# Patient Record
Sex: MALE | Race: BLACK OR AFRICAN AMERICAN | NOT HISPANIC OR LATINO | Employment: FULL TIME | URBAN - METROPOLITAN AREA
[De-identification: names, ages, dates, MRNs, and addresses within clinical notes are randomized per-mention and may not be internally consistent; named-entity substitution may affect disease eponyms.]

---

## 2020-07-10 ENCOUNTER — HOSPITAL ENCOUNTER (OUTPATIENT)
Dept: GASTROENTEROLOGY | Facility: HOSPITAL | Age: 65
Discharge: HOME | End: 2020-07-10
Attending: NURSE PRACTITIONER

## 2020-07-10 ENCOUNTER — OFFICE VISIT CONVERTED (OUTPATIENT)
Dept: GASTROENTEROLOGY | Facility: HOSPITAL | Age: 65
End: 2020-07-10
Attending: NURSE PRACTITIONER

## 2020-07-10 LAB
ALBUMIN SERPL-MCNC: 4.4 G/DL (ref 3.5–5)
ALBUMIN/GLOB SERPL: 1.6 {RATIO} (ref 1.4–2.6)
ALP SERPL-CCNC: 79 U/L (ref 56–155)
ALT SERPL-CCNC: 58 U/L (ref 10–40)
ANION GAP SERPL CALC-SCNC: 16 MMOL/L (ref 8–19)
AST SERPL-CCNC: 54 U/L (ref 15–50)
BASOPHILS # BLD AUTO: 0.05 10*3/UL (ref 0–0.2)
BASOPHILS NFR BLD AUTO: 1 % (ref 0–3)
BILIRUB SERPL-MCNC: 0.37 MG/DL (ref 0.2–1.3)
BUN SERPL-MCNC: 13 MG/DL (ref 5–25)
BUN/CREAT SERPL: 11 {RATIO} (ref 6–20)
CALCIUM SERPL-MCNC: 9.7 MG/DL (ref 8.7–10.4)
CHLORIDE SERPL-SCNC: 102 MMOL/L (ref 99–111)
CONV ABS IMM GRAN: 0.01 10*3/UL (ref 0–0.2)
CONV CO2: 25 MMOL/L (ref 22–32)
CONV HIV-1/ HIV-2: NONREACTIVE
CONV IMMATURE GRAN: 0.2 % (ref 0–1.8)
CONV TOTAL PROTEIN: 7.2 G/DL (ref 6.3–8.2)
CREAT UR-MCNC: 1.23 MG/DL (ref 0.7–1.2)
DEPRECATED RDW RBC AUTO: 44.8 FL (ref 35.1–43.9)
EOSINOPHIL # BLD AUTO: 0.12 10*3/UL (ref 0–0.7)
EOSINOPHIL # BLD AUTO: 2.4 % (ref 0–7)
ERYTHROCYTE [DISTWIDTH] IN BLOOD BY AUTOMATED COUNT: 14.2 % (ref 11.6–14.4)
ETHANOL BLD-MCNC: <10 MG/DL
GFR SERPLBLD BASED ON 1.73 SQ M-ARVRAT: >60 ML/MIN/{1.73_M2}
GLOBULIN UR ELPH-MCNC: 2.8 G/DL (ref 2–3.5)
GLUCOSE SERPL-MCNC: 87 MG/DL (ref 70–99)
HCT VFR BLD AUTO: 44.2 % (ref 42–52)
HGB BLD-MCNC: 14.1 G/DL (ref 14–18)
INR PPP: 0.89 (ref 2–3)
LYMPHOCYTES # BLD AUTO: 1.54 10*3/UL (ref 1–5)
LYMPHOCYTES NFR BLD AUTO: 31 % (ref 20–45)
MCH RBC QN AUTO: 27.6 PG (ref 27–31)
MCHC RBC AUTO-ENTMCNC: 31.9 G/DL (ref 33–37)
MCV RBC AUTO: 86.5 FL (ref 80–96)
MONOCYTES # BLD AUTO: 0.58 10*3/UL (ref 0.2–1.2)
MONOCYTES NFR BLD AUTO: 11.7 % (ref 3–10)
NEUTROPHILS # BLD AUTO: 2.67 10*3/UL (ref 2–8)
NEUTROPHILS NFR BLD AUTO: 53.7 % (ref 30–85)
NRBC CBCN: 0 % (ref 0–0.7)
OSMOLALITY SERPL CALC.SUM OF ELEC: 285 MOSM/KG (ref 273–304)
PLATELET # BLD AUTO: 223 10*3/UL (ref 130–400)
PMV BLD AUTO: 11.8 FL (ref 9.4–12.4)
POTASSIUM SERPL-SCNC: 4.8 MMOL/L (ref 3.5–5.3)
PROTHROMBIN TIME: 9.8 S (ref 9.4–12)
RBC # BLD AUTO: 5.11 10*6/UL (ref 4.7–6.1)
SODIUM SERPL-SCNC: 138 MMOL/L (ref 135–147)
WBC # BLD AUTO: 4.97 10*3/UL (ref 4.8–10.8)

## 2020-07-11 LAB
CONV HEPATITIS B SURFACE AG W CONFIRMATION RE: NEGATIVE
HAV IGM SERPL QL IA: NEGATIVE
HBV CORE AB SER DONR QL IA: POSITIVE
HBV CORE IGM SERPL QL IA: NEGATIVE
HCV AB SER DONR QL: >11 S/CO RATIO (ref 0–0.9)

## 2020-07-14 LAB — CONV FIBROSURE HCV: NORMAL

## 2020-07-15 LAB
CONV HEPATITIS C TEST INFO: NORMAL
HCV RNA SERPL NAA+PROBE-ACNC: NORMAL IU/ML
HCV RNA SERPL NAA+PROBE-LOG IU: 6.67 LOG10 IU/ML

## 2020-07-16 ENCOUNTER — HOSPITAL ENCOUNTER (OUTPATIENT)
Dept: ULTRASOUND IMAGING | Facility: HOSPITAL | Age: 65
Discharge: HOME | End: 2020-07-16
Attending: NURSE PRACTITIONER

## 2020-07-16 LAB
CONV HEPATITIS C GENOTYPE NOTE: NORMAL
HCV GENTYP SERPL NAA+PROBE: NORMAL

## 2020-07-21 ENCOUNTER — HOSPITAL ENCOUNTER (OUTPATIENT)
Dept: MRI IMAGING | Facility: HOSPITAL | Age: 65
Discharge: HOME | End: 2020-07-21
Attending: PHYSICIAN ASSISTANT

## 2020-08-07 ENCOUNTER — HOSPITAL ENCOUNTER (OUTPATIENT)
Dept: OTHER | Facility: HOSPITAL | Age: 65
Discharge: HOME | End: 2020-08-07
Attending: NURSE PRACTITIONER

## 2020-08-09 LAB
CONV HBV TEST INFORMATION: NORMAL
CONV HEPATITIS B VIRUS DNA (LOG UNITS PER VOLUME) (VIRAL LOAD): NORMAL LOG10 IU/ML
HEP B VIRAL DNA IU/ML: NORMAL IU/ML

## 2020-08-14 ENCOUNTER — HOSPITAL ENCOUNTER (OUTPATIENT)
Dept: GENERAL RADIOLOGY | Facility: HOSPITAL | Age: 65
Discharge: HOME | End: 2020-08-14
Attending: NURSE PRACTITIONER

## 2020-09-01 ENCOUNTER — CONVERSION ENCOUNTER (OUTPATIENT)
Dept: NEUROLOGY | Facility: CLINIC | Age: 65
End: 2020-09-01

## 2020-09-01 ENCOUNTER — OFFICE VISIT CONVERTED (OUTPATIENT)
Dept: NEUROLOGY | Facility: CLINIC | Age: 65
End: 2020-09-01
Attending: NURSE PRACTITIONER

## 2020-09-09 ENCOUNTER — HOSPITAL ENCOUNTER (OUTPATIENT)
Dept: OTHER | Facility: HOSPITAL | Age: 65
Discharge: HOME | End: 2020-09-09
Attending: NURSE PRACTITIONER

## 2020-09-09 LAB
CRP SERPL-MCNC: 1.3 MG/L (ref 0–5)
ERYTHROCYTE [SEDIMENTATION RATE] IN BLOOD: 2 MM/H (ref 0–20)

## 2020-09-15 ENCOUNTER — HOSPITAL ENCOUNTER (OUTPATIENT)
Dept: OTHER | Facility: HOSPITAL | Age: 65
Discharge: HOME | End: 2020-09-15
Attending: NURSE PRACTITIONER

## 2020-09-15 LAB
ALBUMIN SERPL-MCNC: 4.3 G/DL (ref 3.5–5)
ALBUMIN/GLOB SERPL: 1.6 {RATIO} (ref 1.4–2.6)
ALP SERPL-CCNC: 66 U/L (ref 56–155)
ALT SERPL-CCNC: 20 U/L (ref 10–40)
ANION GAP SERPL CALC-SCNC: 13 MMOL/L (ref 8–19)
AST SERPL-CCNC: 21 U/L (ref 15–50)
BASOPHILS # BLD AUTO: 0.03 10*3/UL (ref 0–0.2)
BASOPHILS NFR BLD AUTO: 0.3 % (ref 0–3)
BILIRUB SERPL-MCNC: 0.28 MG/DL (ref 0.2–1.3)
BUN SERPL-MCNC: 22 MG/DL (ref 5–25)
BUN/CREAT SERPL: 18 {RATIO} (ref 6–20)
CALCIUM SERPL-MCNC: 9.2 MG/DL (ref 8.7–10.4)
CHLORIDE SERPL-SCNC: 101 MMOL/L (ref 99–111)
CONV ABS IMM GRAN: 0.06 10*3/UL (ref 0–0.2)
CONV CO2: 28 MMOL/L (ref 22–32)
CONV IMMATURE GRAN: 0.6 % (ref 0–1.8)
CONV TOTAL PROTEIN: 7 G/DL (ref 6.3–8.2)
CREAT UR-MCNC: 1.2 MG/DL (ref 0.7–1.2)
DEPRECATED RDW RBC AUTO: 41.1 FL (ref 35.1–43.9)
EOSINOPHIL # BLD AUTO: 0.01 10*3/UL (ref 0–0.7)
EOSINOPHIL # BLD AUTO: 0.1 % (ref 0–7)
ERYTHROCYTE [DISTWIDTH] IN BLOOD BY AUTOMATED COUNT: 13.3 % (ref 11.6–14.4)
GFR SERPLBLD BASED ON 1.73 SQ M-ARVRAT: >60 ML/MIN/{1.73_M2}
GLOBULIN UR ELPH-MCNC: 2.7 G/DL (ref 2–3.5)
GLUCOSE SERPL-MCNC: 114 MG/DL (ref 70–99)
HCT VFR BLD AUTO: 45.8 % (ref 42–52)
HGB BLD-MCNC: 14.8 G/DL (ref 14–18)
INR PPP: 0.99 (ref 2–3)
LYMPHOCYTES # BLD AUTO: 2.27 10*3/UL (ref 1–5)
LYMPHOCYTES NFR BLD AUTO: 21.4 % (ref 20–45)
MCH RBC QN AUTO: 27.6 PG (ref 27–31)
MCHC RBC AUTO-ENTMCNC: 32.3 G/DL (ref 33–37)
MCV RBC AUTO: 85.4 FL (ref 80–96)
MONOCYTES # BLD AUTO: 1.24 10*3/UL (ref 0.2–1.2)
MONOCYTES NFR BLD AUTO: 11.7 % (ref 3–10)
NEUTROPHILS # BLD AUTO: 7 10*3/UL (ref 2–8)
NEUTROPHILS NFR BLD AUTO: 65.9 % (ref 30–85)
NRBC CBCN: 0 % (ref 0–0.7)
OSMOLALITY SERPL CALC.SUM OF ELEC: 288 MOSM/KG (ref 273–304)
PLATELET # BLD AUTO: 230 10*3/UL (ref 130–400)
PMV BLD AUTO: 10.3 FL (ref 9.4–12.4)
POTASSIUM SERPL-SCNC: 4.6 MMOL/L (ref 3.5–5.3)
PROTHROMBIN TIME: 10.7 S (ref 9.4–12)
RBC # BLD AUTO: 5.36 10*6/UL (ref 4.7–6.1)
SODIUM SERPL-SCNC: 137 MMOL/L (ref 135–147)
WBC # BLD AUTO: 10.61 10*3/UL (ref 4.8–10.8)

## 2020-09-17 LAB
CONV HEPATITIS C TEST INFO: NORMAL
HCV RNA SERPL NAA+PROBE-ACNC: 50 IU/ML
HCV RNA SERPL NAA+PROBE-LOG IU: 1.7 LOG10 IU/ML

## 2020-09-25 ENCOUNTER — HOSPITAL ENCOUNTER (OUTPATIENT)
Dept: GASTROENTEROLOGY | Facility: HOSPITAL | Age: 65
Discharge: HOME | End: 2020-09-25
Attending: NURSE PRACTITIONER

## 2020-09-25 ENCOUNTER — OFFICE VISIT CONVERTED (OUTPATIENT)
Dept: GASTROENTEROLOGY | Facility: HOSPITAL | Age: 65
End: 2020-09-25
Attending: NURSE PRACTITIONER

## 2020-09-25 LAB
ALBUMIN SERPL-MCNC: 3.9 G/DL (ref 3.5–5)
ALBUMIN/GLOB SERPL: 1.5 {RATIO} (ref 1.4–2.6)
ALP SERPL-CCNC: 57 U/L (ref 56–155)
ALT SERPL-CCNC: 13 U/L (ref 10–40)
ANION GAP SERPL CALC-SCNC: 12 MMOL/L (ref 8–19)
AST SERPL-CCNC: 19 U/L (ref 15–50)
BASOPHILS # BLD AUTO: 0.04 10*3/UL (ref 0–0.2)
BASOPHILS NFR BLD AUTO: 0.7 % (ref 0–3)
BILIRUB SERPL-MCNC: <0.15 MG/DL (ref 0.2–1.3)
BUN SERPL-MCNC: 14 MG/DL (ref 5–25)
BUN/CREAT SERPL: 12 {RATIO} (ref 6–20)
CALCIUM SERPL-MCNC: 8.9 MG/DL (ref 8.7–10.4)
CHLORIDE SERPL-SCNC: 107 MMOL/L (ref 99–111)
CONV ABS IMM GRAN: 0.02 10*3/UL (ref 0–0.2)
CONV CO2: 26 MMOL/L (ref 22–32)
CONV IMMATURE GRAN: 0.4 % (ref 0–1.8)
CONV TOTAL PROTEIN: 6.5 G/DL (ref 6.3–8.2)
CREAT UR-MCNC: 1.18 MG/DL (ref 0.7–1.2)
DEPRECATED RDW RBC AUTO: 44.9 FL (ref 35.1–43.9)
EOSINOPHIL # BLD AUTO: 0.15 10*3/UL (ref 0–0.7)
EOSINOPHIL # BLD AUTO: 2.7 % (ref 0–7)
ERYTHROCYTE [DISTWIDTH] IN BLOOD BY AUTOMATED COUNT: 13.9 % (ref 11.6–14.4)
GFR SERPLBLD BASED ON 1.73 SQ M-ARVRAT: >60 ML/MIN/{1.73_M2}
GLOBULIN UR ELPH-MCNC: 2.6 G/DL (ref 2–3.5)
GLUCOSE SERPL-MCNC: 118 MG/DL (ref 70–99)
HCT VFR BLD AUTO: 42.6 % (ref 42–52)
HGB BLD-MCNC: 13.5 G/DL (ref 14–18)
INR PPP: 0.9 (ref 2–3)
LYMPHOCYTES # BLD AUTO: 1.93 10*3/UL (ref 1–5)
LYMPHOCYTES NFR BLD AUTO: 34.6 % (ref 20–45)
MCH RBC QN AUTO: 27.6 PG (ref 27–31)
MCHC RBC AUTO-ENTMCNC: 31.7 G/DL (ref 33–37)
MCV RBC AUTO: 87.1 FL (ref 80–96)
MONOCYTES # BLD AUTO: 0.69 10*3/UL (ref 0.2–1.2)
MONOCYTES NFR BLD AUTO: 12.4 % (ref 3–10)
NEUTROPHILS # BLD AUTO: 2.75 10*3/UL (ref 2–8)
NEUTROPHILS NFR BLD AUTO: 49.2 % (ref 30–85)
NRBC CBCN: 0 % (ref 0–0.7)
OSMOLALITY SERPL CALC.SUM OF ELEC: 292 MOSM/KG (ref 273–304)
PLATELET # BLD AUTO: 174 10*3/UL (ref 130–400)
PMV BLD AUTO: 10.9 FL (ref 9.4–12.4)
POTASSIUM SERPL-SCNC: 4.5 MMOL/L (ref 3.5–5.3)
PROTHROMBIN TIME: 9.9 S (ref 9.4–12)
RBC # BLD AUTO: 4.89 10*6/UL (ref 4.7–6.1)
SODIUM SERPL-SCNC: 140 MMOL/L (ref 135–147)
WBC # BLD AUTO: 5.58 10*3/UL (ref 4.8–10.8)

## 2020-09-27 LAB
CONV HEPATITIS C TEST INFO: NORMAL
HCV RNA SERPL NAA+PROBE-ACNC: <15 IU/ML

## 2020-10-23 ENCOUNTER — OFFICE VISIT CONVERTED (OUTPATIENT)
Dept: GASTROENTEROLOGY | Facility: HOSPITAL | Age: 65
End: 2020-10-23
Attending: NURSE PRACTITIONER

## 2020-10-23 ENCOUNTER — HOSPITAL ENCOUNTER (OUTPATIENT)
Dept: GASTROENTEROLOGY | Facility: HOSPITAL | Age: 65
Discharge: HOME | End: 2020-10-23
Attending: NURSE PRACTITIONER

## 2020-10-23 LAB
ALBUMIN SERPL-MCNC: 3.9 G/DL (ref 3.5–5)
ALBUMIN/GLOB SERPL: 1.3 {RATIO} (ref 1.4–2.6)
ALP SERPL-CCNC: 84 U/L (ref 56–155)
ALT SERPL-CCNC: 13 U/L (ref 10–40)
ANION GAP SERPL CALC-SCNC: 15 MMOL/L (ref 8–19)
AST SERPL-CCNC: 21 U/L (ref 15–50)
BASOPHILS # BLD AUTO: 0.04 10*3/UL (ref 0–0.2)
BASOPHILS NFR BLD AUTO: 0.7 % (ref 0–3)
BILIRUB SERPL-MCNC: 0.22 MG/DL (ref 0.2–1.3)
BUN SERPL-MCNC: 13 MG/DL (ref 5–25)
BUN/CREAT SERPL: 12 {RATIO} (ref 6–20)
CALCIUM SERPL-MCNC: 8.9 MG/DL (ref 8.7–10.4)
CHLORIDE SERPL-SCNC: 105 MMOL/L (ref 99–111)
CONV ABS IMM GRAN: 0.02 10*3/UL (ref 0–0.2)
CONV CO2: 23 MMOL/L (ref 22–32)
CONV IMMATURE GRAN: 0.3 % (ref 0–1.8)
CONV TOTAL PROTEIN: 6.8 G/DL (ref 6.3–8.2)
CREAT UR-MCNC: 1.06 MG/DL (ref 0.7–1.2)
DEPRECATED RDW RBC AUTO: 41.1 FL (ref 35.1–43.9)
EOSINOPHIL # BLD AUTO: 0.16 10*3/UL (ref 0–0.7)
EOSINOPHIL # BLD AUTO: 2.6 % (ref 0–7)
ERYTHROCYTE [DISTWIDTH] IN BLOOD BY AUTOMATED COUNT: 13.2 % (ref 11.6–14.4)
GFR SERPLBLD BASED ON 1.73 SQ M-ARVRAT: >60 ML/MIN/{1.73_M2}
GLOBULIN UR ELPH-MCNC: 2.9 G/DL (ref 2–3.5)
GLUCOSE SERPL-MCNC: 104 MG/DL (ref 70–99)
HCT VFR BLD AUTO: 42.2 % (ref 42–52)
HGB BLD-MCNC: 13.7 G/DL (ref 14–18)
INR PPP: 0.88 (ref 2–3)
LYMPHOCYTES # BLD AUTO: 2.08 10*3/UL (ref 1–5)
LYMPHOCYTES NFR BLD AUTO: 33.9 % (ref 20–45)
MCH RBC QN AUTO: 27.5 PG (ref 27–31)
MCHC RBC AUTO-ENTMCNC: 32.5 G/DL (ref 33–37)
MCV RBC AUTO: 84.7 FL (ref 80–96)
MONOCYTES # BLD AUTO: 0.73 10*3/UL (ref 0.2–1.2)
MONOCYTES NFR BLD AUTO: 11.9 % (ref 3–10)
NEUTROPHILS # BLD AUTO: 3.1 10*3/UL (ref 2–8)
NEUTROPHILS NFR BLD AUTO: 50.6 % (ref 30–85)
NRBC CBCN: 0 % (ref 0–0.7)
OSMOLALITY SERPL CALC.SUM OF ELEC: 286 MOSM/KG (ref 273–304)
PLATELET # BLD AUTO: 246 10*3/UL (ref 130–400)
PMV BLD AUTO: 10.7 FL (ref 9.4–12.4)
POTASSIUM SERPL-SCNC: 4.6 MMOL/L (ref 3.5–5.3)
PROTHROMBIN TIME: 9.7 S (ref 9.4–12)
RBC # BLD AUTO: 4.98 10*6/UL (ref 4.7–6.1)
SODIUM SERPL-SCNC: 138 MMOL/L (ref 135–147)
WBC # BLD AUTO: 6.13 10*3/UL (ref 4.8–10.8)

## 2020-10-24 LAB
CONV HEPATITIS C TEST INFO: NORMAL
HCV RNA SERPL NAA+PROBE-ACNC: NORMAL IU/ML

## 2020-10-29 ENCOUNTER — CONVERSION ENCOUNTER (OUTPATIENT)
Dept: OTOLARYNGOLOGY | Facility: CLINIC | Age: 65
End: 2020-10-29

## 2020-10-29 ENCOUNTER — OFFICE VISIT CONVERTED (OUTPATIENT)
Dept: OTOLARYNGOLOGY | Facility: CLINIC | Age: 65
End: 2020-10-29
Attending: OTOLARYNGOLOGY

## 2020-11-11 ENCOUNTER — OFFICE VISIT CONVERTED (OUTPATIENT)
Dept: NEUROLOGY | Facility: CLINIC | Age: 65
End: 2020-11-11
Attending: NURSE PRACTITIONER

## 2020-11-20 ENCOUNTER — HOSPITAL ENCOUNTER (OUTPATIENT)
Dept: GASTROENTEROLOGY | Facility: HOSPITAL | Age: 65
Discharge: HOME | End: 2020-11-20
Attending: NURSE PRACTITIONER

## 2020-11-20 ENCOUNTER — OFFICE VISIT CONVERTED (OUTPATIENT)
Dept: GASTROENTEROLOGY | Facility: HOSPITAL | Age: 65
End: 2020-11-20
Attending: NURSE PRACTITIONER

## 2020-11-20 LAB
ALBUMIN SERPL-MCNC: 4 G/DL (ref 3.5–5)
ALBUMIN/GLOB SERPL: 1.4 {RATIO} (ref 1.4–2.6)
ALP SERPL-CCNC: 77 U/L (ref 56–155)
ALT SERPL-CCNC: 13 U/L (ref 10–40)
ANION GAP SERPL CALC-SCNC: 11 MMOL/L (ref 8–19)
AST SERPL-CCNC: 21 U/L (ref 15–50)
BASOPHILS # BLD AUTO: 0.03 10*3/UL (ref 0–0.2)
BASOPHILS NFR BLD AUTO: 0.6 % (ref 0–3)
BILIRUB SERPL-MCNC: 0.33 MG/DL (ref 0.2–1.3)
BUN SERPL-MCNC: 14 MG/DL (ref 5–25)
BUN/CREAT SERPL: 13 {RATIO} (ref 6–20)
CALCIUM SERPL-MCNC: 9.8 MG/DL (ref 8.7–10.4)
CHLORIDE SERPL-SCNC: 106 MMOL/L (ref 99–111)
CONV ABS IMM GRAN: 0.01 10*3/UL (ref 0–0.2)
CONV CO2: 26 MMOL/L (ref 22–32)
CONV IMMATURE GRAN: 0.2 % (ref 0–1.8)
CONV TOTAL PROTEIN: 6.9 G/DL (ref 6.3–8.2)
CREAT UR-MCNC: 1.09 MG/DL (ref 0.7–1.2)
DEPRECATED RDW RBC AUTO: 41.8 FL (ref 35.1–43.9)
EOSINOPHIL # BLD AUTO: 0.16 10*3/UL (ref 0–0.7)
EOSINOPHIL # BLD AUTO: 3 % (ref 0–7)
ERYTHROCYTE [DISTWIDTH] IN BLOOD BY AUTOMATED COUNT: 13.5 % (ref 11.6–14.4)
GFR SERPLBLD BASED ON 1.73 SQ M-ARVRAT: >60 ML/MIN/{1.73_M2}
GLOBULIN UR ELPH-MCNC: 2.9 G/DL (ref 2–3.5)
GLUCOSE SERPL-MCNC: 89 MG/DL (ref 70–99)
HCT VFR BLD AUTO: 42.2 % (ref 42–52)
HGB BLD-MCNC: 13.7 G/DL (ref 14–18)
INR PPP: 0.94 (ref 2–3)
LYMPHOCYTES # BLD AUTO: 1.94 10*3/UL (ref 1–5)
LYMPHOCYTES NFR BLD AUTO: 36.7 % (ref 20–45)
MCH RBC QN AUTO: 27.6 PG (ref 27–31)
MCHC RBC AUTO-ENTMCNC: 32.5 G/DL (ref 33–37)
MCV RBC AUTO: 84.9 FL (ref 80–96)
MONOCYTES # BLD AUTO: 0.77 10*3/UL (ref 0.2–1.2)
MONOCYTES NFR BLD AUTO: 14.6 % (ref 3–10)
NEUTROPHILS # BLD AUTO: 2.37 10*3/UL (ref 2–8)
NEUTROPHILS NFR BLD AUTO: 44.9 % (ref 30–85)
NRBC CBCN: 0 % (ref 0–0.7)
OSMOLALITY SERPL CALC.SUM OF ELEC: 288 MOSM/KG (ref 273–304)
PLATELET # BLD AUTO: 179 10*3/UL (ref 130–400)
PMV BLD AUTO: 10.4 FL (ref 9.4–12.4)
POTASSIUM SERPL-SCNC: 4.3 MMOL/L (ref 3.5–5.3)
PROTHROMBIN TIME: 10.3 S (ref 9.4–12)
RBC # BLD AUTO: 4.97 10*6/UL (ref 4.7–6.1)
SODIUM SERPL-SCNC: 139 MMOL/L (ref 135–147)
WBC # BLD AUTO: 5.28 10*3/UL (ref 4.8–10.8)

## 2020-11-22 LAB
CONV HEPATITIS C TEST INFO: NORMAL
HCV RNA SERPL NAA+PROBE-ACNC: NORMAL IU/ML

## 2021-05-10 NOTE — H&P
History and Physical      Patient Name: Josue Stern   Patient ID: [id number]   Sex: Male   YOB: 1955    Primary Care Provider: Forrest Duong MD   Referring Provider: Cora RICO    Visit Date: October 29, 2020    Provider: Rakesh Camilo MD   Location: [facility]   Location Address: [address] [place] [address], [address]  [id number]   Location Phone: [phone]          Chief Complaint     1.  Nasal lesion    2.  Folliculitis       History Of Present Illness  Josue Stern is a 65 year old male who presents to the office today as a consult from Cora RICO.      He presents the clinic today for evaluation of a nasal lesion that is been present for several weeks.  Patient notes significant swelling of the nasal tip followed by ventral drainage intranasally.  He noted that there was foul-smelling purulence emanating from the area.  He has not had any issues with this part of his nose before, and he states that the swelling has been subsiding significantly since the area started to drain.  He does have some discomfort, but in general notes that it is improving.  He denies any other ear nose and throat issues.  He notes that he breathes well through his nose in general.       Past Medical History  Arthritis; Hepatitis C; High blood pressure; Nasal lesion; Neuropathy         Past Surgical History  Hernia         Medication List  buspirone 15 mg oral tablet; Colace 100 mg oral capsule; cyclobenzaprine 10 mg oral tablet; gabapentin 800 mg oral tablet; Inderal LA 60 mg oral capsule,extended release 24 hr; Minipress 1 mg oral capsule; Norvasc 5 mg oral tablet; Suboxone 8-2 mg sublingual film; trazodone 150 mg oral tablet; Tylenol 325 mg oral capsule; Zoloft 100 mg oral tablet; Zyrtec 10 mg oral tablet         Allergy List  aspirin         Family Medical History  *No Known Family History         Social History  ; Tobacco (Current every day)  "        Review of Systems  · Constitutional  o Denies  o : fever, night sweats, weight loss  · Eyes  o Denies  o : discharge from eye, impaired vision  · HENT  o Admits  o : *See HPI  · Cardiovascular  o Denies  o : chest pain, irregular heart beats  · Respiratory  o Admits  o : wheezing  o Denies  o : shortness of breath, coughing up blood  · Gastrointestinal  o Denies  o : heartburn, reflux, vomiting blood  · Genitourinary  o Denies  o : frequency  · Integument  o Denies  o : rash, skin dryness  · Neurologic  o Denies  o : seizures, loss of balance, loss of consciousness, dizziness  · Endocrine  o Denies  o : cold intolerance, heat intolerance  · Heme-Lymph  o Denies  o : easy bleeding, anemia      Vitals  Date Time BP Position Site L\R Cuff Size HR RR TEMP (F) WT  HT  BMI kg/m2 BSA m2 O2 Sat FR L/min FiO2 HC       10/29/2020 02:11 PM        97.2 190lbs 0oz 5'  7\" 29.76 2.02             Physical Examination  · Constitutional  o Appearance  o : well developed, well-nourished, alert and in no acute distress, voice clear and strong  · Head and Face  o Head  o :   § Inspection  § : no deformities or lesions  o Face  o :   § Inspection  § : No facial lesions; House-Brackmann I/VI bilaterally  § Palpation  § : No TMJ crepitus nor  muscle tenderness bilaterally  · Eyes  o Vision  o :   § Visual Fields  § : Extraocular movements are intact. No spontaneous or gaze-induced nystagmus.  o Conjunctivae  o : clear  o Sclerae  o : clear  o Pupils and Irises  o : pupils equal, round, and reactive to light.   · Ears, Nose, Mouth and Throat  o Ears  o :   § External Ears  § : appearance within normal limits, no lesions present  § Otoscopic Examination  § : tympanic membrane appearance within normal limits bilaterally without perforations, well-aerated middle ears  § Hearing  § : intact to conversational voice both ears  o Nose  o :   § External Nose  § : appearance normal  § Intranasal Exam  § : mucosa with healing area " of folliculitis along the left nasal passage superiorly, resolving, vestibules normal, no intranasal lesions present, septum midline, sinuses non tender to percussion  o Oral Cavity  o :   § Oral Mucosa  § : oral mucosa normal without pallor or cyanosis  § Lips  § : lip appearance normal  § Teeth  § : normal dentition for age  § Gums  § : gums pink, non-swollen, no bleeding present  § Tongue  § : tongue appearance normal; normal mobility  § Palate  § : hard palate normal, soft palate appearance normal with symmetric mobility  o Throat  o :   § Oropharynx  § : no inflammation or lesions present, tonsils within normal limits  § Hypopharynx  § : appearance within normal limits, superior epiglottis within normal limits  § Larynx  § : appearance within normal limits, vocal cords within normal limits, no lesions present  · Neck  o Inspection/Palpation  o : normal appearance, no masses or tenderness, trachea midline; thyroid size normal, nontender, no nodules or masses present on palpation  · Respiratory  o Respiratory Effort  o : breathing unlabored  o Inspection of Chest  o : normal appearance, no retractions  · Cardiovascular  o Heart  o : regular rate and rhythm  · Lymphatic  o Neck  o : no lymphadenopathy present  o Supraclavicular Nodes  o : no lymphadenopathy present  o Preauricular Nodes  o : no lymphadenopathy present  · Skin and Subcutaneous Tissue  o General Inspection  o : Regarding face and neck - there are no rashes present, no lesions present, and no areas of discoloration  · Neurologic  o Cranial Nerves  o : cranial nerves II-XII are grossly intact bilaterally  o Gait and Station  o : normal gait, able to stand without diffculty  · Psychiatric  o Judgement and Insight  o : judgment and insight intact  o Mood and Affect  o : mood normal, affect appropriate          Assessment  · Nasal lesion     478.19/J34.89      Plan  · Medications  o Medications have been Reconciled  o Transition of Care or Provider  Policy  · Instructions  o He presents the clinic today for evaluation of a nasal lesion that is been present for several weeks. Patient notes significant swelling of the nasal tip followed by ventral drainage intranasally. He noted that there was foul-smelling purulence emanating from the area. He has not had any issues with this part of his nose before, and he states that the swelling has been subsiding significantly since the area started to drain. He does have some discomfort, but in general notes that it is improving. He denies any other ear nose and throat issues. He notes that he breathes well through his nose in general. On examination he has a resolving area of folliculitis along the superior aspect of the left nasal passage. At this point, this appears to be on its way to improving spontaneously, and I will have him contact me should there be any further issues.  o Electronically Identified Patient Education Materials Provided Electronically  · Correspondence  o ENT Letter to Referring MD (Cora RICO) - 11/04/2020            Electronically Signed by: Rakesh Camilo MD -Author on November 4, 2020 01:47:17 PM

## 2021-05-10 NOTE — H&P
History and Physical      Patient Name: Josue Stern   Patient ID: [id number]   Sex: Male   YOB: 1955        Visit Date: September 1, 2020    Provider: TRISHA Gomez   Location: [facility]   Location Address: [address] [place] [address]  [id number]   Location Phone: [phone]          Chief Complaint     tremor?       History Of Present Illness  Josue Stern is a 65 year old male who presents today to [facility] today referred from Johnie MARRERO. States he began having headache to left temple for past 6-7 months. Sometimes will have pain to the right side as well. Endorses photophobia. States the pain is sharp and lasts 1-2 minutes. Occurs multiple times per day. Shoots up from the occipital region. Will take ibuprofen twice daily for it. States he's been on suboxone for 6 months for opiate withdrawal.   Independently Reviewed: Labs, MRI brain , Carotid Doppler results, and Prior PCP records      MRI brain:   1. No restricted diffusion or evidence for acute ischemia.  2. Mild to moderate increased T2 signal in periventricular white matter likely small vessel   ischemic changes.  Other etiologies such as vasculitis, demyelinating disease, or infectious   process may be less likely.  Correlate with history and exam.  There is minimal increased T2 signal   in the porfirio probably small vessel ischemic changes.  3. Mild parenchymal volume loss.  4. Small right mastoid effusion.       Past Medical History  Arthritis; Hepatitis C; High blood pressure         Past Surgical History  *No Past Surgical History         Medication List  buspirone 15 mg oral tablet; Colace 100 mg oral capsule; cyclobenzaprine 10 mg oral tablet; gabapentin 800 mg oral tablet; Inderal LA 60 mg oral capsule,extended release 24 hr; Minipress 1 mg oral capsule; Norvasc 5 mg oral tablet; Suboxone 8-2 mg sublingual film; trazodone 150 mg oral tablet; Tylenol 325 mg oral  "capsule; Zoloft 100 mg oral tablet; Zyrtec 10 mg oral tablet         Allergy List  aspirin         Family Medical History  Stroke; Arthrtis         Social History  ; Tobacco (Current every day)         Review of Systems  · Constitutional  o Denies  o : chills, excessive sweating, fatigue, fever, sycope/passing out, weight gain, weight loss  · Eyes  o Admits  o : blurry vision  o Denies  o : changes in vision, double vision  · HENT  o Denies  o : loss of hearing, ringing in the ears, ear aches, sore throat, nasal congestion, sinus pain, nose bleeds, seasonal allergies  · Cardiovascular  o Denies  o : blood clots, swollen legs, anemia, easy burising or bleeding, transfusions  · Respiratory  o Denies  o : shortness of breath, dry cough, productive cough, pneumonia, COPD  · Gastrointestinal  o Denies  o : difficulty swallowing, reflux  · Genitourinary  o Denies  o : incontinence  · Neurologic  o Admits  o : headache, loss of balance  o Denies  o : seizure, stroke, tremor, falls, dizziness/vertigo, difficulty with sleep, numbness/tingling/paresthesia , difficulty with coordination, difficulty with dexterity, weakness  · Musculoskeletal  o Admits  o : neck stiffness/pain, weakness  o Denies  o : swollen lymph nodes, muscle aches, joint pain, spasms, sciatica, pain radiating in arm, pain radiating in leg, low back pain  · Endocrine  o Denies  o : diabetes, thyroid disorder  · Psychiatric  o Admits  o : anxiety, depression      Vitals  Date Time BP Position Site L\R Cuff Size HR RR TEMP (F) WT  HT  BMI kg/m2 BSA m2 O2 Sat        09/01/2020 01:18 /68 Sitting    52 - R  98 184lbs 8oz 5'  7\" 28.9 1.99           Physical Examination  · Constitutional  o Appearance  o : well-nourished, well groomed, in no apparent distress  · Eyes  o Pupils and Irises  o : Pupils equal, round, and reactive to light and accommodation bilaterally  · Respiratory  o Auscultation of Lungs  o : Lungs were clear to ascultation " bilaterally. No wheezes, rhonchi or rales were appreciated.  · Cardiovascular  o Heart  o :   § Auscultation of Heart  § : Regular rate and rhythm, no murmurs, gallops or rubs were appreciated.  o Peripheral Vascular System  o :   § Extremities  § : No peripheral edema was appreciated  · Musculoskeletal  o General  o : Normal bulk and normal tone.  · Neurologic  o Mental Status Examination  o :   § Orientation  § : Alert and oriented to person, place, and time,  § Speech/Language  § : Intact naming, comprehension, and repetition. No dysarthria.  § Memory  § : Immediate recall is 3/3. Recall at 5 minutes is 3/3.   § Attention  § : Attention span is intact to serial 7 examination and finger tapping.   § Fund of Knowledge  § : Adequate fund of knowledge  o Cranial Nerves  o : Pupils are equal, round and reactive to light. Extraocular movements are intact. Visual fields are full. Fundoscopic examination reveals sharp disc bilaterally. Sensation in the V1-V3 distribution is intact and symmetric. Muscles of mastication are strong and symmetric. Muscles of facial expression are strong and symmetric. Hearing is intact. Palatal raise is intact and symmetric. Uvula is midline. Shoulder shrug is strong. Tongue protrudes in the midline.  o Motor Examination  o :   § RUE Strength  § : strength normal  § LUE Strength  § : strength normal  § RLE Strength  § : strength normal  § LLE Strength  § : strength normal  o Reflexes  o : 2+ reflexes throughout and symmetric. Negative Meneses. Negative Babinski.   o Sensation  o : Intact sensation to light touch, pinprick, vibration and proprioception throughout.  o Gait and Station  o :   § Gait Screening  § : Normal, narrow based gait, with a normal arm swing.  o Coordination  o : Intact finger to nose and heel to shin. Rapid alternating movements are intact in the upper and lower extremities.     Tenderness to left temporal region with palpation.           Assessment  · New onset  headache     784.0/R51  Will order CTA for further evaluation of new onset headache to rule out vascular abnormality. Will order labs to rule out temporal arteritis. Will send to optometry for eye exam, he is struggling to fill out his paper work due to visual deficits and states he has not had eye exam in many years.   · Sharp headache     784.0/R51    Problems Reconciled  Plan  · Orders  o CTA Head with IV Contrast [facility]; no Oral Prep (97467) - 784.0/R51 - 09/01/2020  o CRP (Inflammation) [facility] (91759) - 784.0/R51 - 09/01/2020  o Sed rate (01576) - 784.0/R51 - 09/01/2020  o OPTOMETRY CONSULTATION (OPTOM) - 784.0/R51 - 09/01/2020  o ACO-17: Screened for tobacco use AND received tobacco cessation intervention (4004F) - - 09/02/2020  · Medications  o Medications have been Reconciled  o Transition of Care or Provider Policy  · Instructions  o Encouraged to follow-up with Primary Care Provider for preventative care.  o Call or Return if symptoms worsen or persist.   o Follow Up in 6 weeks.   · Disposition  o Call or Return if symptoms worsen or persist.            Electronically Signed by: TRISHA Gomez -Author on September 2, 2020 09:17:21 AM

## 2021-05-13 NOTE — PROGRESS NOTES
Progress Note      Patient Name: Josue Stern   Patient ID: [id number]   Sex: Male   YOB: 1955    Primary Care Provider: Forrest Duong MD   Referring Provider: Cora RICO    Visit Date: November 11, 2020    Provider: TRISHA Gomez   Location: [facility]   Location Address: [address] [place] [address]  [id number]   Location Phone: [phone]          Chief Complaint  · Follow Up Exam      History Of Present Illness  Josue Stern is a 65 year old male who presents today to [facility] today referred from Johnie MARRERO. States he began having headache to left temple for past 6-7 months. Sometimes will have pain to the right side as well. Endorses photophobia. States the pain is sharp and lasts 1-2 minutes. Occurs multiple times per day. Shoots up from the occipital region. Will take ibuprofen twice daily for it. States he's been on suboxone for 6 months for opiate withdrawal.   Independently Reviewed: Labs, MRI brain , Carotid Doppler results, and Prior PCP records   Josue Stern is a 65 year old male who presents today to [facility] today for a follow up exam. States that he had a folliculitis in nasal passage that he saw ENT for. After resolution of this headaches resolved. Did not get CTA as scheduled. Is doing well.      MRI brain:   1. No restricted diffusion or evidence for acute ischemia.  2. Mild to moderate increased T2 signal in periventricular white matter likely small vessel   ischemic changes.  Other etiologies such as vasculitis, demyelinating disease, or infectious   process may be less likely.  Correlate with history and exam.  There is minimal increased T2 signal   in the porfirio probably small vessel ischemic changes.  3. Mild parenchymal volume loss.  4. Small right mastoid effusion.       Past Medical History  Arthritis; Hepatitis C; High blood pressure; Nasal lesion; Neuropathy         Past Surgical  History  Hernia         Medication List  buspirone 15 mg oral tablet; Colace 100 mg oral capsule; cyclobenzaprine 10 mg oral tablet; gabapentin 800 mg oral tablet; Inderal LA 60 mg oral capsule,extended release 24 hr; Minipress 1 mg oral capsule; Norvasc 5 mg oral tablet; Suboxone 8-2 mg sublingual film; trazodone 150 mg oral tablet; Tylenol 325 mg oral capsule; Zoloft 100 mg oral tablet; Zyrtec 10 mg oral tablet         Allergy List  aspirin       Allergies Reconciled  Family Medical History  *No Known Family History         Social History  ; Tobacco (Current every day)         Review of Systems  · Constitutional  o Denies  o : chills, excessive sweating, fatigue, fever, sycope/passing out, weight gain, weight loss  · Eyes  o Admits  o : blurry vision  o Denies  o : changes in vision, double vision  · HENT  o Denies  o : loss of hearing, ringing in the ears, ear aches, sore throat, nasal congestion, sinus pain, nose bleeds, seasonal allergies  · Cardiovascular  o Denies  o : blood clots, swollen legs, anemia, easy burising or bleeding, transfusions  · Respiratory  o Denies  o : shortness of breath, dry cough, productive cough, pneumonia, COPD  · Gastrointestinal  o Denies  o : difficulty swallowing, reflux  · Genitourinary  o Denies  o : incontinence  · Neurologic  o Admits  o : headache, loss of balance  o Denies  o : seizure, stroke, tremor, falls, dizziness/vertigo, difficulty with sleep, numbness/tingling/paresthesia , difficulty with coordination, difficulty with dexterity, weakness  · Musculoskeletal  o Admits  o : neck stiffness/pain, weakness  o Denies  o : swollen lymph nodes, muscle aches, joint pain, spasms, sciatica, pain radiating in arm, pain radiating in leg, low back pain  · Endocrine  o Denies  o : diabetes, thyroid disorder  · Psychiatric  o Admits  o : anxiety, depression      Vitals  Date Time BP Position Site L\R Cuff Size HR RR TEMP (F) WT  HT  BMI kg/m2 BSA m2 O2 Sat FR L/min FiO2 HC   "     11/11/2020 02:11 /78 Sitting    92 - R  96.7 193lbs 0oz 5'  7\" 30.23 2.03             Physical Examination  · Constitutional  o Appearance  o : well-nourished, well groomed, in no apparent distress  · Eyes  o Pupils and Irises  o : Pupils equal, round, and reactive to light and accommodation bilaterally  · Respiratory  o Auscultation of Lungs  o : Lungs were clear to ascultation bilaterally. No wheezes, rhonchi or rales were appreciated.  · Cardiovascular  o Heart  o :   § Auscultation of Heart  § : Regular rate and rhythm, no murmurs, gallops or rubs were appreciated.  o Peripheral Vascular System  o :   § Extremities  § : No peripheral edema was appreciated  · Musculoskeletal  o General  o : Normal bulk and normal tone.  · Neurologic  o Mental Status Examination  o :   § Orientation  § : Alert and oriented to person, place, and time,  § Speech/Language  § : Intact naming, comprehension, and repetition. No dysarthria.  § Memory  § : Immediate recall is 3/3. Recall at 5 minutes is 3/3.   § Attention  § : Attention span is intact to serial 7 examination and finger tapping.   § Fund of Knowledge  § : Adequate fund of knowledge  o Cranial Nerves  o : Pupils are equal, round and reactive to light. Extraocular movements are intact. Visual fields are full. Fundoscopic examination reveals sharp disc bilaterally. Sensation in the V1-V3 distribution is intact and symmetric. Muscles of mastication are strong and symmetric. Muscles of facial expression are strong and symmetric. Hearing is intact. Palatal raise is intact and symmetric. Uvula is midline. Shoulder shrug is strong. Tongue protrudes in the midline.  o Motor Examination  o :   § RUE Strength  § : strength normal  § LUE Strength  § : strength normal  § RLE Strength  § : strength normal  § LLE Strength  § : strength normal  o Reflexes  o : 2+ reflexes throughout and symmetric. Negative Meneses. Negative Babinski.   o Sensation  o : Intact sensation to light " touch, pinprick, vibration and proprioception throughout.  o Gait and Station  o :   § Gait Screening  § : Normal, narrow based gait, with a normal arm swing.  o Cerebellar Function  o : intact finger to nose and heel to shin. Rapid alternating movements are intact in the upper and lower extremities.   o Coordination  o : Intact finger to nose and heel to shin. Rapid alternating movements are intact in the upper and lower extremities.     Tenderness to left temporal region with palpation.           Assessment  · New onset headache     784.0/R51  Symptoms have resolved. He will f/u PRN      Plan  · Medications  o Medications have been Reconciled  o Transition of Care or Provider Policy  · Instructions  o Call or Return if symptoms worsen or persist.   o Follow up PRN.  · Disposition  o Call or Return if symptoms worsen or persist.            Electronically Signed by: TRISHA Gomez -Author on November 13, 2020 01:45:57 PM

## 2021-05-14 VITALS
WEIGHT: 193 LBS | SYSTOLIC BLOOD PRESSURE: 143 MMHG | TEMPERATURE: 96.7 F | HEART RATE: 92 BPM | DIASTOLIC BLOOD PRESSURE: 78 MMHG | HEIGHT: 67 IN | BODY MASS INDEX: 30.29 KG/M2

## 2021-05-14 VITALS — BODY MASS INDEX: 29.82 KG/M2 | TEMPERATURE: 97.2 F | WEIGHT: 190 LBS | HEIGHT: 67 IN

## 2021-05-14 VITALS
TEMPERATURE: 98 F | DIASTOLIC BLOOD PRESSURE: 68 MMHG | SYSTOLIC BLOOD PRESSURE: 108 MMHG | BODY MASS INDEX: 28.96 KG/M2 | WEIGHT: 184.5 LBS | HEIGHT: 67 IN | HEART RATE: 52 BPM

## 2021-05-28 VITALS
BODY MASS INDEX: 29.66 KG/M2 | DIASTOLIC BLOOD PRESSURE: 69 MMHG | HEIGHT: 67 IN | SYSTOLIC BLOOD PRESSURE: 116 MMHG | WEIGHT: 189 LBS | HEIGHT: 67 IN | WEIGHT: 189 LBS | SYSTOLIC BLOOD PRESSURE: 118 MMHG | BODY MASS INDEX: 37.11 KG/M2 | BODY MASS INDEX: 29.66 KG/M2 | DIASTOLIC BLOOD PRESSURE: 75 MMHG | SYSTOLIC BLOOD PRESSURE: 127 MMHG | DIASTOLIC BLOOD PRESSURE: 69 MMHG | WEIGHT: 189 LBS | HEIGHT: 60 IN

## 2021-05-28 VITALS
SYSTOLIC BLOOD PRESSURE: 109 MMHG | HEIGHT: 67 IN | WEIGHT: 189 LBS | DIASTOLIC BLOOD PRESSURE: 67 MMHG | BODY MASS INDEX: 29.66 KG/M2

## 2021-05-28 NOTE — PROGRESS NOTES
Patient: SPARKLE JACOBSON     Acct: [id number]     Report: #[id number]  UNIT #: [id number]     : 1955    Encounter Date:2020  PRIMARY CARE: [facility]  ***Signed***  --------------------------------------------------------------------------------------------------------------------  DATE: 20      [facility]      Chief Complaint      HEPATITIS C W/O HEPATIC COMA            Allergies      Coded Allergies:             ASPIRIN (Verified  Allergy, Unknown, 7/10/20)            Medications      Last Reconciled on 20 10:58 by LORY RICO      Sofosbuvir/Velpatasvir (Epclusa 400 mg-100 mg Tablet) 1 Each Tablet      1 TAB PO QDAY, #28 TAB 2 Refills         Prov: Lory Hurt cpx         20       traZODone HCl (traZODone HCl) 100 Mg Tablet      150 MG PO HS, #45 TAB 0 Refills         Reported         7/10/20       Sertraline HCl (Sertraline*) 20 Mg/1 Ml Oral.conc      100 MG PO QDAY, ML         Reported         7/10/20       Prazosin HCl (Prazosin HCl) 1 Mg Capsule      1 MG PO HS, #30 CAP 0 Refills         Reported         7/10/20       Gabapentin (Gabapentin) 250 Mg/5 Ml Solution      800 MG PO TID, #1440 ML 0 Refills         Reported         7/10/20       Cetirizine Hcl (CETIRIZINE HCL) 1 Mg/1 Ml Solution      10 MG PO QDAY, #300 ML         Reported         7/10/20       busPIRone HCl (busPIRone HCl) 5 Mg Tablet      10 MG PO TID, #180 TAB         Reported         7/10/20       Buprenorph-Nalox 8-2 Mg SL (Buprenorph-Nalox 8-2 Mg SL) 1 Each Tab.subl      2 TAB SL QDAY for 30 Days, #60 TAB.SL         Reported         7/10/20       amLODIPine (amLODIPine) 2.5 Mg Tablet      5 MG PO QDAY, #60 TAB 0 Refills         Reported         7/10/20       MDI-Albuterol (Proair HFA) 8.5 Gm Hfa.aer.ad      1 PUFFS INH RTBID, #1 MDI 0 Refills         Reported         7/10/20       Acetaminophen Es (ACETAMINOPHEN ES) 500 Mg Tablet      500 MG PO Q6H PRN for MILD PAIN  (1-3)/FEVER, #100 TAB         Reported         7/10/20            Vitals      Height 5 ft  / 152.4 cm      Weight 189 lbs  / 85.032593 kg      BSA 1.82 m2      BMI 36.9 kg/m2      Blood Pressure 116/69            Stroke - Family Hx:  Father      Social History:  Tobacco Use      Smoking status:  Current every day smoker      Substance use:  Denies use      Medical History:  Yes: Hx Hepatitis, Hx Hypertension            PREVENTION      Hx Influenza Vaccination:  No      Influenza Vaccine Declined:  Yes      2 or More Falls in Past Year?:  No      Fall Past Year with Injury?:  No            General:  No Fatigue, No Weight Loss      HEENT:  No Dysphagia, No Visual Changes      Respiratory:  No Cough, No Dyspnea      Cardiology:  No Chest Pain, No Palpitations      Gastrointestinal:  No Diarrhea, No Constipation      Genitourinary:  No Dysuria, No Frequency      Musculoskeletal:  No Joint Tenderness, No Joint Stiffness      Endocrine:  No Cold Intolerance, No Fatigue      Hematologic:  No Bleeding, No Bruising      Psychologic:  No Anxiety, No Depression      Neurologic:  No Confusion, No Weakness      Skin:  No Rash, No Open Wounds            Mr. Stern is a 65-year-old male who presents for follow-up of chronic hepatitis     C, genotype 1a without fibrosis.  He began treatment with Epclusa approximately     4 weeks ago.  He admits experiencing some worsening fatigue but is otherwise     doing well with treatment.            HEENT:  Atraumatic; No Scleral Icterus      Lungs:  CTAB, Breathing is unlabored      Abdomen:  Normal BS all 4 Quadrants, Soft      Cardiovascular:  Regular Rate and Rhythm; No Murmur      Constitutional:  Healthy appearing; No Acute Distress      Neurological:  Mental Status WNL, Alert+Ox3      Musculoskeletal:  Normal Bulk Strength, Normal Tone      Skin:  No Rash, No Swelling      Rectal:  Deferred            Lab Results      7/10/2020 CBC: Hemoglobin 14.1, hematocrit 44.2, platelets 223.       CMP: Creatinine 1.23, alk phos 79, AST 54, ALT 58, total bilirubin 0.37.      INR 0.89.      HCV quant 4,660,000, hepatitis A IgM antibody-negative, hepatitis B surface     antigen-negative, hepatitis B total core antibody-positive.      Hepatitis C genotype-1A.  HIV-nonreactive.  HCV sglidjcy-F5-E5, F3.      8/7/2020 hepatitis B DNA-not detected.      9/15/2020 CBC: Hemoglobin 14.8, hematocrit 45.8, platelets 230.      CMP: Creatinine 1.20, alk phos 66, AST 21, ALT 20, total bilirubin 0.28.      INR 0.99.  HCV quant-50.            Radiology Impressions      7/16/2020 right upper quadrant ultrasound-normal size and echotexture of the     liver.            Janay Stiffness Consistent with:  F0-F1      CAP Score:  Moderate/Severe Liver Fat            Current Plan      Continue treatment with Epclusa for a total of 12 weeks.      Chronic hepatitis C         Chronic hepatitis C without hepatic coma         Hepatic coma status: without hepatic coma            Notes      New Diagnostics      * CBC, Stat         Dx: Chronic hepatitis C - B18.2      * Comp Metabolic Panel, Stat         Dx: Chronic hepatitis C - B18.2      * HCV RNA QUANTITATIVE HCPCR, Stat         Dx: Chronic hepatitis C - B18.2      * PT / INR, Stat         Dx: Chronic hepatitis C - B18.2      Patient Education Provided:  Yes      Patient Instructions:  Avoid Alcohol, Avoid Illicit Drug Use, Importance of     keeping appointments      Disposition:  F/U 4 weeks            Electronically signed by Lory Hurt  09/25/2020 10:58       Disclaimer: Converted document may not contain table formatting or lab diagrams. Please see [employer] for the authenticated document.

## 2021-05-28 NOTE — PROGRESS NOTES
Patient: SPARKLE JACOBSON     Acct: [id number]     Report: #[id number]  UNIT #: [id number]     : 1955    Encounter Date:10/23/2020  PRIMARY CARE: [facility]  ***Signed***  --------------------------------------------------------------------------------------------------------------------  DATE: 10/23/20      [facility]      Chief Complaint      HEPATITIS C W/O HEPATIC COMA            Allergies      Coded Allergies:             ASPIRIN (Verified  Allergy, Unknown, 7/10/20)            Medications      Last Reconciled on 10/23/20 13:55 by LORY RICO      Sofosbuvir/Velpatasvir (Epclusa 400 mg-100 mg Tablet) 1 Each Tablet      1 TAB PO QDAY, #28 TAB 2 Refills         Prov: Lory Hurt cpx         20       traZODone HCl (traZODone HCl) 100 Mg Tablet      150 MG PO HS, #45 TAB 0 Refills         Reported         7/10/20       Sertraline HCl (Sertraline*) 20 Mg/1 Ml Oral.conc      100 MG PO QDAY, ML         Reported         7/10/20       Prazosin HCl (Prazosin HCl) 1 Mg Capsule      1 MG PO HS, #30 CAP 0 Refills         Reported         7/10/20       Gabapentin (Gabapentin) 250 Mg/5 Ml Solution      800 MG PO TID, #1440 ML 0 Refills         Reported         7/10/20       Cetirizine Hcl (CETIRIZINE HCL) 1 Mg/1 Ml Solution      10 MG PO QDAY, #300 ML         Reported         7/10/20       busPIRone HCl (busPIRone HCl) 5 Mg Tablet      10 MG PO TID, #180 TAB         Reported         7/10/20       Buprenorph-Nalox 8-2 Mg SL (Buprenorph-Nalox 8-2 Mg SL) 1 Each Tab.subl      2 TAB SL QDAY for 30 Days, #60 TAB.SL         Reported         7/10/20       amLODIPine (amLODIPine) 2.5 Mg Tablet      5 MG PO QDAY, #60 TAB 0 Refills         Reported         7/10/20       MDI-Albuterol (Proair HFA) 8.5 Gm Hfa.aer.ad      1 PUFFS INH RTBID, #1 MDI 0 Refills         Reported         7/10/20       Acetaminophen Es (ACETAMINOPHEN ES) 500 Mg Tablet      500 MG PO Q6H PRN for MILD PAIN  (1-3)/FEVER, #100 TAB         Reported         7/10/20            Vitals      Height 5 ft 7 in / 170.18 cm      Weight 188 lbs 15.970 oz / 85.729 kg      BSA 1.97 m2      BMI 29.6 kg/m2      Blood Pressure 118/69            Stroke - Family Hx:  Father      Social History:  Tobacco Use      Smoking status:  Current every day smoker      Substance use:  Denies use      Medical History:  Yes: Hx Hepatitis, Hx Hypertension            PREVENTION      Hx Influenza Vaccination:  No      Influenza Vaccine Declined:  Yes      2 or More Falls in Past Year?:  No      Fall Past Year with Injury?:  No      Hx Pneumococcal Vaccination:  No            General:  No Fatigue, No Weight Loss      HEENT:  No Dysphagia, No Visual Changes      Respiratory:  No Cough, No Dyspnea      Cardiology:  No Chest Pain, No Palpitations      Gastrointestinal:  No Diarrhea, No Constipation      Genitourinary:  No Dysuria, No Frequency      Musculoskeletal:  No Joint Tenderness, No Joint Stiffness      Endocrine:  No Cold Intolerance, No Fatigue      Hematologic:  No Bleeding, No Bruising      Psychologic:  No Anxiety, No Depression      Neurologic:  No Confusion, No Weakness      Skin:  No Rash, No Open Wounds            Mr. Stern is a 64 y/o male who presents for evaluation and treatment of chronic     HCV, GT 1a with F0 fibrosis.  He has completed 8 weeks of treatment with epclusa    and reports that he's doing well.  Denies any current side effects.            HEENT:  Atraumatic; No Scleral Icterus      Lungs:  CTAB, Breathing is unlabored      Abdomen:  Normal BS all 4 Quadrants, Soft      Cardiovascular:  Regular Rate and Rhythm; No Murmur      Constitutional:  Healthy appearing; No Acute Distress      Neurological:  Mental Status WNL, Alert+Ox3      Musculoskeletal:  Normal Bulk Strength, Normal Tone      Skin:  No Rash, No Swelling      Rectal:  Deferred            Lab Results      9/25/2020 CBC: Hemoglobin 13.5, hematocrit 42.6, platelets  174.      CMP: Creatinine 1.18, alk phos 57, AST 19, ALT 13, total bilirubin less than     0.15.      INR 0.9.      HCV quant-less than 15.            Janay Stiffness Consistent with:  F0-F1      CAP Score:  Moderate/Severe Liver Fat            Current Plan      Continue treatment with Epclusa for a total of 12 weeks.      Chronic hepatitis C         Chronic hepatitis C without hepatic coma         Hepatic coma status: without hepatic coma            Notes      New Diagnostics      * CBC, Stat         Dx: Chronic hepatitis C - B18.2      * Comp Metabolic Panel, Stat         Dx: Chronic hepatitis C - B18.2      * HCV RNA QUANTITATIVE HCPCR, Stat         Dx: Chronic hepatitis C - B18.2      * PT / INR, Stat         Dx: Chronic hepatitis C - B18.2      Patient Education Provided:  Yes      Patient Instructions:  Avoid Alcohol, Avoid Illicit Drug Use, Importance of     keeping appointments      Disposition:  F/U 4 weeks            Electronically signed by Lory Hurt  10/23/2020 13:55       Disclaimer: Converted document may not contain table formatting or lab diagrams. Please see [email or web address] for the authenticated document.

## 2021-05-28 NOTE — PROGRESS NOTES
Patient: SPARKLE JACOBSON     Acct: [id number]     Report: #[id number]  UNIT #: [id number]     : 1955    Encounter Date:07/10/2020  PRIMARY CARE: [facility]  ***Signed***  --------------------------------------------------------------------------------------------------------------------  DATE: 7/10/20      [facility]      Chief Complaint      HEPATITIS C W/O HEPATIC COMA            Allergies      Coded Allergies:             Aspirin (Verified  Allergy, 7/10/20)            Medications      Last Reconciled on 7/10/20 10:20 by CESAR RICO      traZODone HCl (traZODone HCl) 100 Mg Tablet      150 MG PO HS, #45 TAB 0 Refills         Reported         7/10/20       Sertraline HCl (Sertraline*) 20 Mg/1 Ml Oral.conc      100 MG PO QDAY, ML         Reported         7/10/20       Prazosin HCl (Prazosin HCl) 1 Mg Capsule      1 MG PO HS, #30 CAP 0 Refills         Reported         7/10/20       Gabapentin (Gabapentin) 250 Mg/5 Ml Solution      800 MG PO TID, #1440 ML 0 Refills         Reported         7/10/20       Cetirizine Hcl (CETIRIZINE HCL) 1 Mg/1 Ml Solution      10 MG PO QDAY, #300 ML         Reported         7/10/20       busPIRone HCl (busPIRone HCl) 5 Mg Tablet      10 MG PO TID, #180 TAB         Reported         7/10/20       Buprenorph-Nalox 8-2 Mg SL (Buprenorph-Nalox 8-2 Mg SL) 1 Each Tab.subl      2 TAB SL QDAY for 30 Days, #60 TAB.SL         Reported         7/10/20       amLODIPine (amLODIPine) 2.5 Mg Tablet      5 MG PO QDAY, #60 TAB 0 Refills         Reported         7/10/20       MDI-Albuterol (Proair HFA) 8.5 Gm Hfa.aer.ad      1 PUFFS INH RTBID, #1 MDI 0 Refills         Reported         7/10/20       Acetaminophen Es (ACETAMINOPHEN ES) 500 Mg Tablet      500 MG PO Q6H PRN for MILD PAIN (1-3)/FEVER, #100 TAB         Reported         7/10/20            Vitals      Height 5 ft 7 in / 170.18 cm      Weight 189 lbs  / 85.983658 kg      BSA 1.97 m2      BMI 29.6 kg/m2       Blood Pressure 109/67            Stroke - Family Hx:  Father      Social History:  Tobacco Use      Smoking status:  Current every day smoker      Smoking history:  10-25 pack years      Counseling given:  Patient declined      Substance use:  Denies use      Medical History:  Yes: Hx Hepatitis, Hx Hypertension            PREVENTION      Hx Influenza Vaccination:  No      Influenza Vaccine Declined:  Yes      2 or More Falls in Past Year?:  No      Fall Past Year with Injury?:  No      Hx Pneumococcal Vaccination:  No            General:  No Fatigue, No Weight Loss      HEENT:  No Dysphagia, No Visual Changes      Respiratory:  No Cough, No Dyspnea      Cardiology:  No Chest Pain, No Palpitations      Gastrointestinal:  No Diarrhea, No Constipation      Genitourinary:  No Dysuria, No Frequency      Musculoskeletal:  No Joint Tenderness, No Joint Stiffness      Endocrine:  No Cold Intolerance, No Fatigue      Hematologic:  No Bleeding, No Bruising      Psychologic:  No Anxiety, No Depression      Neurologic:  No Confusion, No Weakness      Skin:  No Rash, No Open Wounds            Mr. Stern is a very pleasant 64 y/o male with PMH of chronic HCV, HTN and     substance abuse.  He presents for evaluation of chronic HCV.  He reports that he    was previously treated by Dr. Edmonds approximately 15 years ago with injections,     but he's unsure which medication.  States that he was still drinking ETOH during    this time and treatment was not effective.  He admits a history of both illicit     drug abuse and ETOH abuse.  However, he currently resides at the [facility]     and has been clean and sober for the past 6 months.  He is maintained on     suboxone.  Denies any current symptoms.            HEENT:  Atraumatic; No Scleral Icterus      Lungs:  CTAB, Breathing is unlabored      Abdomen:  Normal BS all 4 Quadrants, Soft      Cardiovascular:  Regular Rate and Rhythm; No Murmur      Constitutional:  Healthy  appearing; No Acute Distress      Neurological:  Mental Status WNL, Alert+Ox3      Musculoskeletal:  Normal Bulk Strength, Normal Tone      Skin:  No Rash, No Swelling      Rectal:  Deferred            Lab Results      3/5/2018 acute hepatitis panel: Hepatitis B surface antigen-negative, hepatitis     B core IgM antibody-negative, hepatitis A IgM antibody-nonreactive, hepatitis C     antibody-reactive.  Hepatitis B surface antibody-negative            Janay Stiffness Consistent with:  F0-F1      CAP Score:  Moderate/Severe Liver Fat            Current Plan      Labs to determine genotype and treatment plan. Attempt to obtain records from     Dr. Edmonds's office to determine what medication he was previously treated with.      Chronic hepatitis C         Chronic hepatitis C without hepatic coma - B18.2         Hepatic coma status: without hepatic coma            Notes      New Medications      * ACETAMINOPHEN  MG TABLET: 500 MG PO Q6H PRN MILD PAIN (1-3)/FEVER #100      * MDI-Albuterol (Proair HFA) 8.5 GM HFA.AER.AD: 1 PUFFS INH RTBID #1      * amLODIPine 2.5 MG TABLET: 5 MG PO QDAY #60      * Buprenorph-Nalox 8-2 Mg SL 1 EACH TAB.SUBL: 2 TAB SL QDAY 30 Days #60      * busPIRone HCl 5 MG TABLET: 10 MG PO TID #180      * CETIRIZINE HCL 1 MG/1 ML SOLUTION: 10 MG PO QDAY #300      * Gabapentin 250 MG/5 ML SOLUTION: 800 MG PO TID #1440      * Prazosin HCl 1 MG CAPSULE: 1 MG PO HS #30      * Sertraline HCl (Sertraline*) 20 MG/1 ML ORAL.CONC: 100 MG PO QDAY      * traZODone HCl 100 MG TABLET: 150 MG PO HS #45      New Diagnostics      * HCV RNA QUANTITATIVE HCPCR, Stat         Dx: Chronic hepatitis C - B18.2      * HEPATITIS C GENOTYPE PCR HEPCT, Stat         Dx: Chronic hepatitis C - B18.2      * Hepatitis B Core Ant, Stat         Dx: Chronic hepatitis C - B18.2      * Alcohol Blood/Ethano, Stat         Dx: Chronic hepatitis C - B18.2      * Drug Screen Serum (9, Stat         Dx: Chronic hepatitis C - B18.2      * CBC,  Stat         Dx: Chronic hepatitis C - B18.2      * Comp Metabolic Panel, Stat         Dx: Chronic hepatitis C - B18.2      * Fibrosure Hcv, Stat         Dx: Chronic hepatitis C - B18.2      * Hiv 1 By Eia W/West , Stat         Dx: Chronic hepatitis C - B18.2      * Drug Screen Serum (9, Stat         Dx: Chronic hepatitis C - B18.2      * PT / INR, Stat         Dx: Chronic hepatitis C - B18.2      * US ABDOMEN LIMITED, SCHEDULED PROCEDURE         Dx: Chronic hepatitis C - B18.2      * Acute Hepatitis Pane, Stat         Dx: Chronic hepatitis C without hepatic coma - B18.2      Patient Education Provided:  Yes      Patient Instructions:  Avoid Alcohol, Avoid Illicit Drug Use, Importance of     keeping appointments      Disposition:  F/U 4 weeks            Electronically signed by Lory Hurt  07/10/2020 10:20       Disclaimer: Converted document may not contain table formatting or lab diagrams. Please see [employer] for the authenticated document.

## 2021-05-28 NOTE — PROGRESS NOTES
Patient: SPARKLE JACOBSON     Acct: [id number]     Report: #[id number]  UNIT #: [id number]     : 1955    Encounter Date:2020  PRIMARY CARE: [facility]  ***Signed***  --------------------------------------------------------------------------------------------------------------------  DATE: 20      [facility]      Chief Complaint      HEPATITIS C W/O HEPATIC COMA            Allergies      Coded Allergies:             ASPIRIN (Verified  Allergy, Unknown, 7/10/20)            Medications      Last Reconciled on 20 10:14 by LORY RICO      Sofosbuvir/Velpatasvir (Epclusa 400 mg-100 mg Tablet) 1 Each Tablet      1 TAB PO QDAY, #28 TAB 2 Refills         Prov: Lory Hurt cpx         20       traZODone HCl (traZODone HCl) 100 Mg Tablet      150 MG PO HS, #45 TAB 0 Refills         Reported         7/10/20       Sertraline HCl (Sertraline*) 20 Mg/1 Ml Oral.conc      100 MG PO QDAY, ML         Reported         7/10/20       Prazosin HCl (Prazosin HCl) 1 Mg Capsule      1 MG PO HS, #30 CAP 0 Refills         Reported         7/10/20       Gabapentin (Gabapentin) 250 Mg/5 Ml Solution      800 MG PO TID, #1440 ML 0 Refills         Reported         7/10/20       Cetirizine Hcl (CETIRIZINE HCL) 1 Mg/1 Ml Solution      10 MG PO QDAY, #300 ML         Reported         7/10/20       busPIRone HCl (busPIRone HCl) 5 Mg Tablet      10 MG PO TID, #180 TAB         Reported         7/10/20       Buprenorph-Nalox 8-2 Mg SL (Buprenorph-Nalox 8-2 Mg SL) 1 Each Tab.subl      2 TAB SL QDAY for 30 Days, #60 TAB.SL         Reported         7/10/20       amLODIPine (amLODIPine) 2.5 Mg Tablet      5 MG PO QDAY, #60 TAB 0 Refills         Reported         7/10/20       MDI-Albuterol (Proair HFA) 8.5 Gm Hfa.aer.ad      1 PUFFS INH RTBID, #1 MDI 0 Refills         Reported         7/10/20       Acetaminophen Es (ACETAMINOPHEN ES) 500 Mg Tablet      500 MG PO Q6H PRN for MILD PAIN  (1-3)/FEVER, #100 TAB         Reported         7/10/20            Vitals      Height 5 ft 7 in / 170.18 cm      Weight 188 lbs 15.970 oz / 85.729 kg      BSA 1.97 m2      BMI 29.6 kg/m2      Blood Pressure 127/75            Stroke - Family Hx:  Father      Social History:  Tobacco Use      Smoking status:  Current every day smoker      Substance use:  Denies use      Medical History:  Yes: Hx Hepatitis, Hx Hypertension            PREVENTION      Hx Influenza Vaccination:  No      Influenza Vaccine Declined:  Yes      2 or More Falls in Past Year?:  No      Fall Past Year with Injury?:  No      Hx Pneumococcal Vaccination:  No            General:  No Fatigue, No Weight Loss      HEENT:  No Dysphagia, No Visual Changes      Respiratory:  No Cough, No Dyspnea      Cardiology:  No Chest Pain, No Palpitations      Gastrointestinal:  No Diarrhea, No Constipation      Genitourinary:  No Dysuria, No Frequency      Musculoskeletal:  No Joint Tenderness, No Joint Stiffness      Endocrine:  No Cold Intolerance, No Fatigue      Hematologic:  No Bleeding, No Bruising      Psychologic:  No Anxiety, No Depression      Neurologic:  No Confusion, No Weakness      Skin:  No Rash, No Open Wounds            Mr. Stern is a 66 y/o male who presents for evaluation and treatment of chronic     HCV, GT 1a with F0 fibrosis.  He has completed 12 weeks of treatment with     epclusa and reports that he's doing well.  Denies any current side effects.            HEENT:  Atraumatic; No Scleral Icterus      Lungs:  CTAB, Breathing is unlabored      Abdomen:  Normal BS all 4 Quadrants, Soft      Cardiovascular:  Regular Rate and Rhythm; No Murmur      Constitutional:  Healthy appearing; No Acute Distress      Neurological:  Mental Status WNL, Alert+Ox3      Musculoskeletal:  Normal Bulk Strength, Normal Tone      Skin:  No Rash, No Swelling      Rectal:  Deferred            Lab Results      10/23/2020 CBC: Hemoglobin 13.7, hematocrit 42.2, platelets  246.      CMP: Creatinine 1.06, alk phos 84, AST 21, ALT 13, total bilirubin 0.22.  INR     0.88      HCV quant-not detected.            Janay Stiffness Consistent with:  F0-F1      CAP Score:  Moderate/Severe Liver Fat            Current Plan      Obtain labs today to ensure SVR.      Chronic hepatitis C         Chronic hepatitis C without hepatic coma         Hepatic coma status: without hepatic coma            Notes      New Diagnostics      * CBC, Stat         Dx: Chronic hepatitis C - B18.2      * Comp Metabolic Panel, Stat         Dx: Chronic hepatitis C - B18.2      * HCV RNA QUANTITATIVE HCPCR, Stat         Dx: Chronic hepatitis C - B18.2      * PT / INR, Stat         Dx: Chronic hepatitis C - B18.2      Patient Education Provided:  Yes      Patient Instructions:  Avoid Alcohol, Avoid Illicit Drug Use, Importance of     keeping appointments      Disposition:  F/U 12 weeks            Electronically signed by Lory Hurt  11/20/2020 10:14       Disclaimer: Converted document may not contain table formatting or lab diagrams. Please see [employer] System for the authenticated document.

## 2021-11-23 ENCOUNTER — APPOINTMENT (OUTPATIENT)
Dept: GENERAL RADIOLOGY | Facility: HOSPITAL | Age: 66
End: 2021-11-23

## 2021-11-23 ENCOUNTER — HOSPITAL ENCOUNTER (EMERGENCY)
Facility: HOSPITAL | Age: 66
Discharge: HOME | End: 2021-11-23
Attending: EMERGENCY MEDICINE | Admitting: EMERGENCY MEDICINE

## 2021-11-23 VITALS
HEIGHT: 68 IN | DIASTOLIC BLOOD PRESSURE: 88 MMHG | SYSTOLIC BLOOD PRESSURE: 136 MMHG | HEART RATE: 65 BPM | WEIGHT: 191.58 LBS | RESPIRATION RATE: 17 BRPM | TEMPERATURE: 99.3 F | BODY MASS INDEX: 29.04 KG/M2 | OXYGEN SATURATION: 96 %

## 2021-11-23 DIAGNOSIS — J18.9 PNEUMONIA DUE TO INFECTIOUS ORGANISM, UNSPECIFIED LATERALITY, UNSPECIFIED PART OF LUNG: Primary | ICD-10-CM

## 2021-11-23 DIAGNOSIS — J44.1 COPD EXACERBATION (HCC): ICD-10-CM

## 2021-11-23 LAB
ALBUMIN SERPL-MCNC: 4.1 G/DL (ref 3.5–5.2)
ALBUMIN/GLOB SERPL: 1.4 G/DL
ALP SERPL-CCNC: 101 U/L (ref 39–117)
ALT SERPL W P-5'-P-CCNC: 15 U/L (ref 1–41)
ANION GAP SERPL CALCULATED.3IONS-SCNC: 10.5 MMOL/L (ref 5–15)
AST SERPL-CCNC: 23 U/L (ref 1–40)
BASOPHILS # BLD AUTO: 0.04 10*3/MM3 (ref 0–0.2)
BASOPHILS NFR BLD AUTO: 0.6 % (ref 0–1.5)
BILIRUB SERPL-MCNC: 0.3 MG/DL (ref 0–1.2)
BUN SERPL-MCNC: 15 MG/DL (ref 8–23)
BUN/CREAT SERPL: 17.9 (ref 7–25)
CALCIUM SPEC-SCNC: 8.9 MG/DL (ref 8.6–10.5)
CHLORIDE SERPL-SCNC: 104 MMOL/L (ref 98–107)
CO2 SERPL-SCNC: 22.5 MMOL/L (ref 22–29)
CREAT SERPL-MCNC: 0.84 MG/DL (ref 0.76–1.27)
DEPRECATED RDW RBC AUTO: 44.6 FL (ref 37–54)
EOSINOPHIL # BLD AUTO: 0.17 10*3/MM3 (ref 0–0.4)
EOSINOPHIL NFR BLD AUTO: 2.6 % (ref 0.3–6.2)
ERYTHROCYTE [DISTWIDTH] IN BLOOD BY AUTOMATED COUNT: 14.5 % (ref 12.3–15.4)
GFR SERPL CREATININE-BSD FRML MDRD: 110 ML/MIN/1.73
GLOBULIN UR ELPH-MCNC: 2.9 GM/DL
GLUCOSE SERPL-MCNC: 116 MG/DL (ref 65–99)
HCT VFR BLD AUTO: 42.7 % (ref 37.5–51)
HGB BLD-MCNC: 14 G/DL (ref 13–17.7)
IMM GRANULOCYTES # BLD AUTO: 0.03 10*3/MM3 (ref 0–0.05)
IMM GRANULOCYTES NFR BLD AUTO: 0.5 % (ref 0–0.5)
LYMPHOCYTES # BLD AUTO: 0.95 10*3/MM3 (ref 0.7–3.1)
LYMPHOCYTES NFR BLD AUTO: 14.4 % (ref 19.6–45.3)
MCH RBC QN AUTO: 28.1 PG (ref 26.6–33)
MCHC RBC AUTO-ENTMCNC: 32.8 G/DL (ref 31.5–35.7)
MCV RBC AUTO: 85.6 FL (ref 79–97)
MONOCYTES # BLD AUTO: 0.51 10*3/MM3 (ref 0.1–0.9)
MONOCYTES NFR BLD AUTO: 7.7 % (ref 5–12)
NEUTROPHILS NFR BLD AUTO: 4.9 10*3/MM3 (ref 1.7–7)
NEUTROPHILS NFR BLD AUTO: 74.2 % (ref 42.7–76)
NRBC BLD AUTO-RTO: 0 /100 WBC (ref 0–0.2)
NT-PROBNP SERPL-MCNC: 609.6 PG/ML (ref 0–900)
PLATELET # BLD AUTO: 212 10*3/MM3 (ref 140–450)
PMV BLD AUTO: 11.2 FL (ref 6–12)
POTASSIUM SERPL-SCNC: 4.5 MMOL/L (ref 3.5–5.2)
PROT SERPL-MCNC: 7 G/DL (ref 6–8.5)
QT INTERVAL: 407 MS
RBC # BLD AUTO: 4.99 10*6/MM3 (ref 4.14–5.8)
SODIUM SERPL-SCNC: 137 MMOL/L (ref 136–145)
TROPONIN T SERPL-MCNC: <0.01 NG/ML (ref 0–0.03)
WBC NRBC COR # BLD: 6.6 10*3/MM3 (ref 3.4–10.8)

## 2021-11-23 PROCEDURE — 96374 THER/PROPH/DIAG INJ IV PUSH: CPT

## 2021-11-23 PROCEDURE — 85025 COMPLETE CBC W/AUTO DIFF WBC: CPT | Performed by: EMERGENCY MEDICINE

## 2021-11-23 PROCEDURE — 83880 ASSAY OF NATRIURETIC PEPTIDE: CPT | Performed by: EMERGENCY MEDICINE

## 2021-11-23 PROCEDURE — 80053 COMPREHEN METABOLIC PANEL: CPT | Performed by: EMERGENCY MEDICINE

## 2021-11-23 PROCEDURE — 94799 UNLISTED PULMONARY SVC/PX: CPT

## 2021-11-23 PROCEDURE — 71045 X-RAY EXAM CHEST 1 VIEW: CPT

## 2021-11-23 PROCEDURE — 25010000002 DEXAMETHASONE PER 1 MG: Performed by: EMERGENCY MEDICINE

## 2021-11-23 PROCEDURE — 84484 ASSAY OF TROPONIN QUANT: CPT | Performed by: EMERGENCY MEDICINE

## 2021-11-23 PROCEDURE — 94640 AIRWAY INHALATION TREATMENT: CPT

## 2021-11-23 PROCEDURE — 93005 ELECTROCARDIOGRAM TRACING: CPT | Performed by: EMERGENCY MEDICINE

## 2021-11-23 PROCEDURE — 99284 EMERGENCY DEPT VISIT MOD MDM: CPT

## 2021-11-23 PROCEDURE — 93010 ELECTROCARDIOGRAM REPORT: CPT | Performed by: INTERNAL MEDICINE

## 2021-11-23 RX ORDER — AMLODIPINE BESYLATE 5 MG/1
5 TABLET ORAL DAILY
COMMUNITY

## 2021-11-23 RX ORDER — BUPRENORPHINE AND NALOXONE 8; 2 MG/1; MG/1
1 FILM, SOLUBLE BUCCAL; SUBLINGUAL 2 TIMES DAILY
COMMUNITY

## 2021-11-23 RX ORDER — GABAPENTIN 800 MG/1
800 TABLET ORAL 3 TIMES DAILY
COMMUNITY

## 2021-11-23 RX ORDER — IPRATROPIUM BROMIDE AND ALBUTEROL SULFATE 2.5; .5 MG/3ML; MG/3ML
3 SOLUTION RESPIRATORY (INHALATION) ONCE
Status: COMPLETED | OUTPATIENT
Start: 2021-11-23 | End: 2021-11-23

## 2021-11-23 RX ORDER — PREDNISONE 50 MG/1
50 TABLET ORAL DAILY
Qty: 5 TABLET | Refills: 0 | Status: ON HOLD | OUTPATIENT
Start: 2021-11-23 | End: 2021-12-05

## 2021-11-23 RX ORDER — AZITHROMYCIN 250 MG/1
TABLET, FILM COATED ORAL
Qty: 6 TABLET | Refills: 0 | Status: ON HOLD | OUTPATIENT
Start: 2021-11-23 | End: 2021-12-05

## 2021-11-23 RX ORDER — AMOXICILLIN AND CLAVULANATE POTASSIUM 875; 125 MG/1; MG/1
1 TABLET, FILM COATED ORAL 2 TIMES DAILY
Qty: 14 TABLET | Refills: 0 | Status: ON HOLD | OUTPATIENT
Start: 2021-11-23 | End: 2021-12-05

## 2021-11-23 RX ORDER — BUSPIRONE HYDROCHLORIDE 15 MG/1
15 TABLET ORAL 3 TIMES DAILY
COMMUNITY

## 2021-11-23 RX ORDER — DEXAMETHASONE SODIUM PHOSPHATE 10 MG/ML
10 INJECTION INTRAMUSCULAR; INTRAVENOUS ONCE
Status: COMPLETED | OUTPATIENT
Start: 2021-11-23 | End: 2021-11-23

## 2021-11-23 RX ADMIN — IPRATROPIUM BROMIDE AND ALBUTEROL SULFATE 3 ML: .5; 2.5 SOLUTION RESPIRATORY (INHALATION) at 06:57

## 2021-11-23 RX ADMIN — IPRATROPIUM BROMIDE AND ALBUTEROL SULFATE 3 ML: .5; 2.5 SOLUTION RESPIRATORY (INHALATION) at 05:29

## 2021-11-23 RX ADMIN — DEXAMETHASONE SODIUM PHOSPHATE 10 MG: 10 INJECTION INTRAMUSCULAR; INTRAVENOUS at 06:52

## 2021-11-23 NOTE — ED PROVIDER NOTES
"Subjective   Josue Stern is a 66 y.o. male who presents to the emergency department today with complaints of shortness of breath. Pt states \"I am having an asthma attack, my inhaler isnt helping.\" He claims he has been feeling more short of breath over the past two days. After one breathing tx here pt reports significant relief. He has no other complaints at this time. He denies chest pain, fever, chills, diaphoresis, cough, headache, nausea, emesis, or any other pertinent sx or concerns.       History provided by:  Patient   used: No        Review of Systems   Constitutional: Negative for chills and fever.   HENT: Negative for congestion, rhinorrhea and sore throat.    Eyes: Negative for pain and visual disturbance.   Respiratory: Positive for shortness of breath. Negative for apnea, cough and chest tightness.    Cardiovascular: Negative for chest pain and palpitations.   Gastrointestinal: Negative for abdominal pain, diarrhea, nausea and vomiting.   Genitourinary: Negative for difficulty urinating and dysuria.   Musculoskeletal: Negative for joint swelling and myalgias.   Skin: Negative for color change.   Neurological: Negative for seizures and headaches.   Psychiatric/Behavioral: Negative.    All other systems reviewed and are negative.      Past Medical History:   Diagnosis Date   • Asthma    • Hypertension        Allergies   Allergen Reactions   • Aspirin Nausea Only       History reviewed. No pertinent surgical history.    History reviewed. No pertinent family history.    Social History     Socioeconomic History   • Marital status:    Tobacco Use   • Smoking status: Current Every Day Smoker     Packs/day: 0.50     Types: Cigarettes   • Smokeless tobacco: Never Used   Vaping Use   • Vaping Use: Every day   Substance and Sexual Activity   • Alcohol use: Never   • Drug use: Never   • Sexual activity: Defer         Objective   Physical Exam  Vitals and nursing note reviewed. "   Constitutional:       General: He is not in acute distress.     Appearance: Normal appearance. He is not toxic-appearing.   HENT:      Head: Normocephalic and atraumatic.      Jaw: There is normal jaw occlusion.   Eyes:      General: Lids are normal.      Extraocular Movements: Extraocular movements intact.      Conjunctiva/sclera: Conjunctivae normal.      Pupils: Pupils are equal, round, and reactive to light.   Cardiovascular:      Rate and Rhythm: Normal rate and regular rhythm.      Pulses: Normal pulses.      Heart sounds: Normal heart sounds.   Pulmonary:      Effort: Pulmonary effort is normal. No respiratory distress.      Breath sounds: Wheezing (mild) present. No rhonchi.   Abdominal:      General: Abdomen is flat.      Palpations: Abdomen is soft.      Tenderness: There is no abdominal tenderness. There is no guarding or rebound.   Musculoskeletal:         General: Normal range of motion.      Cervical back: Normal range of motion and neck supple.      Right lower leg: No edema.      Left lower leg: No edema.   Skin:     General: Skin is warm and dry.   Neurological:      Mental Status: He is alert and oriented to person, place, and time. Mental status is at baseline.   Psychiatric:         Mood and Affect: Mood normal.         Procedures         ED Course     Labs Reviewed   COMPREHENSIVE METABOLIC PANEL - Abnormal; Notable for the following components:       Result Value    Glucose 116 (*)     All other components within normal limits    Narrative:     GFR Normal >60  Chronic Kidney Disease <60  Kidney Failure <15     CBC WITH AUTO DIFFERENTIAL - Abnormal; Notable for the following components:    Lymphocyte % 14.4 (*)     All other components within normal limits   TROPONIN (IN-HOUSE) - Normal    Narrative:     Troponin T Reference Range:  <= 0.03 ng/mL-   Negative for AMI  >0.03 ng/mL-     Abnormal for myocardial necrosis.  Clinicians would have to utilize clinical acumen, EKG, Troponin and serial  changes to determine if it is an Acute Myocardial Infarction or myocardial injury due to an underlying chronic condition.       Results may be falsely decreased if patient taking Biotin.     BNP (IN-HOUSE) - Normal    Narrative:     Among patients with dyspnea, NT-proBNP is highly sensitive for the detection of acute congestive heart failure. In addition NT-proBNP of <300 pg/ml effectively rules out acute congestive heart failure with 99% negative predictive value.    Results may be falsely decreased if patient taking Biotin.     CBC AND DIFFERENTIAL    Narrative:     The following orders were created for panel order CBC & Differential.  Procedure                               Abnormality         Status                     ---------                               -----------         ------                     CBC Auto Differential[[id number]]        Abnormal            Final result                 Please view results for these tests on the individual orders.     XR Chest 1 View    Result Date: 11/23/2021  PROCEDURE: XR CHEST 1 VW  COMPARISON: [facility], , CHEST AP/PA 1 VIEW, 12/21/2020, 11:28.  INDICATIONS: SHORTNESS OF BREATH/WHEEZING  FINDINGS:  A small infiltrate is noted in the medial left lung base.  The right lung is clear.  The cardiac and mediastinal silhouettes appear normal.  CONCLUSION:  1. Small left basilar infiltrate       Remy Carr M.D.       Electronically Signed and Approved By: Remy Carr M.D. on 11/23/2021 at 7:09                                                    MDM  Number of Diagnoses or Management Options  COPD exacerbation (HCC)  Pneumonia due to infectious organism, unspecified laterality, unspecified part of lung  Diagnosis management comments: Hx of asthma. Last discharge from hospital was December 2020 for respiratory failure and COPD exacerbation. Hx of substance abuse.        Amount and/or Complexity of Data Reviewed  Clinical lab tests: reviewed  Tests in the  radiology section of CPT®: reviewed  Tests in the medicine section of CPT®: reviewed  Decide to obtain previous medical records or to obtain history from someone other than the patient: yes  Review and summarize past medical records: yes      Differential diagnosis includes pneumonia, COPD exacerbation, CHF, and pneumothorax among others.    Pulse oximetry interpretation: 96% SPO2 on room air at rest.  Normal.    Cardiac monitor interpretation: Normal sinus rhythm.  Rate 68.    Patient's work-up indicates a normal metabolic panel, normal CBC, normal BNP, and normal troponin.  Chest x-ray shows an infiltrate consistent with pneumonia.    On final reevaluation after breathing treatments the patient was feeling better and able to be discharged.    Final diagnoses:   Pneumonia due to infectious organism, unspecified laterality, unspecified part of lung   COPD exacerbation (HCC)       Documentation assistance provided by kelby Rose.  Information recorded by the scribe was done at my direction and has been verified and validated by me.     Mariam Rose  11/23/21 0622       Ronal Alberto DO  11/23/21 0637

## 2021-12-05 ENCOUNTER — APPOINTMENT (OUTPATIENT)
Dept: GENERAL RADIOLOGY | Facility: HOSPITAL | Age: 66
End: 2021-12-05

## 2021-12-05 ENCOUNTER — HOSPITAL ENCOUNTER (INPATIENT)
Facility: HOSPITAL | Age: 66
LOS: 5 days | Discharge: HOME | End: 2021-12-10
Attending: EMERGENCY MEDICINE | Admitting: INTERNAL MEDICINE

## 2021-12-05 DIAGNOSIS — R26.2 DIFFICULTY WALKING: ICD-10-CM

## 2021-12-05 DIAGNOSIS — D70.9 NEUTROPENIC FEVER (HCC): Primary | ICD-10-CM

## 2021-12-05 DIAGNOSIS — R50.81 NEUTROPENIC FEVER (HCC): Primary | ICD-10-CM

## 2021-12-05 DIAGNOSIS — Z78.9 DECREASED ACTIVITIES OF DAILY LIVING (ADL): ICD-10-CM

## 2021-12-05 DIAGNOSIS — R13.12 OROPHARYNGEAL DYSPHAGIA: ICD-10-CM

## 2021-12-05 DIAGNOSIS — J96.01 ACUTE RESPIRATORY FAILURE WITH HYPOXIA (HCC): ICD-10-CM

## 2021-12-05 LAB
ALBUMIN SERPL-MCNC: 3.5 G/DL (ref 3.5–5.2)
ALBUMIN/GLOB SERPL: 1.2 G/DL
ALP SERPL-CCNC: 82 U/L (ref 39–117)
ALT SERPL W P-5'-P-CCNC: 15 U/L (ref 1–41)
ANION GAP SERPL CALCULATED.3IONS-SCNC: 7.5 MMOL/L (ref 5–15)
AST SERPL-CCNC: 16 U/L (ref 1–40)
BASOPHILS # BLD AUTO: 0.02 10*3/MM3 (ref 0–0.2)
BASOPHILS NFR BLD AUTO: 3.4 % (ref 0–1.5)
BILIRUB SERPL-MCNC: 0.7 MG/DL (ref 0–1.2)
BILIRUB UR QL STRIP: NEGATIVE
BUN SERPL-MCNC: 16 MG/DL (ref 8–23)
BUN/CREAT SERPL: 14.4 (ref 7–25)
CALCIUM SPEC-SCNC: 8.4 MG/DL (ref 8.6–10.5)
CHLORIDE SERPL-SCNC: 101 MMOL/L (ref 98–107)
CLARITY UR: CLEAR
CO2 SERPL-SCNC: 23.5 MMOL/L (ref 22–29)
COLOR UR: YELLOW
CREAT SERPL-MCNC: 1.11 MG/DL (ref 0.76–1.27)
CRP SERPL-MCNC: 7.66 MG/DL (ref 0–0.5)
D-LACTATE SERPL-SCNC: 1.5 MMOL/L (ref 0.5–2)
DEPRECATED RDW RBC AUTO: 42.2 FL (ref 37–54)
EOSINOPHIL # BLD AUTO: 0.01 10*3/MM3 (ref 0–0.4)
EOSINOPHIL NFR BLD AUTO: 1.7 % (ref 0.3–6.2)
ERYTHROCYTE [DISTWIDTH] IN BLOOD BY AUTOMATED COUNT: 13.5 % (ref 12.3–15.4)
GFR SERPL CREATININE-BSD FRML MDRD: 80 ML/MIN/1.73
GLOBULIN UR ELPH-MCNC: 2.9 GM/DL
GLUCOSE SERPL-MCNC: 172 MG/DL (ref 65–99)
GLUCOSE UR STRIP-MCNC: NEGATIVE MG/DL
HCT VFR BLD AUTO: 40.8 % (ref 37.5–51)
HGB BLD-MCNC: 13.4 G/DL (ref 13–17.7)
HGB UR QL STRIP.AUTO: NEGATIVE
HOLD SPECIMEN: NORMAL
HOLD SPECIMEN: NORMAL
IMM GRANULOCYTES # BLD AUTO: 0 10*3/MM3 (ref 0–0.05)
IMM GRANULOCYTES NFR BLD AUTO: 0 % (ref 0–0.5)
KETONES UR QL STRIP: NEGATIVE
LARGE PLATELETS: NORMAL
LEUKOCYTE ESTERASE UR QL STRIP.AUTO: NEGATIVE
LYMPHOCYTES # BLD AUTO: 0.45 10*3/MM3 (ref 0.7–3.1)
LYMPHOCYTES NFR BLD AUTO: 76.3 % (ref 19.6–45.3)
MCH RBC QN AUTO: 27.9 PG (ref 26.6–33)
MCHC RBC AUTO-ENTMCNC: 32.8 G/DL (ref 31.5–35.7)
MCV RBC AUTO: 85 FL (ref 79–97)
MONOCYTES # BLD AUTO: 0.1 10*3/MM3 (ref 0.1–0.9)
MONOCYTES NFR BLD AUTO: 16.9 % (ref 5–12)
NEUTROPHILS NFR BLD AUTO: 0.01 10*3/MM3 (ref 1.7–7)
NEUTROPHILS NFR BLD AUTO: 1.7 % (ref 42.7–76)
NITRITE UR QL STRIP: NEGATIVE
NRBC BLD AUTO-RTO: 0 /100 WBC (ref 0–0.2)
PATHOLOGY REVIEW: YES
PH UR STRIP.AUTO: <=5 [PH] (ref 5–8)
PLATELET # BLD AUTO: 266 10*3/MM3 (ref 140–450)
PMV BLD AUTO: 10.5 FL (ref 6–12)
POTASSIUM SERPL-SCNC: 3.9 MMOL/L (ref 3.5–5.2)
PROT SERPL-MCNC: 6.4 G/DL (ref 6–8.5)
PROT UR QL STRIP: NEGATIVE
RBC # BLD AUTO: 4.8 10*6/MM3 (ref 4.14–5.8)
RBC MORPH BLD: NORMAL
SARS-COV-2 RNA RESP QL NAA+PROBE: NOT DETECTED
SMALL PLATELETS BLD QL SMEAR: ADEQUATE
SODIUM SERPL-SCNC: 132 MMOL/L (ref 136–145)
SP GR UR STRIP: 1.03 (ref 1–1.03)
UROBILINOGEN UR QL STRIP: NORMAL
WBC MORPH BLD: NORMAL
WBC NRBC COR # BLD: 0.59 10*3/MM3 (ref 3.4–10.8)
WHOLE BLOOD HOLD SPECIMEN: NORMAL
WHOLE BLOOD HOLD SPECIMEN: NORMAL

## 2021-12-05 PROCEDURE — 80053 COMPREHEN METABOLIC PANEL: CPT | Performed by: EMERGENCY MEDICINE

## 2021-12-05 PROCEDURE — 99285 EMERGENCY DEPT VISIT HI MDM: CPT

## 2021-12-05 PROCEDURE — 87807 RSV ASSAY W/OPTIC: CPT | Performed by: STUDENT IN AN ORGANIZED HEALTH CARE EDUCATION/TRAINING PROGRAM

## 2021-12-05 PROCEDURE — 85025 COMPLETE CBC W/AUTO DIFF WBC: CPT | Performed by: EMERGENCY MEDICINE

## 2021-12-05 PROCEDURE — 94799 UNLISTED PULMONARY SVC/PX: CPT

## 2021-12-05 PROCEDURE — 83605 ASSAY OF LACTIC ACID: CPT | Performed by: EMERGENCY MEDICINE

## 2021-12-05 PROCEDURE — 71045 X-RAY EXAM CHEST 1 VIEW: CPT

## 2021-12-05 PROCEDURE — 99223 1ST HOSP IP/OBS HIGH 75: CPT | Performed by: STUDENT IN AN ORGANIZED HEALTH CARE EDUCATION/TRAINING PROGRAM

## 2021-12-05 PROCEDURE — 86140 C-REACTIVE PROTEIN: CPT | Performed by: STUDENT IN AN ORGANIZED HEALTH CARE EDUCATION/TRAINING PROGRAM

## 2021-12-05 PROCEDURE — U0003 INFECTIOUS AGENT DETECTION BY NUCLEIC ACID (DNA OR RNA); SEVERE ACUTE RESPIRATORY SYNDROME CORONAVIRUS 2 (SARS-COV-2) (CORONAVIRUS DISEASE [COVID-19]), AMPLIFIED PROBE TECHNIQUE, MAKING USE OF HIGH THROUGHPUT TECHNOLOGIES AS DESCRIBED BY CMS-2020-01-R: HCPCS | Performed by: EMERGENCY MEDICINE

## 2021-12-05 PROCEDURE — 25010000002 VANCOMYCIN 5 G RECONSTITUTED SOLUTION: Performed by: EMERGENCY MEDICINE

## 2021-12-05 PROCEDURE — 85007 BL SMEAR W/DIFF WBC COUNT: CPT | Performed by: EMERGENCY MEDICINE

## 2021-12-05 PROCEDURE — 25010000002 CEFEPIME PER 500 MG: Performed by: EMERGENCY MEDICINE

## 2021-12-05 PROCEDURE — U0005 INFEC AGEN DETEC AMPLI PROBE: HCPCS | Performed by: EMERGENCY MEDICINE

## 2021-12-05 PROCEDURE — 94640 AIRWAY INHALATION TREATMENT: CPT

## 2021-12-05 PROCEDURE — 81003 URINALYSIS AUTO W/O SCOPE: CPT | Performed by: EMERGENCY MEDICINE

## 2021-12-05 RX ORDER — PROPRANOLOL HCL 60 MG
60 CAPSULE, EXTENDED RELEASE 24HR ORAL DAILY
COMMUNITY

## 2021-12-05 RX ORDER — IPRATROPIUM BROMIDE AND ALBUTEROL SULFATE 2.5; .5 MG/3ML; MG/3ML
SOLUTION RESPIRATORY (INHALATION)
Status: COMPLETED
Start: 2021-12-05 | End: 2021-12-07

## 2021-12-05 RX ORDER — IPRATROPIUM BROMIDE AND ALBUTEROL SULFATE 2.5; .5 MG/3ML; MG/3ML
3 SOLUTION RESPIRATORY (INHALATION) ONCE
Status: COMPLETED | OUTPATIENT
Start: 2021-12-05 | End: 2021-12-05

## 2021-12-05 RX ORDER — SERTRALINE HYDROCHLORIDE 100 MG/1
TABLET, FILM COATED ORAL
Status: ON HOLD | COMMUNITY
End: 2021-12-05

## 2021-12-05 RX ORDER — CYCLOBENZAPRINE HCL 10 MG
TABLET ORAL
Status: ON HOLD | COMMUNITY
End: 2021-12-05

## 2021-12-05 RX ORDER — MIRTAZAPINE 15 MG/1
15 TABLET, FILM COATED ORAL NIGHTLY
COMMUNITY

## 2021-12-05 RX ORDER — PRAZOSIN HYDROCHLORIDE 1 MG/1
1 CAPSULE ORAL NIGHTLY
COMMUNITY

## 2021-12-05 RX ORDER — BUPROPION HYDROCHLORIDE 300 MG/1
300 TABLET ORAL DAILY
COMMUNITY

## 2021-12-05 RX ORDER — ARIPIPRAZOLE 15 MG/1
15 TABLET ORAL DAILY
COMMUNITY
End: 2021-12-10 | Stop reason: HOSPADM

## 2021-12-05 RX ORDER — BUPRENORPHINE HYDROCHLORIDE AND NALOXONE HYDROCHLORIDE DIHYDRATE 8; 2 MG/1; MG/1
1 TABLET SUBLINGUAL DAILY
Status: DISCONTINUED | OUTPATIENT
Start: 2021-12-06 | End: 2021-12-06

## 2021-12-05 RX ORDER — TRAZODONE HYDROCHLORIDE 150 MG/1
TABLET ORAL
Status: ON HOLD | COMMUNITY
End: 2021-12-05

## 2021-12-05 RX ORDER — SODIUM CHLORIDE 0.9 % (FLUSH) 0.9 %
10 SYRINGE (ML) INJECTION AS NEEDED
Status: DISCONTINUED | OUTPATIENT
Start: 2021-12-05 | End: 2021-12-10 | Stop reason: HOSPADM

## 2021-12-05 RX ORDER — CEFEPIME 1 G/50ML
2 INJECTION, SOLUTION INTRAVENOUS EVERY 8 HOURS
Status: DISCONTINUED | OUTPATIENT
Start: 2021-12-06 | End: 2021-12-10 | Stop reason: HOSPADM

## 2021-12-05 RX ORDER — BUSPIRONE HYDROCHLORIDE 15 MG/1
15 TABLET ORAL EVERY 12 HOURS SCHEDULED
Status: DISCONTINUED | OUTPATIENT
Start: 2021-12-05 | End: 2021-12-06

## 2021-12-05 RX ORDER — SODIUM CHLORIDE 0.9 % (FLUSH) 0.9 %
10 SYRINGE (ML) INJECTION EVERY 12 HOURS SCHEDULED
Status: DISCONTINUED | OUTPATIENT
Start: 2021-12-05 | End: 2021-12-10 | Stop reason: HOSPADM

## 2021-12-05 RX ORDER — NITROGLYCERIN 0.4 MG/1
0.4 TABLET SUBLINGUAL
Status: DISCONTINUED | OUTPATIENT
Start: 2021-12-05 | End: 2021-12-10 | Stop reason: HOSPADM

## 2021-12-05 RX ORDER — CEFEPIME 1 G/50ML
2 INJECTION, SOLUTION INTRAVENOUS ONCE
Status: COMPLETED | OUTPATIENT
Start: 2021-12-05 | End: 2021-12-05

## 2021-12-05 RX ORDER — CETIRIZINE HYDROCHLORIDE 10 MG/1
10 TABLET ORAL DAILY
COMMUNITY

## 2021-12-05 RX ORDER — ACETAMINOPHEN 325 MG/1
650 TABLET ORAL EVERY 6 HOURS PRN
Status: DISCONTINUED | OUTPATIENT
Start: 2021-12-05 | End: 2021-12-10 | Stop reason: HOSPADM

## 2021-12-05 RX ADMIN — IPRATROPIUM BROMIDE AND ALBUTEROL SULFATE 3 ML: .5; 2.5 SOLUTION RESPIRATORY (INHALATION) at 15:23

## 2021-12-05 RX ADMIN — ACETAMINOPHEN 650 MG: 325 TABLET ORAL at 15:04

## 2021-12-05 RX ADMIN — BUSPIRONE HYDROCHLORIDE 15 MG: 15 TABLET ORAL at 23:06

## 2021-12-05 RX ADMIN — IPRATROPIUM BROMIDE AND ALBUTEROL SULFATE 3 ML: .5; 2.5 SOLUTION RESPIRATORY (INHALATION) at 15:22

## 2021-12-05 RX ADMIN — SODIUM CHLORIDE 1000 ML: 9 INJECTION, SOLUTION INTRAVENOUS at 16:03

## 2021-12-05 RX ADMIN — VANCOMYCIN HYDROCHLORIDE 1500 MG: 5 INJECTION, POWDER, LYOPHILIZED, FOR SOLUTION INTRAVENOUS at 16:04

## 2021-12-05 RX ADMIN — CEFEPIME 2 G: 1 INJECTION, SOLUTION INTRAVENOUS at 16:04

## 2021-12-05 RX ADMIN — CEFEPIME 2 G: 1 INJECTION, SOLUTION INTRAVENOUS at 23:06

## 2021-12-05 NOTE — ED PROVIDER NOTES
Time: 3:11 PM EST  Arrived by: Private vehicle  Chief Complaint: Weakness  History provided by: Patient and wife  History is limited by: N/A     History of Present Illness:  Patient is a 66 y.o. year old male that presents to the emergency department with increasing weakness and shortness of breath.  Patient reports he has become profoundly weak.  He states that he went to [employer] but could not stay awake.  He also states he has been short of breath and has been coughing.  Wife states prior to coming in he seemed confused.  They deny any fevers at home.  She also reports that his oxygen level was in the 70s prior to coming in.  Wife states he is also recently seen here in the ER was diagnosed with pneumonia approximately 2 weeks ago.  Patient denies any chest pain or abdominal pain.  Denies any vomiting or diarrhea.    HPI    Patient Care Team  Primary Care Provider: Provider, No Known    Past Medical History:     Allergies   Allergen Reactions   • Aspirin Nausea Only     Past Medical History:   Diagnosis Date   • Asthma    • Hypertension      History reviewed. No pertinent surgical history.  History reviewed. No pertinent family history.    Home Medications:  Prior to Admission medications    Medication Sig Start Date End Date Taking? Authorizing Provider   amLODIPine (Norvasc) 5 MG tablet Norvasc 5 mg oral tablet take 1 tablet (5 mg) by oral route once daily   Active    Provider, MD William   amoxicillin-clavulanate (AUGMENTIN) 875-125 MG per tablet Take 1 tablet by mouth 2 (Two) Times a Day. 11/23/21   Ronal Alberto DO   azithromycin (Zithromax Z-Tomasz) 250 MG tablet Take 2 tablets by mouth on day 1, then 1 tablet daily on days 2-5 11/23/21   Ronal Alberto DO   buprenorphine-naloxone (Suboxone) 8-2 MG film film Suboxone 8-2 mg sublingual film place 1 film by sublingual route once daily allow to dissolve slowly in mouth without chewing or swallowing   Active    Provider, MD William   busPIRone (BUSPAR) 15 MG  "tablet buspirone 15 mg oral tablet take 1 tablet (15 mg) by oral route 2 times per day   Active    Provider, MD William   Fluticasone Furoate-Vilanterol (BREO ELLIPTA) 100-25 MCG/INH inhaler Inhale 1 puff Daily. 1/6/21 1/6/22  ProviderWillaim MD   gabapentin (NEURONTIN) 800 MG tablet gabapentin 800 mg oral tablet take 1 tablet (800 mg) by oral route 3 times per day   Active    ProviderWilliam MD   predniSONE (DELTASONE) 50 MG tablet Take 1 tablet by mouth Daily. 11/23/21   Ronal Alberto DO        Social History:   Social History     Tobacco Use   • Smoking status: Current Every Day Smoker     Packs/day: 0.50     Types: Cigarettes   • Smokeless tobacco: Never Used   Vaping Use   • Vaping Use: Every day   Substance Use Topics   • Alcohol use: Never   • Drug use: Never       Review of Systems:  Review of Systems   Constitutional: Positive for fatigue. Negative for chills and fever.   HENT: Negative for congestion, ear pain and sore throat.    Eyes: Negative for pain.   Respiratory: Positive for cough and shortness of breath. Negative for chest tightness.    Cardiovascular: Negative for chest pain.   Gastrointestinal: Negative for abdominal pain, diarrhea, nausea and vomiting.   Genitourinary: Negative for flank pain and hematuria.   Musculoskeletal: Negative for joint swelling.   Skin: Negative for pallor.   Neurological: Positive for weakness. Negative for seizures and headaches.   All other systems reviewed and are negative.       Physical Exam:  /98   Pulse 100   Temp 98.2 °F (36.8 °C) (Oral)   Resp 12   Ht 170.2 cm (67\")   Wt 79.6 kg (175 lb 7.8 oz)   SpO2 95%   BMI 27.49 kg/m²     Physical Exam       Vital signs were reviewed under triage note.  General appearance - Patient appears well-developed and well-nourished.  Patient is ill-appearing.  Patient also appears to be in some mild respiratory distress.  Head - Normocephalic, atraumatic.  Pupils - Equal, round, reactive to light.  " Extraocular muscles are intact.  Conjunctive is clear.  Nasal - Normal inspection.  No evidence of trauma or epistaxis.  Tympanic membranes - Gray, intact without erythema or retractions.  Oral mucosa - Pink and moist without lesions or erythema.  Uvula is midline.  Chest wall - Atraumatic.  Chest wall is nontender.  There is no vesicular rashes noted.  Neck - Supple.  Trachea was midline.  There is no palpable lymphadenopathy or thyromegaly.  There are no meningeal signs  Lungs -mildly tachypneic.  Mild accessory muscle usage.  Breath sounds are diminished with some bibasilar rhonchi.  Heart -tachycardic rate but has a regular rhythm without any murmurs, clicks, or gallops.  Abdomen - Soft.  Bowel sounds are present.  There is no palpable tenderness.  There is no rebound, guarding, or rigidity.  There are no palpable masses.  There are no pulsatile masses.  Back - Spine is straight and midline.  There is no CVA tenderness.  Extremities - Intact x4 with full range of motion.  There is no palpable edema.  Pulses are intact x4 and equal.  Neurologic - Patient is awake, alert, and oriented x3.  Cranial nerves II through XII are grossly intact.  Motor and sensory functions grossly intact.  Cerebellar function was normal.  Integument - There are no rashes.  There are no petechia or purpura lesions noted.  There are no vesicular lesions noted.      Medications in the Emergency Department:  Medications   sodium chloride 0.9 % flush 10 mL (has no administration in time range)   acetaminophen (TYLENOL) tablet 650 mg (650 mg Oral Given 12/5/21 1504)   ipratropium-albuterol (DUO-NEB) 0.5-2.5 mg/3 ml nebulizer solution  - ADS Override Pull (has no administration in time range)   sodium chloride 0.9 % bolus 2,388 mL (1,000 mL Intravenous New Bag 12/5/21 1603)   cefepime (MAXIPIME) IVPB 2 g (premix) in 0.9% NaCl (0 g Intravenous Stopped 12/5/21 1742)   vancomycin 1500 mg/250 mL 0.9% NS IVPB ([facility]) (1,500 mg Intravenous Given  12/5/21 1604)   ipratropium-albuterol (DUO-NEB) nebulizer solution 3 mL (3 mL Nebulization Given 12/5/21 1523)   ipratropium-albuterol (DUO-NEB) nebulizer solution 3 mL (3 mL Nebulization Given 12/5/21 1522)        Labs  Lab Results (last 24 hours)     Procedure Component Value Units Date/Time    CBC & Differential [[id number]]  (Abnormal) Collected: 12/05/21 1555    Specimen: Blood Updated: 12/05/21 1650    Narrative:      The following orders were created for panel order CBC & Differential.  Procedure                               Abnormality         Status                     ---------                               -----------         ------                     CBC Auto Differential[[id number]]        Abnormal            Final result               Scan Slide[[id number]]                                       Final result                 Please view results for these tests on the individual orders.    Lactic Acid, Plasma [[id number]]  (Normal) Collected: 12/05/21 1555    Specimen: Blood Updated: 12/05/21 1730     Lactate 1.5 mmol/L     CBC Auto Differential [[id number]]  (Abnormal) Collected: 12/05/21 1555    Specimen: Blood Updated: 12/05/21 1644     WBC 0.59 10*3/mm3      RBC 4.80 10*6/mm3      Hemoglobin 13.4 g/dL      Hematocrit 40.8 %      MCV 85.0 fL      MCH 27.9 pg      MCHC 32.8 g/dL      RDW 13.5 %      RDW-SD 42.2 fl      MPV 10.5 fL      Platelets 266 10*3/mm3      Neutrophil % 1.7 %      Lymphocyte % 76.3 %      Monocyte % 16.9 %      Eosinophil % 1.7 %      Basophil % 3.4 %      Immature Grans % 0.0 %      Neutrophils, Absolute 0.01 10*3/mm3      Lymphocytes, Absolute 0.45 10*3/mm3      Monocytes, Absolute 0.10 10*3/mm3      Eosinophils, Absolute 0.01 10*3/mm3      Basophils, Absolute 0.02 10*3/mm3      Immature Grans, Absolute 0.00 10*3/mm3      nRBC 0.0 /100 WBC     Scan Slide [[id number]] Collected: 12/05/21 1555    Specimen: Blood Updated: 12/05/21 1650     RBC Morphology Normal     WBC Morphology  Normal     Platelet Estimate Adequate     Large Platelets Slight/1+    Peripheral Blood Smear [[id number]] Collected: 12/05/21 1555    Specimen: Blood Updated: 12/05/21 1736     Pathology Review Yes    Comprehensive Metabolic Panel [[id number]]  (Abnormal) Collected: 12/05/21 1701    Specimen: Blood Updated: 12/05/21 1734     Glucose 172 mg/dL      BUN 16 mg/dL      Creatinine 1.11 mg/dL      Sodium 132 mmol/L      Potassium 3.9 mmol/L      Chloride 101 mmol/L      CO2 23.5 mmol/L      Calcium 8.4 mg/dL      Total Protein 6.4 g/dL      Albumin 3.50 g/dL      ALT (SGPT) 15 U/L      AST (SGOT) 16 U/L      Alkaline Phosphatase 82 U/L      Total Bilirubin 0.7 mg/dL      eGFR  African Amer 80 mL/min/1.73      Globulin 2.9 gm/dL      A/G Ratio 1.2 g/dL      BUN/Creatinine Ratio 14.4     Anion Gap 7.5 mmol/L     Narrative:      GFR Normal >60  Chronic Kidney Disease <60  Kidney Failure <15      COVID-19,CEPHEID/WES/BDMAX,COR/WILLIAM/PAD/VARSHA IN-HOUSE(OR EMERGENT/ADD-ON),NP SWAB IN TRANSPORT MEDIA 3-4 HR TAT, RT-PCR - Swab, Nasopharynx [[id number]] Collected: 12/05/21 1737    Specimen: Swab from Nasopharynx Updated: 12/05/21 1744    Urinalysis With Culture If Indicated - Urine, Clean Catch [[id number]]  (Normal) Collected: 12/05/21 1738    Specimen: Urine, Clean Catch Updated: 12/05/21 1805     Color, UA Yellow     Appearance, UA Clear     pH, UA <=5.0     Specific Gravity, UA 1.026     Glucose, UA Negative     Ketones, UA Negative     Bilirubin, UA Negative     Blood, UA Negative     Protein, UA Negative     Leuk Esterase, UA Negative     Nitrite, UA Negative     Urobilinogen, UA 1.0 E.U./dL    Narrative:      Urine microscopic not indicated.           Imaging:  XR Chest 1 View    Result Date: 12/5/2021  PROCEDURE: XR CHEST 1 VW  COMPARISON: [facility], CR, XR CHEST 1 VW, 11/23/2021, 6:30.  INDICATIONS: HYPOXIA, FEVER  FINDINGS:  The heart size and pulmonary vascular markings are normal.  There is mild discoid  airspace disease in the lung bases likely secondary to atelectasis.  The upper lung zones are clear.  CONCLUSION: Mild basilar atelectasis       MARIA EUGENIA BEST MD       Electronically Signed and Approved By: MARIA EUGENIA BEST MD on 12/05/2021 at 15:18                EKG:      Procedures:  Procedures    Progress                      Patient was seen evaluated ED by me.  Above history and physical examination was performed as document.  The diagnostic data was obtained.  Results reviewed.  Discussed with the patient and his wife.  At this point time is unclear why the patient is neutropenic.  Patient is being treated for sepsis.  COVID-19 test is still pending.  Patient's history and physical findings are pointing also respiratory with him being hypoxic.  I believe if his white count is preventing pneumonic infiltrate from appearing on the chest x-ray at this time.  Patient may need further CT scanning's for additional delineation.  Have consult hospital service for primary admission.  They have asked that I consult Dr. Levi to see if she wants them to start fillgastrium.      Medical Decision Making:  MDM   Sepsis criteria was met in the emergency department and the Sepsis protocol (including antibiotic administration) was initiated.      SIRS criteria considered:   1.  Temperature > 100.4 or <98.6    2.  Heart Rate > 90    3.  Respiratory Rate > 22    4.  WBC > 12K or <4K.             Severe Sepsis:     Respiratory: Mechanical Ventilation or Bipap  Hypotension: SBP > 90 or MAP < 65  Renal: Creatinine > 2  Metabolic: Lactic Acid > 2  Hematologic: Platelets < 100K or INR > 1.5  Hepatic: BILI  >  2  CNS: Sudden AMS     Septic Shock:     Severe Sepsis + Persistent hypotension or Lactic Acid > 4     Normal saline bolus, Antibiotics, and final disposition was based on these definitions.        Sepsis was recognized at 1507    Antibiotics were ordered.       30 cc/kg bolus was  indicated.       Total Critical Care time of 45  minutes. Total critical care time documented does not include time spent on separately billed procedures for services of nurses or physician assistants. I personally saw and examined the patient. I have reviewed all diagnostic interpretations and treatment plans as written. I was present for the key portions of any procedures performed and the inclusive time noted in any critical care statement. Critical care time includes patient management by me, time spent at the patients bedside,  time to review lab and imaging results, discussing patient care, documentation in the medical record, and time spent with family or caregiver.    Final diagnoses:   Neutropenic fever (HCC)   Acute respiratory failure with hypoxia (HCC)        Disposition:  ED Disposition     ED Disposition Condition Comment    Decision to Admit  Level of Care: Telemetry [5]   Diagnosis: Neutropenic fever (HCC) [086013]   Certification: I Certify That Inpatient Hospital Services Are Medically Necessary For Greater Than 2 Midnights            Documentation assistance provided by Pradeep Bravo DO acting as scribe for Dr. Pradeep Bravo DO. Information recorded by the scribe was done at my direction and has been verified and validated by me.      Pradeep Bravo DO  12/07/21 1109

## 2021-12-06 ENCOUNTER — APPOINTMENT (OUTPATIENT)
Dept: CARDIOLOGY | Facility: HOSPITAL | Age: 66
End: 2021-12-06

## 2021-12-06 ENCOUNTER — APPOINTMENT (OUTPATIENT)
Dept: CT IMAGING | Facility: HOSPITAL | Age: 66
End: 2021-12-06

## 2021-12-06 ENCOUNTER — APPOINTMENT (OUTPATIENT)
Dept: GENERAL RADIOLOGY | Facility: HOSPITAL | Age: 66
End: 2021-12-06

## 2021-12-06 LAB
ALBUMIN SERPL-MCNC: 2.9 G/DL (ref 3.5–5.2)
ALBUMIN/GLOB SERPL: 1 G/DL
ALP SERPL-CCNC: 73 U/L (ref 39–117)
ALT SERPL W P-5'-P-CCNC: 14 U/L (ref 1–41)
ANION GAP SERPL CALCULATED.3IONS-SCNC: 7.9 MMOL/L (ref 5–15)
AST SERPL-CCNC: 21 U/L (ref 1–40)
BACTERIA SPEC RESP CULT: NORMAL
BASOPHILS # BLD AUTO: 0.02 10*3/MM3 (ref 0–0.2)
BASOPHILS NFR BLD AUTO: 2.4 % (ref 0–1.5)
BILIRUB SERPL-MCNC: 0.7 MG/DL (ref 0–1.2)
BUN SERPL-MCNC: 12 MG/DL (ref 8–23)
BUN/CREAT SERPL: 14.5 (ref 7–25)
CALCIUM SPEC-SCNC: 8 MG/DL (ref 8.6–10.5)
CHLORIDE SERPL-SCNC: 106 MMOL/L (ref 98–107)
CO2 SERPL-SCNC: 21.1 MMOL/L (ref 22–29)
CREAT SERPL-MCNC: 0.83 MG/DL (ref 0.76–1.27)
D DIMER PPP FEU-MCNC: 0.94 MG/L (FEU) (ref 0–0.59)
DEPRECATED RDW RBC AUTO: 47.4 FL (ref 37–54)
EOSINOPHIL # BLD AUTO: 0.02 10*3/MM3 (ref 0–0.4)
EOSINOPHIL NFR BLD AUTO: 2.4 % (ref 0.3–6.2)
ERYTHROCYTE [DISTWIDTH] IN BLOOD BY AUTOMATED COUNT: 13.9 % (ref 12.3–15.4)
ERYTHROCYTE [SEDIMENTATION RATE] IN BLOOD: 29 MM/HR (ref 0–20)
GFR SERPL CREATININE-BSD FRML MDRD: 112 ML/MIN/1.73
GLOBULIN UR ELPH-MCNC: 2.8 GM/DL
GLUCOSE SERPL-MCNC: 158 MG/DL (ref 65–99)
GRAM STN SPEC: NORMAL
HCT VFR BLD AUTO: 36.6 % (ref 37.5–51)
HGB BLD-MCNC: 11 G/DL (ref 13–17.7)
IMM GRANULOCYTES # BLD AUTO: 0 10*3/MM3 (ref 0–0.05)
IMM GRANULOCYTES NFR BLD AUTO: 0 % (ref 0–0.5)
L PNEUMO1 AG UR QL IA: NEGATIVE
LYMPHOCYTES # BLD AUTO: 0.69 10*3/MM3 (ref 0.7–3.1)
LYMPHOCYTES NFR BLD AUTO: 82.1 % (ref 19.6–45.3)
MAGNESIUM SERPL-MCNC: 1.8 MG/DL (ref 1.6–2.4)
MCH RBC QN AUTO: 28.1 PG (ref 26.6–33)
MCHC RBC AUTO-ENTMCNC: 30.1 G/DL (ref 31.5–35.7)
MCV RBC AUTO: 93.6 FL (ref 79–97)
MONOCYTES # BLD AUTO: 0.1 10*3/MM3 (ref 0.1–0.9)
MONOCYTES NFR BLD AUTO: 11.9 % (ref 5–12)
NEUTROPHILS NFR BLD AUTO: 0.01 10*3/MM3 (ref 1.7–7)
NEUTROPHILS NFR BLD AUTO: 1.2 % (ref 42.7–76)
NRBC BLD AUTO-RTO: 0 /100 WBC (ref 0–0.2)
PLATELET # BLD AUTO: 221 10*3/MM3 (ref 140–450)
PMV BLD AUTO: 10.7 FL (ref 6–12)
POTASSIUM SERPL-SCNC: 4.2 MMOL/L (ref 3.5–5.2)
PROCALCITONIN SERPL-MCNC: 0.39 NG/ML (ref 0–0.25)
PROT SERPL-MCNC: 5.7 G/DL (ref 6–8.5)
RBC # BLD AUTO: 3.91 10*6/MM3 (ref 4.14–5.8)
RSV AG SPEC QL: NEGATIVE
S PNEUM AG SPEC QL LA: NEGATIVE
SODIUM SERPL-SCNC: 135 MMOL/L (ref 136–145)
TSH SERPL DL<=0.05 MIU/L-ACNC: 0.26 UIU/ML (ref 0.27–4.2)
VANCOMYCIN TROUGH SERPL-MCNC: 5.31 MCG/ML (ref 5–20)
WBC NRBC COR # BLD: 0.84 10*3/MM3 (ref 3.4–10.8)

## 2021-12-06 PROCEDURE — 85025 COMPLETE CBC W/AUTO DIFF WBC: CPT | Performed by: STUDENT IN AN ORGANIZED HEALTH CARE EDUCATION/TRAINING PROGRAM

## 2021-12-06 PROCEDURE — 94799 UNLISTED PULMONARY SVC/PX: CPT

## 2021-12-06 PROCEDURE — 99223 1ST HOSP IP/OBS HIGH 75: CPT | Performed by: INTERNAL MEDICINE

## 2021-12-06 PROCEDURE — 25010000002 ENOXAPARIN PER 10 MG: Performed by: STUDENT IN AN ORGANIZED HEALTH CARE EDUCATION/TRAINING PROGRAM

## 2021-12-06 PROCEDURE — 25010000002 VANCOMYCIN 5 G RECONSTITUTED SOLUTION: Performed by: STUDENT IN AN ORGANIZED HEALTH CARE EDUCATION/TRAINING PROGRAM

## 2021-12-06 PROCEDURE — 80053 COMPREHEN METABOLIC PANEL: CPT | Performed by: STUDENT IN AN ORGANIZED HEALTH CARE EDUCATION/TRAINING PROGRAM

## 2021-12-06 PROCEDURE — 84443 ASSAY THYROID STIM HORMONE: CPT | Performed by: STUDENT IN AN ORGANIZED HEALTH CARE EDUCATION/TRAINING PROGRAM

## 2021-12-06 PROCEDURE — 87899 AGENT NOS ASSAY W/OPTIC: CPT | Performed by: STUDENT IN AN ORGANIZED HEALTH CARE EDUCATION/TRAINING PROGRAM

## 2021-12-06 PROCEDURE — 25010000002 CEFEPIME PER 500 MG: Performed by: STUDENT IN AN ORGANIZED HEALTH CARE EDUCATION/TRAINING PROGRAM

## 2021-12-06 PROCEDURE — 80202 ASSAY OF VANCOMYCIN: CPT | Performed by: STUDENT IN AN ORGANIZED HEALTH CARE EDUCATION/TRAINING PROGRAM

## 2021-12-06 PROCEDURE — 25010000002 KETOROLAC TROMETHAMINE PER 15 MG: Performed by: PHYSICIAN ASSISTANT

## 2021-12-06 PROCEDURE — 84145 PROCALCITONIN (PCT): CPT | Performed by: STUDENT IN AN ORGANIZED HEALTH CARE EDUCATION/TRAINING PROGRAM

## 2021-12-06 PROCEDURE — 83735 ASSAY OF MAGNESIUM: CPT | Performed by: STUDENT IN AN ORGANIZED HEALTH CARE EDUCATION/TRAINING PROGRAM

## 2021-12-06 PROCEDURE — 87205 SMEAR GRAM STAIN: CPT | Performed by: STUDENT IN AN ORGANIZED HEALTH CARE EDUCATION/TRAINING PROGRAM

## 2021-12-06 PROCEDURE — 85652 RBC SED RATE AUTOMATED: CPT | Performed by: STUDENT IN AN ORGANIZED HEALTH CARE EDUCATION/TRAINING PROGRAM

## 2021-12-06 PROCEDURE — 71250 CT THORAX DX C-: CPT

## 2021-12-06 PROCEDURE — 71045 X-RAY EXAM CHEST 1 VIEW: CPT

## 2021-12-06 PROCEDURE — 99233 SBSQ HOSP IP/OBS HIGH 50: CPT | Performed by: INTERNAL MEDICINE

## 2021-12-06 PROCEDURE — 85379 FIBRIN DEGRADATION QUANT: CPT | Performed by: NURSE PRACTITIONER

## 2021-12-06 PROCEDURE — 93306 TTE W/DOPPLER COMPLETE: CPT

## 2021-12-06 PROCEDURE — 94761 N-INVAS EAR/PLS OXIMETRY MLT: CPT

## 2021-12-06 RX ORDER — IBUPROFEN 400 MG/1
400 TABLET ORAL EVERY 8 HOURS PRN
Status: DISCONTINUED | OUTPATIENT
Start: 2021-12-06 | End: 2021-12-10 | Stop reason: HOSPADM

## 2021-12-06 RX ORDER — KETOROLAC TROMETHAMINE 15 MG/ML
15 INJECTION, SOLUTION INTRAMUSCULAR; INTRAVENOUS ONCE
Status: COMPLETED | OUTPATIENT
Start: 2021-12-06 | End: 2021-12-06

## 2021-12-06 RX ORDER — BUSPIRONE HYDROCHLORIDE 15 MG/1
15 TABLET ORAL EVERY 8 HOURS SCHEDULED
Status: DISCONTINUED | OUTPATIENT
Start: 2021-12-06 | End: 2021-12-10 | Stop reason: HOSPADM

## 2021-12-06 RX ORDER — BUDESONIDE 0.5 MG/2ML
0.5 INHALANT ORAL
Status: DISCONTINUED | OUTPATIENT
Start: 2021-12-06 | End: 2021-12-09

## 2021-12-06 RX ORDER — BUPRENORPHINE HYDROCHLORIDE AND NALOXONE HYDROCHLORIDE DIHYDRATE 8; 2 MG/1; MG/1
1 TABLET SUBLINGUAL 2 TIMES DAILY
Status: DISCONTINUED | OUTPATIENT
Start: 2021-12-06 | End: 2021-12-10 | Stop reason: HOSPADM

## 2021-12-06 RX ORDER — ARFORMOTEROL TARTRATE 15 UG/2ML
15 SOLUTION RESPIRATORY (INHALATION)
Status: DISCONTINUED | OUTPATIENT
Start: 2021-12-06 | End: 2021-12-09

## 2021-12-06 RX ORDER — IPRATROPIUM BROMIDE AND ALBUTEROL SULFATE 2.5; .5 MG/3ML; MG/3ML
3 SOLUTION RESPIRATORY (INHALATION)
Status: DISCONTINUED | OUTPATIENT
Start: 2021-12-06 | End: 2021-12-08

## 2021-12-06 RX ADMIN — BUSPIRONE HYDROCHLORIDE 15 MG: 15 TABLET ORAL at 16:04

## 2021-12-06 RX ADMIN — CEFEPIME 2 G: 1 INJECTION, SOLUTION INTRAVENOUS at 16:05

## 2021-12-06 RX ADMIN — ENOXAPARIN SODIUM 40 MG: 40 INJECTION SUBCUTANEOUS at 10:15

## 2021-12-06 RX ADMIN — IPRATROPIUM BROMIDE AND ALBUTEROL SULFATE 3 ML: .5; 2.5 SOLUTION RESPIRATORY (INHALATION) at 12:32

## 2021-12-06 RX ADMIN — BUDESONIDE 0.5 MG: 0.5 SUSPENSION RESPIRATORY (INHALATION) at 20:06

## 2021-12-06 RX ADMIN — SODIUM CHLORIDE, PRESERVATIVE FREE 10 ML: 5 INJECTION INTRAVENOUS at 10:15

## 2021-12-06 RX ADMIN — ARFORMOTEROL TARTRATE 15 MCG: 15 SOLUTION RESPIRATORY (INHALATION) at 20:06

## 2021-12-06 RX ADMIN — CEFEPIME 2 G: 1 INJECTION, SOLUTION INTRAVENOUS at 10:17

## 2021-12-06 RX ADMIN — BUSPIRONE HYDROCHLORIDE 15 MG: 15 TABLET ORAL at 21:08

## 2021-12-06 RX ADMIN — BUPRENORPHINE AND NALOXONE 1 TABLET: 8; 2 TABLET SUBLINGUAL at 09:14

## 2021-12-06 RX ADMIN — ACETAMINOPHEN 650 MG: 325 TABLET ORAL at 02:58

## 2021-12-06 RX ADMIN — VANCOMYCIN HYDROCHLORIDE 750 MG: 5 INJECTION, POWDER, LYOPHILIZED, FOR SOLUTION INTRAVENOUS at 03:01

## 2021-12-06 RX ADMIN — VANCOMYCIN HYDROCHLORIDE 1000 MG: 5 INJECTION, POWDER, LYOPHILIZED, FOR SOLUTION INTRAVENOUS at 18:06

## 2021-12-06 RX ADMIN — BUPRENORPHINE AND NALOXONE 1 TABLET: 8; 2 TABLET SUBLINGUAL at 01:54

## 2021-12-06 RX ADMIN — SODIUM CHLORIDE, PRESERVATIVE FREE 10 ML: 5 INJECTION INTRAVENOUS at 21:09

## 2021-12-06 RX ADMIN — KETOROLAC TROMETHAMINE 15 MG: 15 INJECTION, SOLUTION INTRAMUSCULAR; INTRAVENOUS at 04:59

## 2021-12-06 RX ADMIN — BUPRENORPHINE AND NALOXONE 1 TABLET: 8; 2 TABLET SUBLINGUAL at 21:08

## 2021-12-06 RX ADMIN — BUSPIRONE HYDROCHLORIDE 15 MG: 15 TABLET ORAL at 05:02

## 2021-12-06 RX ADMIN — IPRATROPIUM BROMIDE AND ALBUTEROL SULFATE 3 ML: .5; 2.5 SOLUTION RESPIRATORY (INHALATION) at 20:06

## 2021-12-06 RX ADMIN — IBUPROFEN 400 MG: 400 TABLET, FILM COATED ORAL at 18:04

## 2021-12-06 NOTE — H&P
[facility]  [facility]   Patient Name: Josue Stern  : 1955  MRN: [id number]  Primary Care Physician:  Provider, No Known  Date of admission: 2021    Subjective   Subjective     Chief Complaint: Generalized weakness    HPI:    Josue Stern is a 66 y.o. male with history of COPD, substance abuse, essential hypertension, lipidemia, and hep C who presented to the ED complaining of generalized weakness.  Patient states that he went to discharge after discharge he was feeling a little weak and on his way home driving he felt he was about to pass out.  Patient works at [employer] and he been getting his temperatures on daily basis and he said he never had a temperature that was high.  Patient got his posterior vaccine in the middle of November for the Covid.  Patient denies having any fever chills however he does have a cough.  Patient denies any urinary symptoms.  Patient denies any history of radiation chemotherapy or recent diagnosis with cancer.  In the ED he was found to have a fever of 103 and he broken sweats.  Patient noted to have a WBC of 0.59 and neutrophil level 1.7.  This critical lab values were communicated to the hematology oncologist Dr. Levi by the ED doctor.  As of now his Covid test has not come back yet.  Patient did not have any infiltrates on chest x-ray.  Patient looks dry and will repeat chest x-ray tomorrow morning after he was given fluids.  UA was negative as well.  In the ED he was given DuoNeb and a vancomycin and cefepime.      Personal History     Past Medical History:   Diagnosis Date   • Asthma    • Hypertension          History reviewed. No pertinent surgical history.      History reviewed. No pertinent family history.      Social History     Socioeconomic History   • Marital status:    Tobacco Use   • Smoking status: Current Every Day Smoker     Packs/day: 0.50     Types: Cigarettes   • Smokeless tobacco:  Never Used   Vaping Use   • Vaping Use: Every day   Substance and Sexual Activity   • Alcohol use: Never   • Drug use: Never   • Sexual activity: Defer         Home Medications:  Fluticasone Furoate-Vilanterol, amLODIPine, amoxicillin-clavulanate, azithromycin, buprenorphine-naloxone, busPIRone, cetirizine, cyclobenzaprine, gabapentin, predniSONE, propranolol LA, sertraline, and traZODone    Allergies:  Allergies   Allergen Reactions   • Aspirin Nausea Only       Review of Systems   Constitutional: Positive for chills, fatigue and fever.   HENT: Negative for postnasal drip, sinus pain, sore throat and trouble swallowing.    Eyes: Negative for pain, discharge and itching.   Respiratory: Positive for cough and shortness of breath. Negative for chest tightness and wheezing.    Cardiovascular: Negative for chest pain, palpitations and leg swelling.   Gastrointestinal: Negative for abdominal pain, blood in stool, constipation, diarrhea, nausea and vomiting.   Genitourinary: Negative for difficulty urinating, dysuria, frequency, hematuria and urgency.   Skin: Negative for color change, rash and wound.   Neurological: Positive for weakness. Negative for dizziness, tremors, seizures, syncope, facial asymmetry, speech difficulty, light-headedness, numbness and headaches.   Psychiatric/Behavioral: Negative for agitation and confusion. The patient is not nervous/anxious and is not hyperactive.         Objective   Objective     Vitals:   Temp:  [98.2 °F (36.8 °C)-103.1 °F (39.5 °C)] 98.2 °F (36.8 °C)  Heart Rate:  [] 100  Resp:  [12-22] 12  BP: (146)/(98) 146/98  Flow (L/min):  [6-10] 10    Physical Exam    Constitutional: Awake, alert, no acute distress   Eyes: Pupils equal, sclerae anicteric, no conjunctival injection   HENT: NCAT, mucous membranes moist   Neck: Supple, no thyromegaly, no lymphadenopathy, trachea midline   Respiratory: Rhonchi to auscultation bilaterally, nonlabored respirations    Cardiovascular: RRR,  no murmurs, rubs, or gallops, palpable pedal pulses bilaterally   Gastrointestinal: Positive bowel sounds, soft, nontender, nondistended   Musculoskeletal: No bilateral ankle edema, no clubbing or cyanosis to extremities   Psychiatric: Appropriate affect, cooperative   Neurologic: Oriented x 3, strength symmetric in all extremities, Cranial Nerves grossly intact to confrontation, speech clear   Skin: No rashes     Result Review    Result Review:  I have personally reviewed the results from the time of this admission to 12/5/2021 19:43 EST and agree with these findings:  [x]  Laboratory  []  Microbiology  [x]  Radiology  []  EKG/Telemetry   []  Cardiology/Vascular   []  Pathology  []  Old records  []  Other:    Imaging Results (Last 24 Hours)     Procedure Component Value Units Date/Time    XR Chest 1 View [[id number]] Collected: 12/05/21 1518     Updated: 12/05/21 1521    Narrative:      PROCEDURE: XR CHEST 1 VW     COMPARISON: [facility], CR, XR CHEST 1 VW, 11/23/2021, 6:30.     INDICATIONS: HYPOXIA, FEVER     FINDINGS:   The heart size and pulmonary vascular markings are normal.  There is mild discoid airspace disease   in the lung bases likely secondary to atelectasis.  The upper lung zones are clear.     CONCLUSION: Mild basilar atelectasis                  MARIA EUGENIA BEST MD         Electronically Signed and Approved By: MARIA EUGENIA BEST MD on 12/05/2021 at 15:18                            Assessment/Plan   Assessment / Plan     Assessment  #Neutropenic fever  #Most likely viral pneumonia  #History of hep C  #History of hypertension  #History of substance abuse        Plan:   -Admit to telemetry bed  -Continue vancomycin and cefepime  -Heme-onc consultation  -Blood cultures pending  -Sputum culture  -Urine strep pneumo and Legionella antigen  -Restart his home medications once reconciled  -Full code  -Regular diet  -Get CRP and sed rate  -Morning labs  -CMV  -RSV        DVT prophylaxis:  No DVT  prophylaxis order currently exists.    CODE STATUS:         Admission Status:  I believe this patient meets inpatient status.    Queenie Ryan MD

## 2021-12-06 NOTE — CONSULTS
"Nutrition Services    Patient Name: Josue Stern  YOB: 1955  MRN: [id number]  Admission date: 12/5/2021      CLINICAL NUTRITION ASSESSMENT      Reason for Assessment  MST score 2+     H&P:    Past Medical History:   Diagnosis Date   • Asthma    • Hypertension         Current Problems:   Active Hospital Problems    Diagnosis    • Neutropenic fever (HCC)         Nutrition/Diet History         Narrative     Pt triggered for MST score of 2 related to stated wt loss and decreased appetite. Per wt history pt has possible wt loss of 14# (7.3%) over the last two weeks. Pt states UBW is 180# +/- 5# which is largely consistent with wt history. Pt was not agreeable to NFPE. Pt states appetite has been moderately decreased but overall feels as though he has consumed roughly the same. Pt was agreeable to addition of Boost Glucose Control BID.     Anthropometrics        Current Height, Weight Height: 167.6 cm (66\")  Weight: 80.5 kg (177 lb 7.5 oz)   Current BMI Body mass index is 28.64 kg/m².       Weight Hx  Wt Readings from Last 30 Encounters:   12/06/21 0500 80.5 kg (177 lb 7.5 oz)   12/05/21 2100 80.8 kg (178 lb 2.1 oz)   12/05/21 1445 79.6 kg (175 lb 7.8 oz)   11/23/21 0506 86.9 kg (191 lb 9.3 oz)   11/20/20 0957 85.7 kg (189 lb)   11/11/20 0000 87.5 kg (193 lb)   10/29/20 0000 86.2 kg (190 lb)   10/23/20 1349 85.7 kg (189 lb)   09/25/20 0955 85.7 kg (189 lb)   09/01/20 0000 83.7 kg (184 lb 8 oz)   07/10/20 1000 85.7 kg (189 lb)            Wt Change Observation possible wt loss of 14# (7.3%) over the last two weeks.     Estimated/Assessed Needs       Energy Requirements 25-30 kcal/kg   EST Needs (kcal/day) 2012-2415       Protein Requirements 0.8-1 g/kg   EST Daily Needs (g/day) 65-81       Fluid Requirements 1 ml/kcal    Estimated Needs (mL/day) 2012     Labs/Medications         Pertinent Labs Reviewed.   Results from last 7 days   Lab Units 12/06/21  0600 12/05/21  1701   SODIUM mmol/L 135* 132* "   POTASSIUM mmol/L 4.2 3.9   CHLORIDE mmol/L 106 101   CO2 mmol/L 21.1* 23.5   BUN mg/dL 12 16   CREATININE mg/dL 0.83 1.11   CALCIUM mg/dL 8.0* 8.4*   BILIRUBIN mg/dL 0.7 0.7   ALK PHOS U/L 73 82   ALT (SGPT) U/L 14 15   AST (SGOT) U/L 21 16   GLUCOSE mg/dL 158* 172*     Results from last 7 days   Lab Units 12/06/21  0601 12/06/21  0600 12/05/21  1555   MAGNESIUM mg/dL  --  1.8  --    HEMOGLOBIN g/dL 11.0*  --    < >   HEMATOCRIT % 36.6*  --    < >    < > = values in this interval not displayed.     Lab Results   Component Value Date    HGBA1C 6.0 (H) 06/30/2020         Pertinent Medications Reviewed.     Current Nutrition Orders & Evaluation of Intake       Oral Nutrition     Current PO Diet Diet Regular   Supplement No active supplement orders       Nutrition Diagnosis         Nutrition Dx Problem 1 Inadequate energy Intake related to inadequate oral Intake as evidenced by decreased appetite. and patient report.       Nutrition Intervention         Provide Boost Glucose Control BID     Medical Nutrition Therapy/Nutrition Education          Learner     Readiness Patient  Acceptance     Method     Response Explanation  Verbalizes understanding     Monitor/Evaluation        Monitor Per protocol, PO intake, Supplement intake, Weight, Symptoms, POC/GOC       Nutrition Discharge Plan         To be determined       Electronically signed by:  She Mukherjee RD  12/06/21 13:41 EST

## 2021-12-06 NOTE — PROGRESS NOTES
[facility]   Hospitalist Progress Note  Date: 2021  Patient Name: Josue Stern  : 1955  MRN: [id number]  Date of admission: 2021      Subjective   Subjective     Chief Complaint: Weakness    Summary: 66-year-old male with hep C, substance abuse, COPD.  He presented to the ED complaining of generalized weakness.  He was being discharged but felt weak and on his way home driving felt like he was about to pass out.  In the ED he was febrile to 103.  Had neutropenia on labs.  Hematologist was contacted by the ED physician.  He was admitted to the medicine service.    Interval Followup: T-max 100.6 since his initial fever of 103.  Required 10 L of oxygen but down to 8 now.  Covid testing is negative.    Review of Systems   All systems were reviewed and negative except for: fatigue, body aches, chills, soa    Objective   Objective     Vitals:   Temp:  [97.7 °F (36.5 °C)-103.1 °F (39.5 °C)] 97.7 °F (36.5 °C)  Heart Rate:  [] 58  Resp:  [12-22] 20  BP: (126-160)/(72-98) 126/72  Flow (L/min):  [6-10] 10  Physical Exam    Constitutional: Awake, alert, no acute distress   Eyes: Pupils equal, sclerae anicteric, no conjunctival injection   HENT: NCAT, mucous membranes moist   Neck: Supple, no thyromegaly, no lymphadenopathy, trachea midline   Respiratory: clear but diminished; down to 8L   Cardiovascular: RRR, no murmurs, rubs, or gallops, palpable pedal pulses bilaterally   Gastrointestinal: Positive bowel sounds, soft, nontender, nondistended   Musculoskeletal: No bilateral ankle edema, no clubbing or cyanosis to extremities   Psychiatric: Appropriate affect, cooperative   Neurologic: Oriented x 3, strength symmetric in all extremities, Cranial Nerves grossly intact to confrontation, speech clear   Skin: No rashes     Result Review    Result Review:  I have personally reviewed the results from the time of this admission to 2021 09:59 EST and agree with these findings:  [x]   Laboratory  [x]  Microbiology  [x]  Radiology  []  EKG/Telemetry   []  Cardiology/Vascular   []  Pathology  []  Old records  []  Other:    Assessment/Plan   Assessment / Plan     Assessment:  • Neutropenic fever  • Acute hypoxic respiratory failure  • Hepatitis C  • History of substance abuse per chart  • HTN  • COPD    Plan:  · Admitted to Medicine  · Continue vancomycin and cefepime.  Await blood cultures.  · COVID, Legionella, urine strep, RSV all negative  · Will check for flu  · Will repeat a COVID this evening given clinical picture   · Have ordered enhanced airborne isolation until repeat covid and flu are back.  He is fully vaccinated including booster for covid so less likely but given clinical picture will repeat.  · Consult pulmonology for respiratory failure  · Hematology contacted from the ED last night and consult was placed.  Dr. Levi called me this morning and based her on chart review felt that neutropenia was from infection and that patient does not need Neupogen, did not see any other heme/onc issues.  I have cancelled the formal consult and can consult hematology later if needed.    Discussed plan with RN.    DVT prophylaxis:  Medical DVT prophylaxis orders are present.    CODE STATUS:   Level Of Support Discussed With: Patient  Code Status (Patient has no pulse and is not breathing): CPR (Attempt to Resuscitate)  Medical Interventions (Patient has pulse or is breathing): Full Support      Electronically signed by Ronal Fischer MD, 12/06/21, 2:57 PM EST.

## 2021-12-06 NOTE — NURSING NOTE
Spoke to ELIDA Mckinley regarding CMV PCR BRONCHUS LAVAGE orders. PA stated to disregard order at this time as results cannot be obtained through standard PCR. If a sputum is collected, it can be added on. Verbalized understanding. Will continue to monitor closely. MARY CARMEN ROBERTS RN

## 2021-12-06 NOTE — CONSULTS
Pulmonary / Critical Care Consult Note      Patient Name: Josue Stern  : 1955  MRN: [id number]  Primary Care Physician:  Forrest Duong MD  Referring Physician: No ref. provider found  Date of admission: 2021    Subjective   Subjective     Reason for Consult/ Chief Complaint: hypoxia    HPI:  Josue Stern is a 66 y.o. male with past medical history of Asthma, HTN, and Hep C presented to the ED with complaints of generalized weakness and confusion.  In the ED he was febrile at 103.1, procal as 0.39 and WBC 0.84. He was also found to be hypoxic on 6 L NC.  He reported that he was treated 2 weeks ago for pneumonia.   He completed Augmentin, Azithromycin, and Prednisone taper. Because of the hypoxia our services was consulted for further evaluation and treatment.  Upon exam, he is lying in bed on 6 L NC with O2 sats 98%.  He complains of fever, headache, dyspnea, and intermittent loss of train of thought.  He denies any hemoptysis, chest pain, nausea, vomiting, or diarrhea.  He is a current smoker of 3 cigarettes daily. He has cut back from 3 packs every 3 days. He denies any recent contacts.    Review of Systems  Constitutional symptoms:  fever, headache, fatigue, weakness, otherwise denied complaints   Ear, nose, throat: Denied complaints  Cardiovascular:  Denied complaints  Respiratory: dyspnea, nonproductive cough, otherwise denied complaints  Gastrointestinal: Denied complaints  Musculoskeletal: Denied complaints  Genitourinary: Denied complaints  Allergy / Immunology: Denied complaints  Hematologic: Denied complaints  Neurologic: Denied complaints  Skin: Denied complaints  Endocrine: Denied complaints  Psychiatric: Denied complaints    Personal History     Past Medical History:   Diagnosis Date   • Asthma    • Hypertension        History reviewed. No pertinent surgical history.    Family History: family history is not on file. Otherwise pertinent FHx was reviewed and not  pertinent to current issue. Father,  from brain cancer. No family history or lung disease or cancer.    Social History:  reports that he has been smoking cigarettes. He has been smoking about 0.50 packs per day. He has never used smokeless tobacco. He reports that he does not drink alcohol and does not use drugs. Smokes 3 cigarettes daily.  History of 15 pack year.    Home Medications:  ARIPiprazole, amLODIPine, buPROPion XL, buprenorphine-naloxone, busPIRone, cetirizine, gabapentin, mirtazapine, prazosin, and propranolol LA    Allergies:  Allergies   Allergen Reactions   • Aspirin Nausea Only       Objective    Objective     Vitals:   Temp:  [97.7 °F (36.5 °C)-103.1 °F (39.5 °C)] 97.7 °F (36.5 °C)  Heart Rate:  [] 66  Resp:  [12-22] 20  BP: (126-160)/(72-98) 126/72  Flow (L/min):  [6-10] 8    Physical Exam:  Vital Signs Reviewed   General: WDWN male, Awake and Alert, NAD on 4 L NC    HEENT:  PERRL, EOMI.  OP, nares clear, no sinus tenderness  Neck:  Supple, no JVD, no thyromegaly  Lymph: no axillary, cervical, supraclavicular lymphadenopathy noted bilaterally  Chest:  good aeration, diminished bilaterally, tympanic to percussion bilaterally, no work of breathing noted  CV: RRR, no MGR, pulses 2+, equal  Abd:  Soft, NT, ND, + BS, no HSM  EXT:  no clubbing, no cyanosis, no edema, no joint tenderness  Neuro:  A&Ox3, CN grossly intact, no focal deficits  Skin: No rashes or lesions noted    Result Review    Result Review:  I have personally reviewed the results from the time of this admission to 2021 13:03 EST and agree with these findings:  [x]  Laboratory  [x]  Microbiology  [x]  Radiology  [x]  EKG/Telemetry   []  Cardiology/Vascular   []  Pathology  []  Old records  []  Other:  Most notable findings include: WBC 0.84, ESR 29, procal 0.39, Hgb 11.0, Mg 1.8, K 4.2, Cr 0.83, ALT 14, AST 21     CXR increasing patchy bibasilar airspace disease concerning for pneumonia    Assessment/Plan    Assessment / Plan     Active Hospital Problems:  Active Hospital Problems    Diagnosis    • Neutropenic fever (HCC)      Impression:  Acute hypoxic respiratory failure requiring HFNC  Community acquired pneumonia of unspecified organism  Rule out sepsis  Neutropenia  HTN  Hx of Hepatitis C    Plan:  Continue to wean O2 to keep sats >90%.  COVID, RSV, and strep/legionella negative. Pending blood cultures, sputum cultures, and flu.  Will check MRSA PCR. Continue Vancomycin and Cefepime.  De-escalate based off cultures.  Will obtain CT chest with contrast.  Check proBNP.  Obtain echocardiogram.  Check DDimer.  Will start Pulmicort, Brovana, and Duonebs.  Continue bronchopulmonary hygiene.  Encourage IS and flutter valve.  Encourage activity.  Up to chair as tolerated.    Labs, microbiology, radiology, medications, and provider notes personally reviewed.  Discussed with primary services and bedside RN.    Thank you for this consult and allowing me to participate in the care of Mr. Stern.    I, Norma BERRY , am scribing for Dr. Meredith on 12/6/21.     Portions of this note were scribed by,  Norma BERRY, who was present on rounds with me. I personally reviewed the entirety of the documentation & made changes where appropriate. The documentation, as is signed by myself, accurately reflects my involvement in the patients care today.    Electronically signed by TRISHA Pascual, 12/06/21, 4:24 PM EST.  Electronically signed by Refugio Meredith MD, 12/6/2021, 13:03 EST.

## 2021-12-06 NOTE — NURSING NOTE
Notified ELIDA Mckinley that pt temp is 100.1 and /89 . Explained pt is complaining of a headache, states that tylenol has been ineffective in relieving his headache. PA verbalized understanding, stating she would review chart and placed orders as appropriate. Verbalized understanding. Updated pt. MARY CARMEN ROBERTS RN

## 2021-12-06 NOTE — PLAN OF CARE
Goal Outcome Evaluation:   Pt in stable condition at this time. No major changes or events. Will continue to monitor closely. MARY CARMEN ROBERTS RN

## 2021-12-06 NOTE — NURSING NOTE
Notified PA She Mckinley of critical WBC 0.84, trending upward from 0.59. PA verbalized understanding. No new orders at this time. MARY CARMEN ROBERTS RN

## 2021-12-07 LAB
ALBUMIN SERPL-MCNC: 3 G/DL (ref 3.5–5.2)
ALBUMIN/GLOB SERPL: 1 G/DL
ALP SERPL-CCNC: 74 U/L (ref 39–117)
ALT SERPL W P-5'-P-CCNC: 13 U/L (ref 1–41)
ANION GAP SERPL CALCULATED.3IONS-SCNC: 6.9 MMOL/L (ref 5–15)
AORTIC DIMENSIONLESS INDEX: 0.9 (DI)
AST SERPL-CCNC: 15 U/L (ref 1–40)
BASOPHILS # BLD AUTO: 0.02 10*3/MM3 (ref 0–0.2)
BASOPHILS NFR BLD AUTO: 3.1 % (ref 0–1.5)
BH CV ECHO MEAS - AO MAX PG: 8 MMHG
BH CV ECHO MEAS - AO MEAN PG: 4 MMHG
BH CV ECHO MEAS - AO ROOT DIAM: 3.1 CM
BH CV ECHO MEAS - AO V2 MAX: 139 CM/SEC
BH CV ECHO MEAS - AO V2 VTI: 24.7 CM
BH CV ECHO MEAS - AVA PLANIMETRY TRACED: 3 CM2
BH CV ECHO MEAS - EDV(MOD-SP2): 84.5 ML
BH CV ECHO MEAS - EDV(MOD-SP4): 88.1 ML
BH CV ECHO MEAS - EF(MOD-BP): 65 %
BH CV ECHO MEAS - ESV(MOD-SP2): 29 ML
BH CV ECHO MEAS - ESV(MOD-SP4): 30.1 ML
BH CV ECHO MEAS - IVSD: 1.1 CM
BH CV ECHO MEAS - LA A2CS (ATRIAL LENGTH): 6.4 CM
BH CV ECHO MEAS - LA DIMENSION(2D): 3.9 CM
BH CV ECHO MEAS - LAT PEAK E' VEL: 15.1 CM/SEC
BH CV ECHO MEAS - LV MAX PG: 6 MMHG
BH CV ECHO MEAS - LV MEAN PG: 4 MMHG
BH CV ECHO MEAS - LV V1 MAX: 127 CM/SEC
BH CV ECHO MEAS - LV V1 VTI: 21.6 CM
BH CV ECHO MEAS - LVIDD: 5 CM
BH CV ECHO MEAS - LVIDS: 3.2 CM
BH CV ECHO MEAS - LVOT DIAM: 2.1 CM
BH CV ECHO MEAS - LVPWD: 1.1 CM
BH CV ECHO MEAS - MED PEAK E' VEL: 10 CM/SEC
BH CV ECHO MEAS - MV A MAX VEL: 72.8 CM/SEC
BH CV ECHO MEAS - MV DEC TIME: 232 MSEC
BH CV ECHO MEAS - MV E MAX VEL: 63.8 CM/SEC
BH CV ECHO MEAS - MV E/A: 0.9
BH CV ECHO MEAS - MV MAX PG: 3 MMHG
BH CV ECHO MEAS - MV MEAN PG: 1 MMHG
BH CV ECHO MEAS - MV P1/2T: 61 MSEC
BH CV ECHO MEAS - MV V2 VTI: 19 CM
BH CV ECHO MEAS - MVA(P1/2T): 3.6 CM2
BH CV ECHO MEAS - PA V2 MAX: 75 CM/SEC
BH CV ECHO MEAS - RVDD: 3.8 CM
BH CV ECHO MEASUREMENTS AVERAGE E/E' RATIO: 5.08
BILIRUB SERPL-MCNC: 0.4 MG/DL (ref 0–1.2)
BUN SERPL-MCNC: 9 MG/DL (ref 8–23)
BUN/CREAT SERPL: 12.7 (ref 7–25)
CALCIUM SPEC-SCNC: 8.4 MG/DL (ref 8.6–10.5)
CHLORIDE SERPL-SCNC: 106 MMOL/L (ref 98–107)
CO2 SERPL-SCNC: 23.1 MMOL/L (ref 22–29)
CREAT SERPL-MCNC: 0.71 MG/DL (ref 0.76–1.27)
CYTO UR: NORMAL
DEPRECATED RDW RBC AUTO: 40 FL (ref 37–54)
EOSINOPHIL # BLD AUTO: 0.1 10*3/MM3 (ref 0–0.4)
EOSINOPHIL NFR BLD AUTO: 15.6 % (ref 0.3–6.2)
ERYTHROCYTE [DISTWIDTH] IN BLOOD BY AUTOMATED COUNT: 13.2 % (ref 12.3–15.4)
FLUAV AG NPH QL: NEGATIVE
FLUBV AG NPH QL IA: NEGATIVE
GFR SERPL CREATININE-BSD FRML MDRD: 134 ML/MIN/1.73
GLOBULIN UR ELPH-MCNC: 2.9 GM/DL
GLUCOSE SERPL-MCNC: 123 MG/DL (ref 65–99)
HCT VFR BLD AUTO: 34.2 % (ref 37.5–51)
HETEROPH AB SER QL LA: NEGATIVE
HGB BLD-MCNC: 11.3 G/DL (ref 13–17.7)
HIV1+2 AB SER QL: NORMAL
IMM GRANULOCYTES # BLD AUTO: 0.02 10*3/MM3 (ref 0–0.05)
IMM GRANULOCYTES NFR BLD AUTO: 3.1 % (ref 0–0.5)
IVRT: 92 MSEC
LAB AP CASE REPORT: NORMAL
LAB AP CLINICAL INFORMATION: NORMAL
LARGE PLATELETS: NORMAL
LEFT ATRIUM VOLUME INDEX: 37.5 ML/M2
LEFT ATRIUM VOLUME: 74.7 CM3
LYMPHOCYTES # BLD AUTO: 0.38 10*3/MM3 (ref 0.7–3.1)
LYMPHOCYTES NFR BLD AUTO: 59.4 % (ref 19.6–45.3)
MAXIMAL PREDICTED HEART RATE: 153 BPM
MCH RBC QN AUTO: 27.6 PG (ref 26.6–33)
MCHC RBC AUTO-ENTMCNC: 33 G/DL (ref 31.5–35.7)
MCV RBC AUTO: 83.6 FL (ref 79–97)
MONOCYTES # BLD AUTO: 0.09 10*3/MM3 (ref 0.1–0.9)
MONOCYTES NFR BLD AUTO: 14.1 % (ref 5–12)
MRSA DNA SPEC QL NAA+PROBE: NORMAL
NEUTROPHILS NFR BLD AUTO: 0.03 10*3/MM3 (ref 1.7–7)
NEUTROPHILS NFR BLD AUTO: 4.7 % (ref 42.7–76)
NRBC BLD AUTO-RTO: 0 /100 WBC (ref 0–0.2)
NT-PROBNP SERPL-MCNC: 714 PG/ML (ref 0–900)
PATH REPORT.FINAL DX SPEC: NORMAL
PATH REPORT.GROSS SPEC: NORMAL
PLATELET # BLD AUTO: 234 10*3/MM3 (ref 140–450)
PMV BLD AUTO: 10.4 FL (ref 6–12)
POTASSIUM SERPL-SCNC: 3.7 MMOL/L (ref 3.5–5.2)
PROCALCITONIN SERPL-MCNC: 0.29 NG/ML (ref 0–0.25)
PROT SERPL-MCNC: 5.9 G/DL (ref 6–8.5)
RBC # BLD AUTO: 4.09 10*6/MM3 (ref 4.14–5.8)
RBC MORPH BLD: NORMAL
SARS-COV-2 N GENE RESP QL NAA+PROBE: NOT DETECTED
SMALL PLATELETS BLD QL SMEAR: ADEQUATE
SODIUM SERPL-SCNC: 136 MMOL/L (ref 136–145)
STRESS TARGET HR: 130 BPM
VANCOMYCIN TROUGH SERPL-MCNC: 7.56 MCG/ML (ref 5–20)
WBC MORPH BLD: NORMAL
WBC NRBC COR # BLD: 0.64 10*3/MM3 (ref 3.4–10.8)

## 2021-12-07 PROCEDURE — 25010000002 KETOROLAC TROMETHAMINE PER 15 MG: Performed by: PHYSICIAN ASSISTANT

## 2021-12-07 PROCEDURE — 85025 COMPLETE CBC W/AUTO DIFF WBC: CPT | Performed by: INTERNAL MEDICINE

## 2021-12-07 PROCEDURE — 92610 EVALUATE SWALLOWING FUNCTION: CPT

## 2021-12-07 PROCEDURE — 99233 SBSQ HOSP IP/OBS HIGH 50: CPT | Performed by: INTERNAL MEDICINE

## 2021-12-07 PROCEDURE — 80202 ASSAY OF VANCOMYCIN: CPT | Performed by: STUDENT IN AN ORGANIZED HEALTH CARE EDUCATION/TRAINING PROGRAM

## 2021-12-07 PROCEDURE — 84145 PROCALCITONIN (PCT): CPT | Performed by: INTERNAL MEDICINE

## 2021-12-07 PROCEDURE — 25010000002 VANCOMYCIN 5 G RECONSTITUTED SOLUTION: Performed by: STUDENT IN AN ORGANIZED HEALTH CARE EDUCATION/TRAINING PROGRAM

## 2021-12-07 PROCEDURE — 87635 SARS-COV-2 COVID-19 AMP PRB: CPT | Performed by: INTERNAL MEDICINE

## 2021-12-07 PROCEDURE — 25010000002 CEFEPIME PER 500 MG: Performed by: STUDENT IN AN ORGANIZED HEALTH CARE EDUCATION/TRAINING PROGRAM

## 2021-12-07 PROCEDURE — 99233 SBSQ HOSP IP/OBS HIGH 50: CPT | Performed by: NURSE PRACTITIONER

## 2021-12-07 PROCEDURE — 94799 UNLISTED PULMONARY SVC/PX: CPT

## 2021-12-07 PROCEDURE — G0432 EIA HIV-1/HIV-2 SCREEN: HCPCS | Performed by: PHYSICIAN ASSISTANT

## 2021-12-07 PROCEDURE — 87641 MR-STAPH DNA AMP PROBE: CPT | Performed by: NURSE PRACTITIONER

## 2021-12-07 PROCEDURE — 87804 INFLUENZA ASSAY W/OPTIC: CPT | Performed by: INTERNAL MEDICINE

## 2021-12-07 PROCEDURE — 94761 N-INVAS EAR/PLS OXIMETRY MLT: CPT

## 2021-12-07 PROCEDURE — 25010000002 ENOXAPARIN PER 10 MG: Performed by: STUDENT IN AN ORGANIZED HEALTH CARE EDUCATION/TRAINING PROGRAM

## 2021-12-07 PROCEDURE — 86308 HETEROPHILE ANTIBODY SCREEN: CPT | Performed by: PHYSICIAN ASSISTANT

## 2021-12-07 PROCEDURE — 80053 COMPREHEN METABOLIC PANEL: CPT | Performed by: INTERNAL MEDICINE

## 2021-12-07 PROCEDURE — 83880 ASSAY OF NATRIURETIC PEPTIDE: CPT | Performed by: NURSE PRACTITIONER

## 2021-12-07 PROCEDURE — 85007 BL SMEAR W/DIFF WBC COUNT: CPT | Performed by: INTERNAL MEDICINE

## 2021-12-07 PROCEDURE — 25010000002 FILGRASTIM PER 300 MCG: Performed by: INTERNAL MEDICINE

## 2021-12-07 RX ORDER — KETOROLAC TROMETHAMINE 15 MG/ML
15 INJECTION, SOLUTION INTRAMUSCULAR; INTRAVENOUS ONCE
Status: COMPLETED | OUTPATIENT
Start: 2021-12-07 | End: 2021-12-07

## 2021-12-07 RX ADMIN — ACETAMINOPHEN 650 MG: 325 TABLET ORAL at 04:37

## 2021-12-07 RX ADMIN — IPRATROPIUM BROMIDE AND ALBUTEROL SULFATE 3 ML: .5; 2.5 SOLUTION RESPIRATORY (INHALATION) at 18:24

## 2021-12-07 RX ADMIN — CEFEPIME 2 G: 1 INJECTION, SOLUTION INTRAVENOUS at 09:07

## 2021-12-07 RX ADMIN — BUPRENORPHINE AND NALOXONE 1 TABLET: 8; 2 TABLET SUBLINGUAL at 09:07

## 2021-12-07 RX ADMIN — FILGRASTIM 300 MCG: 300 INJECTION, SOLUTION INTRAVENOUS; SUBCUTANEOUS at 09:07

## 2021-12-07 RX ADMIN — BUDESONIDE 0.5 MG: 0.5 SUSPENSION RESPIRATORY (INHALATION) at 06:48

## 2021-12-07 RX ADMIN — IPRATROPIUM BROMIDE AND ALBUTEROL SULFATE 3 ML: .5; 2.5 SOLUTION RESPIRATORY (INHALATION) at 12:00

## 2021-12-07 RX ADMIN — BUSPIRONE HYDROCHLORIDE 15 MG: 15 TABLET ORAL at 21:54

## 2021-12-07 RX ADMIN — VANCOMYCIN HYDROCHLORIDE 1750 MG: 5 INJECTION, POWDER, LYOPHILIZED, FOR SOLUTION INTRAVENOUS at 15:56

## 2021-12-07 RX ADMIN — CEFEPIME 2 G: 1 INJECTION, SOLUTION INTRAVENOUS at 00:02

## 2021-12-07 RX ADMIN — BUSPIRONE HYDROCHLORIDE 15 MG: 15 TABLET ORAL at 05:15

## 2021-12-07 RX ADMIN — BUDESONIDE 0.5 MG: 0.5 SUSPENSION RESPIRATORY (INHALATION) at 18:24

## 2021-12-07 RX ADMIN — BUPRENORPHINE AND NALOXONE 1 TABLET: 8; 2 TABLET SUBLINGUAL at 20:36

## 2021-12-07 RX ADMIN — KETOROLAC TROMETHAMINE 15 MG: 15 INJECTION, SOLUTION INTRAMUSCULAR; INTRAVENOUS at 05:51

## 2021-12-07 RX ADMIN — SODIUM CHLORIDE, PRESERVATIVE FREE 10 ML: 5 INJECTION INTRAVENOUS at 09:07

## 2021-12-07 RX ADMIN — ARFORMOTEROL TARTRATE 15 MCG: 15 SOLUTION RESPIRATORY (INHALATION) at 18:24

## 2021-12-07 RX ADMIN — IBUPROFEN 400 MG: 400 TABLET, FILM COATED ORAL at 15:55

## 2021-12-07 RX ADMIN — BUSPIRONE HYDROCHLORIDE 15 MG: 15 TABLET ORAL at 14:17

## 2021-12-07 RX ADMIN — IPRATROPIUM BROMIDE AND ALBUTEROL SULFATE 3 ML: .5; 2.5 SOLUTION RESPIRATORY (INHALATION) at 06:49

## 2021-12-07 RX ADMIN — SODIUM CHLORIDE, PRESERVATIVE FREE 10 ML: 5 INJECTION INTRAVENOUS at 20:38

## 2021-12-07 RX ADMIN — CEFEPIME 2 G: 1 INJECTION, SOLUTION INTRAVENOUS at 17:58

## 2021-12-07 RX ADMIN — ARFORMOTEROL TARTRATE 15 MCG: 15 SOLUTION RESPIRATORY (INHALATION) at 06:49

## 2021-12-07 RX ADMIN — ENOXAPARIN SODIUM 40 MG: 40 INJECTION SUBCUTANEOUS at 09:07

## 2021-12-07 RX ADMIN — IPRATROPIUM BROMIDE AND ALBUTEROL SULFATE 3 ML: .5; 2.5 SOLUTION RESPIRATORY (INHALATION) at 00:36

## 2021-12-07 RX ADMIN — VANCOMYCIN HYDROCHLORIDE 1000 MG: 5 INJECTION, POWDER, LYOPHILIZED, FOR SOLUTION INTRAVENOUS at 04:25

## 2021-12-07 NOTE — PROGRESS NOTES
"Pharmacy to Dose Vancomycin Day: 2    Josue Stern is a 66 y.o.male admitted with increased weakness and shortness of breath. Pharmacy has been consulted to dose IV Vancomycin     Consulting Provider: Queenie Ryan  Clinical Indication: Febrile Neutropenia   Pertinent Past Medical History: Wife states he is also recently seen here in the ER was diagnosed with   pneumonia approximately 2 weeks ago  Goal -600 mg/L.hr  Duration of therapy: TBD    167.6 cm (66\")       12/06/21  0500      Weight: 80.5 kg (177 lb 7.5 oz)         Estimated Creatinine Clearance: 86.1 mL/min (by C-G formula based on SCr of 0.83 mg/dL).    HD/PD/CRRT?: No    Lab Results   Component Value Date    WBC 0.84 (C) 12/06/2021      Temperature    12/06/21 0300 12/06/21 0500 12/06/21 0807   Temp: 100.1 °F (37.8 °C) 98.5 °F (36.9 °C) 97.7 °F (36.5 °C)        Contrast Administered: No    Relevant Micro:   Microbiology Results (last 10 days)       Procedure Component Value - Date/Time    Legionella Antigen, Urine - Urine, Urine, Clean Catch [[id number]]  (Normal) Collected: 12/06/21 0031    Lab Status: Final result Specimen: Urine, Clean Catch Updated: 12/06/21 0818     LEGIONELLA ANTIGEN, URINE Negative    S. Pneumo Ag Urine or CSF - Urine, Urine, Clean Catch [[id number]]  (Normal) Collected: 12/06/21 0031    Lab Status: Final result Specimen: Urine, Clean Catch Updated: 12/06/21 0818     Strep Pneumo Ag Negative    RSV Screen - Swab, Nasopharynx [[id number]]  (Normal) Collected: 12/05/21 2307    Lab Status: Final result Specimen: Swab from Nasopharynx Updated: 12/06/21 0115     RSV Rapid Ag Negative    COVID-19,CEPHEID/WES/BDMAX,COR/WILLIAM/PAD/VARSHA IN-HOUSE(OR EMERGENT/ADD-ON),NP SWAB IN TRANSPORT MEDIA 3-4 HR TAT, RT-PCR - Swab, Nasopharynx [[id number]]  (Normal) Collected: 12/05/21 6147    Lab Status: Final result Specimen: Swab from Nasopharynx Updated: 12/05/21 1926     COVID19 Not Detected    Narrative:      Fact sheet for providers: " https://www.fda.gov/media/283455/download     Fact sheet for patients: https://www.fda.gov/media/674081/download  Fact sheet for providers: https://www.fda.gov/media/152494/download     Fact sheet for patients: https://www.fda.gov/media/404252/download             Relevant Radiology: Mild basilar atelectasis    Other Antimicrobial Therapy: Cefepime 2 g q8h  Assessment/Plan  Loading dose: vancomycin 1500 mg   Regimen: 1750mg q12h   Exposure Target: AUC24 (range) 400-600 mg/L.hr  AUC24,ss: 530 mg/L.hr  PAUC*: 94 %  Ctrough,ss: 12.4 mg/L  Pconc*: 5 %  Tox.: 8 %    Lab Results   Component Value Date    [facility] 7.56 12/07/2021     Note: Random level remains subtherapeutic following dose increase. Augmented drug clearance, likely secondary to recent cocaine use. Will increase dose to 1750mg q12h for above parameters with estimated AUC of 530mg/L.hr. Will recheck random level tomorrow to reassess clearance and need for dose adjustment.     Level due: Level ordered for 12/8 at 1300  Labs ordered: CMP ordered for tomorrow morning

## 2021-12-07 NOTE — PLAN OF CARE
Goal Outcome Evaluation:              Outcome Summary: Afebrile, no acute distress, no issues presented.me

## 2021-12-07 NOTE — PLAN OF CARE
Goal Outcome Evaluation:  Plan of Care Reviewed With: patient      DYSPHAGIA CRITERIA: Swallow appears grossly functional for nutritional needs.  No overt clinical signs or symptoms of aspiration were noted at the bedside.  Note silent aspiration cannot be ruled out at the bedside.    ASSESSMENT/ PLAN OF CARE: No direct speech therapy is recommended at this time. Recommend rereferral should patient demonstrate change in status.    RECOMMENDATIONS:   1.   DIET: Diet of regular solids and thin liquids alternating small bites and small sips while sitting fully upright.

## 2021-12-07 NOTE — PROGRESS NOTES
[facility]   Hospitalist Progress Note  Date: 2021  Patient Name: Josue Stern  : 1955  MRN: [id number]  Date of admission: 2021      Subjective   Subjective     Chief Complaint: Weakness    Summary: 66-year-old male with hep C, substance abuse, COPD.  He presented to the ED complaining of generalized weakness.  He was being discharged but felt weak and on his way home driving felt like he was about to pass out.  In the ED he was febrile to 103.  Had neutropenia on labs.  Hematologist was contacted by the ED physician.  He was admitted to the medicine service.    Interval Followup: Patient awake alert oriented afebrile denies chest pain shortness of air abdominal pain.  Remains neutropenic we will check mono screen we will check HIV status additionally will repeat Covid and check flu screen    Review of Systems   All systems were reviewed and negative except for: fatigue, body aches, chills, soa    Objective   Objective     Vitals:   Temp:  [97.3 °F (36.3 °C)-99.3 °F (37.4 °C)] 97.8 °F (36.6 °C)  Heart Rate:  [57-78] 67  Resp:  [18-20] 18  BP: (118-148)/(68-86) 130/68  Flow (L/min):  [2-6] 2  Physical Exam    Constitutional: Awake, alert, no acute distress   Eyes: Pupils equal, sclerae anicteric, no conjunctival injection   HENT: NCAT, mucous membranes moist   Neck: Supple, no thyromegaly, no lymphadenopathy, trachea midline   Respiratory: clear but diminished;    Cardiovascular: RRR, no murmurs, rubs, or gallops, palpable pedal pulses bilaterally   Gastrointestinal: Positive bowel sounds, soft, nontender, nondistended   Musculoskeletal: No bilateral ankle edema, no clubbing or cyanosis to extremities   Psychiatric: Appropriate affect, cooperative   Neurologic: Oriented x 3, strength symmetric in all extremities, Cranial Nerves grossly intact to confrontation, speech clear   Skin: No rashes     Result Review    Result Review:  I have personally reviewed the results from the time of  this admission to 12/7/2021 12:42 EST and agree with these findings:  [x]  Laboratory  [x]  Microbiology  [x]  Radiology  []  EKG/Telemetry   []  Cardiology/Vascular   []  Pathology  []  Old records  []  Other:    Assessment/Plan   Assessment / Plan     Assessment:  • Neutropenic fever  • Acute hypoxic respiratory failure  • Hepatitis C  • History of substance abuse per chart  • HTN  • COPD    Plan:  · Continue vancomycin and cefepime pending final culture results  · Repeat Covid check flu ordered yesterday but not completed notified nursing  · Check HIV status check mono screen  · Place in enhanced airborne isolation pending repeat Covid and flu screen  · Pulmonary consultation appreciated  · Neupogen started monitor leukocyte count  · Discussed with RN discussed with pulmonary      DVT prophylaxis:  Medical DVT prophylaxis orders are present.    CODE STATUS:   Level Of Support Discussed With: Patient  Code Status (Patient has no pulse and is not breathing): CPR (Attempt to Resuscitate)  Medical Interventions (Patient has pulse or is breathing): Full Support      Electronically signed by ELIDA Bell, 12/07/21, 12:49 PM EST.    Attending Note:  I have seen and examined the patient and agree with the above information from Chuy Rowland PA-C and have outlined plan of care.  Pleasant 66-year-old male presented with fever and weakness.  Found to have neutropenia.  Imaging suggest pneumonia.  I ordered Covid swab and flu swab yesterday, orders were acknowledged but never carried out.  Those were done today and fortunately negative.  Additionally, HIV was negative.  Remains neutropenic today, Neupogen ordered.  Hope to see improvement in the next 48 hours.  Clinically improving.  He is on 10 L yesterday, down to 2 L today and looks much better.  He sitting up on the bed, has some diminished breath sounds with few coarse breath sounds at the bases but otherwise no distress, no stridor, nontoxic-appearing.  Clemente  with patient and family.  Electronically signed by Ronal Fischer MD, 12/07/21, 7:51 PM EST.

## 2021-12-07 NOTE — PROGRESS NOTES
Pulmonary / Critical Care Progress Note      Patient Name: Josue Stern  : 1955  MRN: [id number]  Primary Care Physician:  Forrest Duong MD  Date of admission: 2021    Subjective   Subjective   Follow-up for hypoxia    Over past 24 hours, has been able to wean O2.  Continues on antibiotics and nebulizers.    No acute events over night.    This morning,  Lying in bed  On 5 L NC with O2 sats 97%  Decreased to 3 L   Feeling better  Nonproductive cough  Denies any choking or aspiration with food or liquids  No chest pain  No fever or chills  Weak and fatigued    Review of Systems  General:  + Fatigue, No Fever  HEENT:  No Dysphagia, No Visual Changes  Respiratory:  + Cough, + Dyspnea, No Pleuritic Pain  Cardiovascular:  No Chest Pain, No Palpitations, No FERNANDEZ, No Chest Pressure  Gastrointestinal:  No Abdominal Pain, No Nausea, No Vomiting, No Diarrhea  Genitourinary:  No Dysuria, No Frequency, No Hesitancy  Musculoskeletal:  No Joint Tenderness, No Joint Stiffness, + weakness    Objective   Objective     Vitals:   Temp:  [97.3 °F (36.3 °C)-99.3 °F (37.4 °C)] 97.9 °F (36.6 °C)  Heart Rate:  [57-78] 63  Resp:  [18-20] 20  BP: (118-148)/(73-86) 133/79  Flow (L/min):  [4-8] 5    Physical Exam   Vital Signs Reviewed   General: WDWN male, Awake and Alert, NAD on 3 L NC    HEENT:  PERRL, EOMI.  OP, nares clear, no sinus tenderness  Neck:  Supple, no JVD, no thyromegaly  Chest:  good aeration, clear to auscultation bilaterally, tympanic to percussion bilaterally, no work of breathing noted  CV: RRR, no MGR, pulses 2+, equal  Abd:  Soft, NT, ND, + BS, no HSM  EXT:  no clubbing, no cyanosis, no edema  Neuro:  A&Ox3, CN grossly intact, no focal deficits  Skin: No rashes or lesions noted    Result Review    Result Review:  I have personally reviewed the results from the time of this admission to 2021 11:11 EST and agree with these findings:  [x]  Laboratory  [x]  Microbiology  [x]  Radiology  [x]   EKG/Telemetry   []  Cardiology/Vascular   []  Pathology  []  Old records  []  Other:  Most notable findings include:  WBC 0.64, Hgb 11.03,   Procal 0.29  DDimer 0.94  proBNP 714    Cr 0.71, K 3.7    CT chest bilateral bibasilar infiltrates, aspiration pneumonia  Mild to moderate emphysema changes    Pending cultures    Assessment/Plan   Assessment / Plan     Active Hospital Problems:  Active Hospital Problems    Diagnosis    • Neutropenic fever (HCC)      Impression:  Acute hypoxic respiratory failure requiring HFNC  Community acquired pneumonia of unspecified organism  Rule out sepsis  Neutropenia  HTN  Hx of Hepatitis C    Plan:  Continue to wean O2 to keep sats >90%.  CT chest reviewed with bilateral bibasilar infiltrates with concern for aspiration.  Will consult speech therapy.  COVID, RSV, and strep/legionella negative. Procal 0.29.  Pending blood cultures, sputum cultures,flu, and MRSA PCR. Continue Vancomycin and Cefepime.  Cna de-escalate based off cultures.  WBC 0.64.  Will give Neupogen x1.  proBNP 714.  Pending echocardiogram.  DDimer 0.94.  Continue Pulmicort, Brovana, and Duonebs.  Continue bronchopulmonary hygiene.  Encourage IS and flutter valve.  Aspiration precautions.   Encourage activity.  Up to chair as tolerated.      DVT prophylaxis:  Medical DVT prophylaxis orders are present.    CODE STATUS:   Level Of Support Discussed With: Patient  Code Status (Patient has no pulse and is not breathing): CPR (Attempt to Resuscitate)  Medical Interventions (Patient has pulse or is breathing): Full Support    Labs, microbiology, radiology, medications, and provider notes personally reviewed.  Discussed with primary services and bedside RN.    Electronically signed by TRISHA Pascual, 12/07/21, 11:11 AM EST.  Electronically signed by Refugio Meredith MD, 12/07/21, 3:32 PM EST.

## 2021-12-08 LAB
ALBUMIN SERPL-MCNC: 3.3 G/DL (ref 3.5–5.2)
ALBUMIN/GLOB SERPL: 1.2 G/DL
ALP SERPL-CCNC: 79 U/L (ref 39–117)
ALT SERPL W P-5'-P-CCNC: 6 U/L (ref 1–41)
ANION GAP SERPL CALCULATED.3IONS-SCNC: 6 MMOL/L (ref 5–15)
AST SERPL-CCNC: 21 U/L (ref 1–40)
BASOPHILS # BLD AUTO: 0.02 10*3/MM3 (ref 0–0.2)
BASOPHILS NFR BLD AUTO: 1.5 % (ref 0–1.5)
BILIRUB SERPL-MCNC: 0.3 MG/DL (ref 0–1.2)
BUN SERPL-MCNC: 7 MG/DL (ref 8–23)
BUN/CREAT SERPL: 8.9 (ref 7–25)
CALCIUM SPEC-SCNC: 8.7 MG/DL (ref 8.6–10.5)
CHLORIDE SERPL-SCNC: 108 MMOL/L (ref 98–107)
CO2 SERPL-SCNC: 26 MMOL/L (ref 22–29)
CREAT SERPL-MCNC: 0.79 MG/DL (ref 0.76–1.27)
DEPRECATED RDW RBC AUTO: 41 FL (ref 37–54)
EOSINOPHIL # BLD AUTO: 0.16 10*3/MM3 (ref 0–0.4)
EOSINOPHIL NFR BLD AUTO: 12.2 % (ref 0.3–6.2)
ERYTHROCYTE [DISTWIDTH] IN BLOOD BY AUTOMATED COUNT: 13.3 % (ref 12.3–15.4)
FOLATE SERPL-MCNC: 10.9 NG/ML (ref 4.78–24.2)
GFR SERPL CREATININE-BSD FRML MDRD: 119 ML/MIN/1.73
GLOBULIN UR ELPH-MCNC: 2.8 GM/DL
GLUCOSE SERPL-MCNC: 119 MG/DL (ref 65–99)
HCT VFR BLD AUTO: 34.8 % (ref 37.5–51)
HGB BLD-MCNC: 11.4 G/DL (ref 13–17.7)
IMM GRANULOCYTES # BLD AUTO: 0 10*3/MM3 (ref 0–0.05)
IMM GRANULOCYTES NFR BLD AUTO: 0 % (ref 0–0.5)
IRON 24H UR-MRATE: 15 MCG/DL (ref 59–158)
IRON SATN MFR SERPL: 7 % (ref 20–50)
LARGE PLATELETS: NORMAL
LYMPHOCYTES # BLD AUTO: 0.82 10*3/MM3 (ref 0.7–3.1)
LYMPHOCYTES NFR BLD AUTO: 62.6 % (ref 19.6–45.3)
MAGNESIUM SERPL-MCNC: 1.8 MG/DL (ref 1.6–2.4)
MCH RBC QN AUTO: 27.5 PG (ref 26.6–33)
MCHC RBC AUTO-ENTMCNC: 32.8 G/DL (ref 31.5–35.7)
MCV RBC AUTO: 84.1 FL (ref 79–97)
MONOCYTES # BLD AUTO: 0.12 10*3/MM3 (ref 0.1–0.9)
MONOCYTES NFR BLD AUTO: 9.2 % (ref 5–12)
NEUTROPHILS NFR BLD AUTO: 0.19 10*3/MM3 (ref 1.7–7)
NEUTROPHILS NFR BLD AUTO: 14.5 % (ref 42.7–76)
NRBC BLD AUTO-RTO: 0 /100 WBC (ref 0–0.2)
PHOSPHATE SERPL-MCNC: 2.2 MG/DL (ref 2.5–4.5)
PLATELET # BLD AUTO: 261 10*3/MM3 (ref 140–450)
PMV BLD AUTO: 10.2 FL (ref 6–12)
POTASSIUM SERPL-SCNC: 3.9 MMOL/L (ref 3.5–5.2)
PROT SERPL-MCNC: 6.1 G/DL (ref 6–8.5)
RBC # BLD AUTO: 4.14 10*6/MM3 (ref 4.14–5.8)
RBC MORPH BLD: NORMAL
SODIUM SERPL-SCNC: 140 MMOL/L (ref 136–145)
TIBC SERPL-MCNC: 224 MCG/DL (ref 298–536)
TRANSFERRIN SERPL-MCNC: 150 MG/DL (ref 200–360)
VIT B12 BLD-MCNC: 1022 PG/ML (ref 211–946)
WBC MORPH BLD: NORMAL
WBC NRBC COR # BLD: 1.31 10*3/MM3 (ref 3.4–10.8)

## 2021-12-08 PROCEDURE — 83540 ASSAY OF IRON: CPT | Performed by: PHYSICIAN ASSISTANT

## 2021-12-08 PROCEDURE — 84466 ASSAY OF TRANSFERRIN: CPT | Performed by: PHYSICIAN ASSISTANT

## 2021-12-08 PROCEDURE — 94799 UNLISTED PULMONARY SVC/PX: CPT

## 2021-12-08 PROCEDURE — 25010000002 MAGNESIUM SULFATE IN D5W 1G/100ML (PREMIX) 1-5 GM/100ML-% SOLUTION: Performed by: INTERNAL MEDICINE

## 2021-12-08 PROCEDURE — 82746 ASSAY OF FOLIC ACID SERUM: CPT | Performed by: PHYSICIAN ASSISTANT

## 2021-12-08 PROCEDURE — 25010000002 CEFEPIME PER 500 MG: Performed by: STUDENT IN AN ORGANIZED HEALTH CARE EDUCATION/TRAINING PROGRAM

## 2021-12-08 PROCEDURE — 84100 ASSAY OF PHOSPHORUS: CPT | Performed by: PHYSICIAN ASSISTANT

## 2021-12-08 PROCEDURE — 85007 BL SMEAR W/DIFF WBC COUNT: CPT | Performed by: INTERNAL MEDICINE

## 2021-12-08 PROCEDURE — 99233 SBSQ HOSP IP/OBS HIGH 50: CPT | Performed by: INTERNAL MEDICINE

## 2021-12-08 PROCEDURE — 25010000002 ENOXAPARIN PER 10 MG: Performed by: STUDENT IN AN ORGANIZED HEALTH CARE EDUCATION/TRAINING PROGRAM

## 2021-12-08 PROCEDURE — 25010000002 VANCOMYCIN 5 G RECONSTITUTED SOLUTION: Performed by: STUDENT IN AN ORGANIZED HEALTH CARE EDUCATION/TRAINING PROGRAM

## 2021-12-08 PROCEDURE — 85025 COMPLETE CBC W/AUTO DIFF WBC: CPT | Performed by: INTERNAL MEDICINE

## 2021-12-08 PROCEDURE — 80053 COMPREHEN METABOLIC PANEL: CPT | Performed by: INTERNAL MEDICINE

## 2021-12-08 PROCEDURE — 94761 N-INVAS EAR/PLS OXIMETRY MLT: CPT

## 2021-12-08 PROCEDURE — 97161 PT EVAL LOW COMPLEX 20 MIN: CPT

## 2021-12-08 PROCEDURE — 82607 VITAMIN B-12: CPT | Performed by: PHYSICIAN ASSISTANT

## 2021-12-08 PROCEDURE — 83735 ASSAY OF MAGNESIUM: CPT | Performed by: PHYSICIAN ASSISTANT

## 2021-12-08 RX ORDER — MAGNESIUM SULFATE 1 G/100ML
1 INJECTION INTRAVENOUS
Status: COMPLETED | OUTPATIENT
Start: 2021-12-08 | End: 2021-12-08

## 2021-12-08 RX ORDER — AMLODIPINE BESYLATE 5 MG/1
5 TABLET ORAL
Status: DISCONTINUED | OUTPATIENT
Start: 2021-12-08 | End: 2021-12-10 | Stop reason: HOSPADM

## 2021-12-08 RX ORDER — IPRATROPIUM BROMIDE AND ALBUTEROL SULFATE 2.5; .5 MG/3ML; MG/3ML
3 SOLUTION RESPIRATORY (INHALATION) EVERY 6 HOURS PRN
Status: DISCONTINUED | OUTPATIENT
Start: 2021-12-08 | End: 2021-12-10 | Stop reason: HOSPADM

## 2021-12-08 RX ORDER — MIRTAZAPINE 15 MG/1
15 TABLET, FILM COATED ORAL NIGHTLY
Status: DISCONTINUED | OUTPATIENT
Start: 2021-12-08 | End: 2021-12-10 | Stop reason: HOSPADM

## 2021-12-08 RX ADMIN — VANCOMYCIN HYDROCHLORIDE 1750 MG: 5 INJECTION, POWDER, LYOPHILIZED, FOR SOLUTION INTRAVENOUS at 04:23

## 2021-12-08 RX ADMIN — ARFORMOTEROL TARTRATE 15 MCG: 15 SOLUTION RESPIRATORY (INHALATION) at 07:00

## 2021-12-08 RX ADMIN — CEFEPIME 2 G: 1 INJECTION, SOLUTION INTRAVENOUS at 00:36

## 2021-12-08 RX ADMIN — BUSPIRONE HYDROCHLORIDE 15 MG: 15 TABLET ORAL at 06:28

## 2021-12-08 RX ADMIN — CEFEPIME 2 G: 1 INJECTION, SOLUTION INTRAVENOUS at 23:41

## 2021-12-08 RX ADMIN — BUDESONIDE 0.5 MG: 0.5 SUSPENSION RESPIRATORY (INHALATION) at 07:00

## 2021-12-08 RX ADMIN — IPRATROPIUM BROMIDE AND ALBUTEROL SULFATE 3 ML: .5; 2.5 SOLUTION RESPIRATORY (INHALATION) at 07:00

## 2021-12-08 RX ADMIN — CEFEPIME 2 G: 1 INJECTION, SOLUTION INTRAVENOUS at 08:21

## 2021-12-08 RX ADMIN — ENOXAPARIN SODIUM 40 MG: 40 INJECTION SUBCUTANEOUS at 08:20

## 2021-12-08 RX ADMIN — MAGNESIUM SULFATE HEPTAHYDRATE 1 G: 1 INJECTION, SOLUTION INTRAVENOUS at 14:54

## 2021-12-08 RX ADMIN — ACETAMINOPHEN 650 MG: 325 TABLET ORAL at 13:17

## 2021-12-08 RX ADMIN — AMLODIPINE BESYLATE 5 MG: 5 TABLET ORAL at 10:12

## 2021-12-08 RX ADMIN — BUPRENORPHINE AND NALOXONE 1 TABLET: 8; 2 TABLET SUBLINGUAL at 20:12

## 2021-12-08 RX ADMIN — BUSPIRONE HYDROCHLORIDE 15 MG: 15 TABLET ORAL at 21:16

## 2021-12-08 RX ADMIN — BUDESONIDE 0.5 MG: 0.5 SUSPENSION RESPIRATORY (INHALATION) at 20:39

## 2021-12-08 RX ADMIN — SODIUM CHLORIDE, PRESERVATIVE FREE 10 ML: 5 INJECTION INTRAVENOUS at 20:13

## 2021-12-08 RX ADMIN — BUPRENORPHINE AND NALOXONE 1 TABLET: 8; 2 TABLET SUBLINGUAL at 08:20

## 2021-12-08 RX ADMIN — SODIUM CHLORIDE, PRESERVATIVE FREE 10 ML: 5 INJECTION INTRAVENOUS at 08:21

## 2021-12-08 RX ADMIN — ARFORMOTEROL TARTRATE 15 MCG: 15 SOLUTION RESPIRATORY (INHALATION) at 20:39

## 2021-12-08 RX ADMIN — BUSPIRONE HYDROCHLORIDE 15 MG: 15 TABLET ORAL at 14:53

## 2021-12-08 RX ADMIN — IPRATROPIUM BROMIDE AND ALBUTEROL SULFATE 3 ML: .5; 2.5 SOLUTION RESPIRATORY (INHALATION) at 00:27

## 2021-12-08 RX ADMIN — CEFEPIME 2 G: 1 INJECTION, SOLUTION INTRAVENOUS at 17:12

## 2021-12-08 RX ADMIN — IBUPROFEN 400 MG: 400 TABLET, FILM COATED ORAL at 08:20

## 2021-12-08 RX ADMIN — MIRTAZAPINE 15 MG: 15 TABLET, FILM COATED ORAL at 20:12

## 2021-12-08 RX ADMIN — MAGNESIUM SULFATE HEPTAHYDRATE 1 G: 1 INJECTION, SOLUTION INTRAVENOUS at 14:55

## 2021-12-08 RX ADMIN — IBUPROFEN 400 MG: 400 TABLET, FILM COATED ORAL at 20:12

## 2021-12-08 RX ADMIN — IPRATROPIUM BROMIDE AND ALBUTEROL SULFATE 3 ML: .5; 2.5 SOLUTION RESPIRATORY (INHALATION) at 13:12

## 2021-12-08 RX ADMIN — ACETAMINOPHEN 650 MG: 325 TABLET ORAL at 20:12

## 2021-12-08 NOTE — PROGRESS NOTES
Pulmonary / Critical Care Progress Note      Patient Name: Josue Stern  : 1955  MRN: [id number]  Primary Care Physician:  Forrest Duong MD  Date of admission: 2021    Subjective   Subjective   Follow-up for hypoxia    Over past 24 hours, continues on antibiotics and nebulizers.    Overnight had a 8 beat run of VTach.    This morning,  Sitting up on side of bed   O2 at 2 L with O2 sats 93%  Feeling better  Improving dyspnea  Nonproductive cough  No chest pain  No fever or chills  Weak and fatigued  Complaints of not sleeping well    Review of Systems  General:  + Fatigue, No Fever  HEENT:  No Dysphagia, No Visual Changes  Respiratory:  + Cough, + Dyspnea, No Pleuritic Pain  Cardiovascular:  No Chest Pain, No Palpitations, No FERNANDEZ, No Chest Pressure  Gastrointestinal:  No Abdominal Pain, No Nausea, No Vomiting, No Diarrhea  Genitourinary:  No Dysuria, No Frequency, No Hesitancy  Musculoskeletal:  No Joint Tenderness, No Joint Stiffness, + weakness    Objective   Objective     Vitals:   Temp:  [97.5 °F (36.4 °C)-98.4 °F (36.9 °C)] 98.4 °F (36.9 °C)  Heart Rate:  [59-88] 65  Resp:  [18-20] 20  BP: (130-170)/(68-97) 163/97  Flow (L/min):  [2-5] 2    Physical Exam   Vital Signs Reviewed   General: WDWN male, Awake and Alert, NAD on 2 L NC    HEENT:  PERRL, EOMI.  OP, nares clear, no sinus tenderness  Neck:  Supple, no JVD, no thyromegaly  Chest:  good aeration, clear to auscultation bilaterally, tympanic to percussion bilaterally, no work of breathing noted  CV: RRR, no MGR, pulses 2+, equal  Abd:  Soft, NT, ND, + BS, no HSM  EXT:  no clubbing, no cyanosis, no edema  Neuro:  A&Ox3, CN grossly intact, no focal deficits  Skin: No rashes or lesions noted    Result Review    Result Review:  I have personally reviewed the results from the time of this admission to 2021 07:34 EST and agree with these findings:  [x]  Laboratory  [x]  Microbiology  [x]  Radiology  [x]  EKG/Telemetry   []   Cardiology/Vascular   []  Pathology  []  Old records  []  Other:  Most notable findings include:  WBC 1.31, Mg 1.8, K 3.9, Cr 0.79    Procal 0.29  DDimer 0.94  proBNP 714    Cultures negative.    Assessment/Plan   Assessment / Plan     Active Hospital Problems:  Active Hospital Problems    Diagnosis    • Neutropenic fever (HCC)      Impression:  Acute hypoxic respiratory failure requiring HFNC  Community acquired pneumonia of unspecified organism  Rule out sepsis  Neutropenia  HTN  Hx of Hepatitis C    Plan:  Continue to wean O2 to keep sats >90%.  MRSA PCR negative.  Will discontinue Vancomycin.  Infectious work-up negative to date.  Will continue Cefepime. De-escalate based off cultures.  WBC trending up, 0.64--> 1.31.   Echocardiogram with EF 65%.  Replace Magnesium IV.  Continue Pulmicort, Brovana, and Duonebs.  Continue bronchopulmonary hygiene.  Encourage IS and flutter valve.  Speech therapy--> no overt signs of aspiration.  Keep HOB elevated after eating.  Do not eat close to bedtime.  Will restart home Remeron for sleep.  Encourage activity.  Up to chair as tolerated.      DVT prophylaxis:  Medical DVT prophylaxis orders are present.    CODE STATUS:   Level Of Support Discussed With: Patient  Code Status (Patient has no pulse and is not breathing): CPR (Attempt to Resuscitate)  Medical Interventions (Patient has pulse or is breathing): Full Support    Labs, microbiology, radiology, medications, and provider notes personally reviewed.  Discussed with primary services and bedside RN.    Electronically signed by TRISHA Pascual, 12/08/21, 1:18 PM EST.  Electronically signed by Refugio Meredith MD, 12/08/21, 7:34 AM EST.

## 2021-12-08 NOTE — PLAN OF CARE
Goal Outcome Evaluation:   PT. HAD 8 BEAT RUN OF VTACH THIS SHIFT. WBC INCREASED TO 1.31 AND MD NOTIFIED. 02 DECREASED TO 2L N/C. VSS.

## 2021-12-08 NOTE — PLAN OF CARE
Goal Outcome Evaluation:  Plan of Care Reviewed With: patient        Progress: improving  Outcome Summary: Patient presents with no physical limitations that impede his ability to return home independently.  Patient encouraged to continue ambulating in his room as tolerated while here at the hospital.  Patient will be discharged from skilled physical therapy services at this time.

## 2021-12-08 NOTE — THERAPY EVALUATION
Acute Care - Physical Therapy Initial Evaluation   [place]     Patient Name: Josue Stern  : 1955  MRN: [id number]  Today's Date: 2021      Visit Dx:     ICD-10-CM ICD-9-CM   1. Neutropenic fever (HCC)  D70.9 288.00    R50.81 780.61   2. Acute respiratory failure with hypoxia (HCC)  J96.01 518.81   3. Oropharyngeal dysphagia  R13.12 787.22   4. Difficulty walking  R26.2 719.7     Patient Active Problem List   Diagnosis   • Neutropenic fever (HCC)     Past Medical History:   Diagnosis Date   • Asthma    • Hypertension      History reviewed. No pertinent surgical history.  PT Assessment (last 12 hours)     PT Evaluation and Treatment     Row Name 21 1600          Physical Therapy Time and Intention    Subjective Information no complaints  -CS     Document Type evaluation  -CS     Mode of Treatment individual therapy; physical therapy  -CS     Patient Effort good  -CS     Row Name 21 1600          General Information    Patient Profile Reviewed yes  -CS     Patient Observations alert; cooperative; agree to therapy  -CS     Prior Level of Function independent:; all household mobility; community mobility; gait; transfer; bed mobility; ADL's  -CS     Equipment Currently Used at Home none  -CS     Existing Precautions/Restrictions oxygen therapy device and L/min; fall  currently on 1-2L  -CS     Barriers to Rehab none identified  -CS     Row Name 21 1600          Living Environment    Current Living Arrangements home/apartment/condo  -CS     Lives With alone  -CS     Row Name 21 1600          Cognition    Orientation Status (Cognition) oriented x 3  -CS     Row Name 21 1600          Range of Motion Comprehensive    General Range of Motion bilateral lower extremity ROM WFL  -CS     Row Name 21 1600          Strength Comprehensive (MMT)    General Manual Muscle Testing (MMT) Assessment no strength deficits identified  -CS     Row Name 21 1600          Bed  Mobility    Bed Mobility bed mobility (all) activities  -CS     All Activities, [place] (Bed Mobility) independent  -CS     Row Name 12/08/21 1600          Transfers    Transfers sit-stand transfer; stand-sit transfer  -CS     Comment (Transfers) Patient demonstrated independence with all functional transfers.  -     Row Name 12/08/21 1600          Gait/Stairs (Locomotion)    Gait/Stairs Locomotion gait/ambulation independence  -CS     [place] Level (Gait) supervision  for longer distances at thist ema for safety  -CS     Assistive Device (Gait) --  no AD  -CS     Distance in Feet (Gait) 150  -     Row Name 12/08/21 1600          Safety Issues, Functional Mobility    Safety Issues Affecting Function (Mobility) judgment  -CS     Impairments Affecting Function (Mobility) endurance/activity tolerance  -     Row Name 12/08/21 1600          Balance    Balance Assessment standing dynamic balance  -     Dynamic Standing Balance WFL  -     Row Name 12/08/21 1600          Plan of Care Review    Plan of Care Reviewed With patient  -CS     Progress improving  -CS     Outcome Summary Patient presents with no physical limitations that impede his ability to return home independently.  Patient encouraged to continue ambulating in his room as tolerated while here at the hospital.  Patient will be discharged from skilled physical therapy services at this time.  -     Row Name 12/08/21 1600          Therapy Assessment/Plan (PT)    Criteria for Skilled Interventions Met (PT) no problems identified which require skilled intervention  -     Row Name 12/08/21 1600          PT Evaluation Complexity    History, PT Evaluation Complexity no personal factors and/or comorbidities  -CS     Examination of Body Systems (PT Eval Complexity) total of 4 or more elements  -CS     Clinical Presentation (PT Evaluation Complexity) stable  -CS     Clinical Decision Making (PT Evaluation Complexity) low complexity  -CS     Overall  Complexity (PT Evaluation Complexity) low complexity  -CS     Row Name 12/08/21 1600          Therapy Plan Review/Discharge Plan (PT)    Therapy Plan Review (PT) patient; evaluation/treatment results reviewed  -           User Key  (r) = Recorded By, (t) = Taken By, (c) = Cosigned By    Initials Name Provider Type    CS Felecia Hernandez PT Physical Therapist                Physical Therapy Education                 Title: PT OT SLP Therapies (In Progress)     Topic: Physical Therapy (In Progress)     Point: Mobility training (Done)     Learning Progress Summary           Patient Acceptance, E,TB, VU by  at 12/8/2021 1642                   Point: Home exercise program (Not Started)     Learner Progress:  Not documented in this visit.          Point: Body mechanics (Not Started)     Learner Progress:  Not documented in this visit.          Point: Precautions (Done)     Learning Progress Summary           Patient Acceptance, E,TB, VU by  at 12/8/2021 1642                               User Key     Initials Effective Dates Name Provider Type Discipline     04/25/21 -  Felecia Hernandez PT Physical Therapist PT              PT Recommendation and Plan  Anticipated Discharge Disposition (PT): home, home with assist  Therapy Frequency (PT): evaluation only  Plan of Care Reviewed With: patient  Progress: improving  Outcome Summary: Patient presents with no physical limitations that impede his ability to return home independently.  Patient encouraged to continue ambulating in his room as tolerated while here at the hospital.  Patient will be discharged from skilled physical therapy services at this time.   Outcome Measures     Row Name 12/08/21 1600             How much help from another person do you currently need...    Turning from your back to your side while in flat bed without using bedrails? 4  -CS      Moving from lying on back to sitting on the side of a flat bed without bedrails? 4  -CS      Moving to and  from a bed to a chair (including a wheelchair)? 4  -CS      Standing up from a chair using your arms (e.g., wheelchair, bedside chair)? 4  -CS      Climbing 3-5 steps with a railing? 3  -CS      To walk in hospital room? 4  -CS      AM-PAC 6 Clicks Score (PT) 23  -CS              Functional Assessment    Outcome Measure Options AM-PAC 6 Clicks Basic Mobility (PT)  -CS            User Key  (r) = Recorded By, (t) = Taken By, (c) = Cosigned By    Initials Name Provider Type    Felecia Cuba, PT Physical Therapist                 Time Calculation:    PT Charges     Row Name 12/08/21 1630             Time Calculation    PT Received On 12/08/21  -CS              Untimed Charges    PT Eval/Re-eval Minutes 46  -CS              Total Minutes    Untimed Charges Total Minutes 46  -CS       Total Minutes 46  -CS            User Key  (r) = Recorded By, (t) = Taken By, (c) = Cosigned By    Initials Name Provider Type    Felecia Cuba PT Physical Therapist              Therapy Charges for Today     Code Description Service Date Service Provider Modifiers Qty    [id number] HC PT EVAL LOW COMPLEXITY 4 12/8/2021 Felecia Hernandez, PT GP 1          PT G-Codes  Outcome Measure Options: AM-PAC 6 Clicks Basic Mobility (PT)  AM-PAC 6 Clicks Score (PT): 23    Felecia Hernandez, PT  12/8/2021

## 2021-12-08 NOTE — PROGRESS NOTES
[facility]   Hospitalist Progress Note  Date: 2021  Patient Name: Josue Stern  : 1955  MRN: [id number]  Date of admission: 2021      Subjective   Subjective     Chief Complaint: Weakness    Summary: 66-year-old male with hep C, substance abuse, COPD.  He presented to the ED complaining of generalized weakness.  He was being discharged but felt weak and on his way home driving felt like he was about to pass out.  In the ED he was febrile to 103.  Had neutropenia on labs.  Hematologist was contacted by the ED physician.  He was admitted to the medicine service.    Interval Followup: Patient awake alert oriented no acute distress.  Supplemental oxygen weaned to 2 L with O2 sats in the upper 90s we will continue to wean.  Blood pressures trending up resume Norvasc.  White blood cell count up to 1.3 continue to monitor.  Remains on IV vancomycin and cefepime with significant improvement suspect patient can discharge in the next 1 to 2 days on oral antibiotics.  Review of Systems   All systems were reviewed and negative except for: fatigue, body aches, chills, soa    Objective   Objective     Vitals:   Temp:  [97.5 °F (36.4 °C)-98.4 °F (36.9 °C)] 97.9 °F (36.6 °C)  Heart Rate:  [59-88] 65  Resp:  [18-20] 20  BP: (130-170)/(68-97) 168/95  Flow (L/min):  [2] 2  Physical Exam    Constitutional: Awake, alert, no acute distress   Eyes: Pupils equal, sclerae anicteric, no conjunctival injection   HENT: NCAT, mucous membranes moist   Neck: Supple, no thyromegaly, no lymphadenopathy, trachea midline   Respiratory: clear but diminished;    Cardiovascular: RRR, no murmurs, rubs, or gallops, palpable pedal pulses bilaterally   Gastrointestinal: Positive bowel sounds, soft, nontender, nondistended   Musculoskeletal: No bilateral ankle edema, no clubbing or cyanosis to extremities   Psychiatric: Appropriate affect, cooperative   Neurologic: Oriented x 3, strength symmetric in all extremities, Cranial  Nerves grossly intact to confrontation, speech clear   Skin: No rashes     Result Review    Result Review:  I have personally reviewed the results from the time of this admission to 12/8/2021 11:55 EST and agree with these findings:  [x]  Laboratory  [x]  Microbiology  [x]  Radiology  []  EKG/Telemetry   []  Cardiology/Vascular   []  Pathology  []  Old records  []  Other:    Assessment/Plan   Assessment / Plan     Assessment:  • Neutropenic fever  • Acute hypoxic respiratory failure  • Hepatitis C  • History of substance abuse per chart  • HTN  • COPD    Plan:  · Continue vancomycin and cefepime pending final culture results can transition to appropriate oral antibiotics on discharge  · Wean oxygen as tolerated  · Resume Norvasc  · Continue nebulizer treatments  · Leukocyte count increasing continue to monitor  · Infectious work-up negative to date  · Pulmonary consultation appreciated  · Discussed with RN discussed with pulmonary  · Hopefully home in the next 1 to 2 days on appropriate oral antibiotics      DVT prophylaxis:  Medical DVT prophylaxis orders are present.    CODE STATUS:   Level Of Support Discussed With: Patient  Code Status (Patient has no pulse and is not breathing): CPR (Attempt to Resuscitate)  Medical Interventions (Patient has pulse or is breathing): Full Support      Electronically signed by ELIDA Bell, 12/08/21, 11:58 AM EST.      Attending Note:  I have seen and examined the patient and agree with the above information from Chuy Rowland PA-C and have outlined plan of care.  Pleasant 66-year-old male presented with fever and weakness.  Found to have neutropenia.  Imaging suggest pneumonia.  I ordered Covid swab and flu swab yesterday, orders were acknowledged but never carried out.  Those were done today and fortunately negative.  Additionally, HIV was negative.  WBC count better after Neupogen.  We will recheck tomorrow.  2 L today.  Failed walk test, will need home O2.  Probably  can discharge in 1 to 2 days if white blood cell count better.  Will need oral antibiotics.  On exam he looks good, chest clear, no distress.  Can resume his Neurontin which he has been on for some time.  Electronically signed by Ronal Fischer MD, 12/08/21, 5:12 PM EST.

## 2021-12-09 LAB
ALBUMIN SERPL-MCNC: 3.3 G/DL (ref 3.5–5.2)
ANION GAP SERPL CALCULATED.3IONS-SCNC: 9.3 MMOL/L (ref 5–15)
BASOPHILS # BLD AUTO: 0.05 10*3/MM3 (ref 0–0.2)
BASOPHILS NFR BLD AUTO: 3.1 % (ref 0–1.5)
BUN SERPL-MCNC: 6 MG/DL (ref 8–23)
BUN/CREAT SERPL: 8.1 (ref 7–25)
CALCIUM SPEC-SCNC: 9 MG/DL (ref 8.6–10.5)
CHLORIDE SERPL-SCNC: 105 MMOL/L (ref 98–107)
CO2 SERPL-SCNC: 25.7 MMOL/L (ref 22–29)
CREAT SERPL-MCNC: 0.74 MG/DL (ref 0.76–1.27)
DEPRECATED RDW RBC AUTO: 38.8 FL (ref 37–54)
EOSINOPHIL # BLD AUTO: 0.14 10*3/MM3 (ref 0–0.4)
EOSINOPHIL NFR BLD AUTO: 8.6 % (ref 0.3–6.2)
ERYTHROCYTE [DISTWIDTH] IN BLOOD BY AUTOMATED COUNT: 13.1 % (ref 12.3–15.4)
GFR SERPL CREATININE-BSD FRML MDRD: 128 ML/MIN/1.73
GLUCOSE SERPL-MCNC: 111 MG/DL (ref 65–99)
HCT VFR BLD AUTO: 32.9 % (ref 37.5–51)
HGB BLD-MCNC: 11.1 G/DL (ref 13–17.7)
IMM GRANULOCYTES # BLD AUTO: 0 10*3/MM3 (ref 0–0.05)
IMM GRANULOCYTES NFR BLD AUTO: 0 % (ref 0–0.5)
LYMPHOCYTES # BLD AUTO: 0.73 10*3/MM3 (ref 0.7–3.1)
LYMPHOCYTES NFR BLD AUTO: 44.8 % (ref 19.6–45.3)
MAGNESIUM SERPL-MCNC: 1.8 MG/DL (ref 1.6–2.4)
MCH RBC QN AUTO: 27.5 PG (ref 26.6–33)
MCHC RBC AUTO-ENTMCNC: 33.7 G/DL (ref 31.5–35.7)
MCV RBC AUTO: 81.4 FL (ref 79–97)
MONOCYTES # BLD AUTO: 0.09 10*3/MM3 (ref 0.1–0.9)
MONOCYTES NFR BLD AUTO: 5.5 % (ref 5–12)
NEUTROPHILS NFR BLD AUTO: 0.62 10*3/MM3 (ref 1.7–7)
NEUTROPHILS NFR BLD AUTO: 38 % (ref 42.7–76)
NRBC BLD AUTO-RTO: 0 /100 WBC (ref 0–0.2)
PHOSPHATE SERPL-MCNC: 2.7 MG/DL (ref 2.5–4.5)
PLAT MORPH BLD: NORMAL
PLATELET # BLD AUTO: 265 10*3/MM3 (ref 140–450)
PMV BLD AUTO: 10.4 FL (ref 6–12)
POTASSIUM SERPL-SCNC: 3.5 MMOL/L (ref 3.5–5.2)
RBC # BLD AUTO: 4.04 10*6/MM3 (ref 4.14–5.8)
RBC MORPH BLD: NORMAL
SODIUM SERPL-SCNC: 140 MMOL/L (ref 136–145)
WBC MORPH BLD: NORMAL
WBC NRBC COR # BLD: 1.63 10*3/MM3 (ref 3.4–10.8)

## 2021-12-09 PROCEDURE — 80069 RENAL FUNCTION PANEL: CPT | Performed by: PHYSICIAN ASSISTANT

## 2021-12-09 PROCEDURE — 94799 UNLISTED PULMONARY SVC/PX: CPT

## 2021-12-09 PROCEDURE — 99232 SBSQ HOSP IP/OBS MODERATE 35: CPT | Performed by: INTERNAL MEDICINE

## 2021-12-09 PROCEDURE — 94640 AIRWAY INHALATION TREATMENT: CPT

## 2021-12-09 PROCEDURE — 25010000002 CEFEPIME PER 500 MG: Performed by: STUDENT IN AN ORGANIZED HEALTH CARE EDUCATION/TRAINING PROGRAM

## 2021-12-09 PROCEDURE — 85025 COMPLETE CBC W/AUTO DIFF WBC: CPT | Performed by: PHYSICIAN ASSISTANT

## 2021-12-09 PROCEDURE — 25010000002 ENOXAPARIN PER 10 MG: Performed by: STUDENT IN AN ORGANIZED HEALTH CARE EDUCATION/TRAINING PROGRAM

## 2021-12-09 PROCEDURE — 97165 OT EVAL LOW COMPLEX 30 MIN: CPT

## 2021-12-09 PROCEDURE — 85007 BL SMEAR W/DIFF WBC COUNT: CPT | Performed by: PHYSICIAN ASSISTANT

## 2021-12-09 PROCEDURE — 83735 ASSAY OF MAGNESIUM: CPT | Performed by: PHYSICIAN ASSISTANT

## 2021-12-09 RX ORDER — GABAPENTIN 400 MG/1
400 CAPSULE ORAL 3 TIMES DAILY
Status: DISCONTINUED | OUTPATIENT
Start: 2021-12-09 | End: 2021-12-10 | Stop reason: HOSPADM

## 2021-12-09 RX ORDER — BUDESONIDE AND FORMOTEROL FUMARATE DIHYDRATE 160; 4.5 UG/1; UG/1
2 AEROSOL RESPIRATORY (INHALATION)
Status: DISCONTINUED | OUTPATIENT
Start: 2021-12-09 | End: 2021-12-10 | Stop reason: HOSPADM

## 2021-12-09 RX ORDER — TERAZOSIN 1 MG/1
1 CAPSULE ORAL NIGHTLY
Status: DISCONTINUED | OUTPATIENT
Start: 2021-12-09 | End: 2021-12-10 | Stop reason: HOSPADM

## 2021-12-09 RX ADMIN — BUSPIRONE HYDROCHLORIDE 15 MG: 15 TABLET ORAL at 20:46

## 2021-12-09 RX ADMIN — BUPRENORPHINE AND NALOXONE 1 TABLET: 8; 2 TABLET SUBLINGUAL at 09:38

## 2021-12-09 RX ADMIN — GABAPENTIN 400 MG: 400 CAPSULE ORAL at 09:38

## 2021-12-09 RX ADMIN — AMLODIPINE BESYLATE 5 MG: 5 TABLET ORAL at 09:38

## 2021-12-09 RX ADMIN — BUDESONIDE AND FORMOTEROL FUMARATE DIHYDRATE 2 PUFF: 160; 4.5 AEROSOL RESPIRATORY (INHALATION) at 22:04

## 2021-12-09 RX ADMIN — MIRTAZAPINE 15 MG: 15 TABLET, FILM COATED ORAL at 20:46

## 2021-12-09 RX ADMIN — GABAPENTIN 400 MG: 400 CAPSULE ORAL at 16:07

## 2021-12-09 RX ADMIN — GABAPENTIN 400 MG: 400 CAPSULE ORAL at 20:46

## 2021-12-09 RX ADMIN — BUPRENORPHINE AND NALOXONE 1 TABLET: 8; 2 TABLET SUBLINGUAL at 20:46

## 2021-12-09 RX ADMIN — IBUPROFEN 400 MG: 400 TABLET, FILM COATED ORAL at 16:07

## 2021-12-09 RX ADMIN — BUSPIRONE HYDROCHLORIDE 15 MG: 15 TABLET ORAL at 14:30

## 2021-12-09 RX ADMIN — CEFEPIME 2 G: 1 INJECTION, SOLUTION INTRAVENOUS at 16:07

## 2021-12-09 RX ADMIN — ARFORMOTEROL TARTRATE 15 MCG: 15 SOLUTION RESPIRATORY (INHALATION) at 07:34

## 2021-12-09 RX ADMIN — SODIUM CHLORIDE, PRESERVATIVE FREE 10 ML: 5 INJECTION INTRAVENOUS at 09:38

## 2021-12-09 RX ADMIN — BUSPIRONE HYDROCHLORIDE 15 MG: 15 TABLET ORAL at 05:09

## 2021-12-09 RX ADMIN — BUDESONIDE 0.5 MG: 0.5 SUSPENSION RESPIRATORY (INHALATION) at 07:34

## 2021-12-09 RX ADMIN — CEFEPIME 2 G: 1 INJECTION, SOLUTION INTRAVENOUS at 09:38

## 2021-12-09 RX ADMIN — TERAZOSIN HYDROCHLORIDE 1 MG: 1 CAPSULE ORAL at 20:46

## 2021-12-09 RX ADMIN — ENOXAPARIN SODIUM 40 MG: 40 INJECTION SUBCUTANEOUS at 09:38

## 2021-12-09 NOTE — PLAN OF CARE
Goal Outcome Evaluation:  Plan of Care Reviewed With: patient        Progress: improving  Outcome Summary: Skilled OT services are not indicated at this time as patient has had no significant decline in functional status.  DC OT with patient in agreement.

## 2021-12-09 NOTE — SIGNIFICANT NOTE
12/09/21 1110   Coping/Psychosocial   Observed Emotional State calm; cooperative   Verbalized Emotional State hopefulness; acceptance   Trust Relationship/Rapport empathic listening provided   Involvement in Care interacting with patient   Additional Documentation Spiritual Care (Group)   Spiritual Care   Use of Spiritual Resources spirituality for coping, indicated strong use of   Spiritual Care Source  initiative   Spiritual Care Follow-Up follow-up, none required as presently assessed   Response to Spiritual Care receptive of support; thanks expressed   Spiritual Care Interventions supportive conversation provided   Spiritual Care Visit Type initial   Receptivity to Spiritual Care visit welcomed

## 2021-12-09 NOTE — PROGRESS NOTES
Pulmonary / Critical Care Progress Note      Patient Name: Josue Stern  : 1955  MRN: [id number]  Primary Care Physician:  Forrest Duong MD  Date of admission: 2021    Subjective   Subjective   Follow-up for hypoxia    Over past 24 hours, continues on antibiotics and nebulizers.    No acute events overnight.    This morning,  Lying in bed on 2 L NC  Feeling better  Improving dyspnea  Nonproductive cough  No chest pain  No fever or chills  Weak and fatigued    Review of Systems  General:  + Fatigue, No Fever  HEENT:  No Dysphagia, No Visual Changes  Respiratory:  + Cough, + Dyspnea, No Pleuritic Pain  Cardiovascular:  No Chest Pain, No Palpitations, No FERNANDEZ, No Chest Pressure  Gastrointestinal:  No Abdominal Pain, No Nausea, No Vomiting, No Diarrhea  Genitourinary:  No Dysuria, No Frequency, No Hesitancy  Musculoskeletal:  No Joint Tenderness, No Joint Stiffness, + weakness    Objective   Objective     Vitals:   Temp:  [97.5 °F (36.4 °C)-98.2 °F (36.8 °C)] 97.7 °F (36.5 °C)  Heart Rate:  [61-70] 61  Resp:  [18-20] 18  BP: (158-174)/(83-97) 158/94  Flow (L/min):  [2] 2    Physical Exam   Vital Signs Reviewed   General: WDWN male, Awake and Alert, NAD on 2 L NC    HEENT:  PERRL, EOMI.  OP, nares clear, no sinus tenderness  Neck:  Supple, no JVD, no thyromegaly  Chest:  good aeration, clear to auscultation bilaterally, tympanic to percussion bilaterally, no work of breathing noted  CV: NSR 91, no MGR, pulses 2+, equal  Abd:  Soft, NT, ND, + BS, no HSM  EXT:  no clubbing, no cyanosis, no edema  Neuro:  A&Ox3, CN grossly intact, no focal deficits  Skin: No rashes or lesions noted    Result Review    Result Review:  I have personally reviewed the results from the time of this admission to 2021 07:24 EST and agree with these findings:  [x]  Laboratory  [x]  Microbiology  [x]  Radiology  [x]  EKG/Telemetry   []  Cardiology/Vascular   []  Pathology  []  Old records  []  Other:  Most notable  findings include:  WBC 1.63, Mg 1.8, K 3.5, Cr 0.74    Cultures negative    Assessment/Plan   Assessment / Plan     Active Hospital Problems:  Active Hospital Problems    Diagnosis    • Neutropenic fever (HCC)      Impression:  Acute hypoxic respiratory failure requiring HFNC  Community acquired pneumonia of unspecified organism  Rule out sepsis  Neutropenia  HTN  Hx of Hepatitis C    Plan:  Continue to wean O2 to keep sats >90%.  Will need 6MWT prior to discharge.  Infectious work-up negative to date.  Will continue Cefepime. Would recommend transitioning to Cefdnir at discharge to complete 10 day course.  WBC continues to trend up.  Continue Pulmicort, Brovana, and Duonebs.  Continue bronchopulmonary hygiene.  Encourage IS and flutter valve.  Speech therapy--> no overt signs of aspiration.  Keep HOB elevated after eating.  Do not eat close to bedtime.  Continue home Remeron for sleep.  Encourage activity.  Up to chair as tolerated.    Ok from pulmonary standpoint to discharge home with antibiotics.  Follow up in our clinic in 2 weeks.      DVT prophylaxis:  Medical DVT prophylaxis orders are present.    CODE STATUS:   Level Of Support Discussed With: Patient  Code Status (Patient has no pulse and is not breathing): CPR (Attempt to Resuscitate)  Medical Interventions (Patient has pulse or is breathing): Full Support    Labs, microbiology, radiology, medications, and provider notes personally reviewed.  Discussed with primary services and bedside RN.    Electronically signed by TRISHA Pascual, 12/09/21, 12:41 PM EST.  Electronically signed by Refugio Meredith MD, 12/09/21, 7:24 AM EST.

## 2021-12-09 NOTE — THERAPY EVALUATION
Patient Name: Josue Stern  : 1955    MRN: [id number]                              Today's Date: 2021       Admit Date: 2021    Visit Dx:     ICD-10-CM ICD-9-CM   1. Neutropenic fever (HCC)  D70.9 288.00    R50.81 780.61   2. Acute respiratory failure with hypoxia (HCC)  J96.01 518.81   3. Oropharyngeal dysphagia  R13.12 787.22   4. Difficulty walking  R26.2 719.7   5. Decreased activities of daily living (ADL)  Z78.9 V49.89     Patient Active Problem List   Diagnosis   • Neutropenic fever (HCC)     Past Medical History:   Diagnosis Date   • Asthma    • Hypertension      History reviewed. No pertinent surgical history.   General Information     Row Name 21 1309          OT Time and Intention    Document Type evaluation  -AV     Mode of Treatment individual therapy; occupational therapy  -AV     Row Name 21 130          General Information    Patient Profile Reviewed yes  -AV     Prior Level of Function independent:; ADL's; home management; all household mobility  Stands to shower/tub with grab bar.  Stands at sink to groom.  Regular commode.  Ambulates without assistive device.  No home oxygen.  -AV     Existing Precautions/Restrictions fall; oxygen therapy device and L/min  -AV     Barriers to Rehab none identified  -AV     Row Name 21 1309          Occupational Profile    Reason for Services/Referral (Occupational Profile) Patient is a 66 year old male admitted to [facility] on 2021 with weakness.  He is currently on fourth floor/room air.   OT consulted to evaluate for ADL needs.  No previous OT services for current condition.  -AV     Row Name 21 1309          Living Environment    Lives With alone  -AV     Row Name 21 1309          Home Main Entrance    Number of Stairs, Main Entrance seven  With rail  -AV     Row Name 21 130          Cognition    Orientation Status (Cognition) --  Patient is alert, pleasant and cooperative-  able to retain information and follow commands.  -AV     Row Name 12/09/21 1309          Safety Issues, Functional Mobility    Impairments Affecting Function (Mobility) --  NA  -AV           User Key  (r) = Recorded By, (t) = Taken By, (c) = Cosigned By    Initials Name Provider Type    Migue Thorpe OT Occupational Therapist                 Mobility/ADL's     Row Name 12/09/21 1310          Transfers    Comment (Transfers) Independent  -AV     Row Name 12/09/21 1310          Functional Mobility    Functional Mobility- Ind. Level independent  -AV     Row Name 12/09/21 1310          Activities of Daily Living    BADL Assessment/Intervention --  Patient is independent with all basic ADLs at this time and reports no difficulty with task completion.  -AV           User Key  (r) = Recorded By, (t) = Taken By, (c) = Cosigned By    Initials Name Provider Type    Migue Thorpe OT Occupational Therapist               Obj/Interventions     Row Name 12/09/21 1311          Sensory Assessment (Somatosensory)    Sensory Assessment (Somatosensory) UE sensation intact  -AV     Row Name 12/09/21 1311          Vision Assessment/Intervention    Visual Impairment/Limitations WFL; corrective lenses full-time  -AV     Row Name 12/09/21 1311          Range of Motion Comprehensive    General Range of Motion bilateral upper extremity ROM WFL  -AV     Row Name 12/09/21 1311          Strength Comprehensive (MMT)    Comment, General Manual Muscle Testing (MMT) Assessment Upper extremities equal at 5/5  -AV     Row Name 12/09/21 1311          Motor Skills    Motor Skills coordination  -AV     Coordination WFL  Right dominant  -AV     Row Name 12/09/21 1311          Balance    Balance Assessment standing dynamic balance  -AV     Dynamic Standing Balance WFL  -AV           User Key  (r) = Recorded By, (t) = Taken By, (c) = Cosigned By    Initials Name Provider Type    Migue Thorpe OT Occupational Therapist               Goals/Plan     No documentation.                Clinical Impression     Row Name 12/09/21 1311          Pain Assessment    Additional Documentation Pain Scale: Numbers Pre/Post-Treatment (Group)  -AV     Row Name 12/09/21 1311          Pain Scale: Numbers Pre/Post-Treatment    Pretreatment Pain Rating 0/10 - no pain  -AV     Posttreatment Pain Rating 0/10 - no pain  -AV     Row Name 12/09/21 1311          Plan of Care Review    Plan of Care Reviewed With patient  -AV     Progress improving  -AV     Outcome Summary Skilled OT services are not indicated at this time as patient has had no significant decline in functional status.  DC OT with patient in agreement.  -AV     Row Name 12/09/21 1311          Therapy Assessment/Plan (OT)    Patient/Family Therapy Goal Statement (OT) Discharge home soon  -AV     Rehab Potential (OT) good, to achieve stated therapy goals  -AV     Criteria for Skilled Therapeutic Interventions Met (OT) yes; meets criteria; skilled treatment is necessary  -AV     Therapy Frequency (OT) 5 times/wk  -AV     Row Name 12/09/21 1311          Therapy Plan Review/Discharge Plan (OT)    Anticipated Discharge Disposition (OT) home  -AV     Row Name 12/09/21 1311          Vital Signs    O2 Delivery Pre Treatment room air  -AV     O2 Delivery Intra Treatment room air  -AV     O2 Delivery Post Treatment room air  -AV           User Key  (r) = Recorded By, (t) = Taken By, (c) = Cosigned By    Initials Name Provider Type    Migue Thorpe, JAKE Occupational Therapist               Outcome Measures     Row Name 12/09/21 1312          How much help from another is currently needed...    Putting on and taking off regular lower body clothing? 4  -AV     Bathing (including washing, rinsing, and drying) 4  -AV     Toileting (which includes using toilet bed pan or urinal) 4  -AV     Putting on and taking off regular upper body clothing 4  -AV     Taking care of personal grooming (such as brushing teeth) 4  -AV     Eating meals  4  -AV     AM-PAC 6 Clicks Score (OT) 24  -AV     Row Name 12/09/21 1312          Functional Assessment    Outcome Measure Options AM-PAC 6 Clicks Daily Activity (OT); Optimal Instrument  -AV     Row Name 12/09/21 1312          Optimal Instrument    Optimal Instrument Optimal - 3  -AV     Bending/Stooping 1  -AV     Standing 1  -AV     Reaching 1  -AV     From the list, choose the 3 activities you would most like to be able to do without any difficulty Bending/stooping; Standing; Reaching  -AV     Total Score Optimal - 3 3  -AV           User Key  (r) = Recorded By, (t) = Taken By, (c) = Cosigned By    Initials Name Provider Type    iMgue Thorpe OT Occupational Therapist                Occupational Therapy Education                 Title: PT OT SLP Therapies (In Progress)     Topic: Occupational Therapy (Done)     Point: ADL training (Done)     Description:   Instruct learner(s) on proper safety adaptation and remediation techniques during self care or transfers.   Instruct in proper use of assistive devices.              Learning Progress Summary           Patient Acceptance, E, VU by AV at 12/9/2021 1313    Comment: No need for skilled OT services identified at this time                   Point: Home exercise program (Done)     Description:   Instruct learner(s) on appropriate technique for monitoring, assisting and/or progressing therapeutic exercises/activities.              Learning Progress Summary           Patient Acceptance, E, VU by AV at 12/9/2021 1313    Comment: No need for skilled OT services identified at this time                   Point: Precautions (Done)     Description:   Instruct learner(s) on prescribed precautions during self-care and functional transfers.              Learning Progress Summary           Patient Acceptance, E, VU by AV at 12/9/2021 1313    Comment: No need for skilled OT services identified at this time                   Point: Body mechanics (Done)     Description:    Instruct learner(s) on proper positioning and spine alignment during self-care, functional mobility activities and/or exercises.              Learning Progress Summary           Patient Acceptance, E, VU by AV at 12/9/2021 1313    Comment: No need for skilled OT services identified at this time                               User Key     Initials Effective Dates Name Provider Type Discipline     06/16/21 -  Migue Gary OT Occupational Therapist OT              OT Recommendation and Plan  Therapy Frequency (OT): 5 times/wk  Plan of Care Review  Plan of Care Reviewed With: patient  Progress: improving  Outcome Summary: Skilled OT services are not indicated at this time as patient has had no significant decline in functional status.  DC OT with patient in agreement.     Time Calculation:    Time Calculation- OT     Row Name 12/09/21 1314             Time Calculation- OT    OT Received On 12/09/21  -AV              Untimed Charges    OT Eval/Re-eval Minutes 35  -AV              Total Minutes    Untimed Charges Total Minutes 35  -AV       Total Minutes 35  -AV            User Key  (r) = Recorded By, (t) = Taken By, (c) = Cosigned By    Initials Name Provider Type    AV Migue Gary OT Occupational Therapist              Therapy Charges for Today     Code Description Service Date Service Provider Modifiers Qty    [id number] HC OT EVAL LOW COMPLEXITY 3 12/9/2021 Migue Gary OT GO 1               Migue Gary OT  12/9/2021

## 2021-12-09 NOTE — PLAN OF CARE
Goal Outcome Evaluation:         Pt no c/o pain this shift, continue with IV antibiotic, no adverse reaction to medication noted. Pt VS stable this shift. Pt no concern this moment.

## 2021-12-09 NOTE — PROGRESS NOTES
[facility]   Hospitalist Progress Note  Date: 2021  Patient Name: Josue Stern  : 1955  MRN: [id number]  Date of admission: 2021      Subjective   Subjective     Chief Complaint: Weakness    Summary: 66-year-old male with hep C, substance abuse, COPD.  He presented to the ED complaining of generalized weakness.  He was being discharged but felt weak and on his way home driving felt like he was about to pass out.  In the ED he was febrile to 103.  Had neutropenia on labs.  Hematologist was contacted by the ED physician.  He was admitted to the medicine service.    Interval Followup: Patient seen and examined doing well today denies chest pains or shortness of air walking the halls.  Feels much better since getting a sleeping pill back last night asking for his Neurontin.  White count improving suspect can discharge later today or tomorrow.    Review of Systems   All systems were reviewed and negative except for: fatigue, body aches, chills, soa    Objective   Objective     Vitals:   Temp:  [97.5 °F (36.4 °C)-98.2 °F (36.8 °C)] 97.6 °F (36.4 °C)  Heart Rate:  [61-71] 71  Resp:  [18-20] 18  BP: (155-174)/(83-97) 155/87  Flow (L/min):  [2] 2  Physical Exam    Constitutional: Awake, alert, no acute distress   Eyes: Pupils equal, sclerae anicteric, no conjunctival injection   HENT: NCAT, mucous membranes moist   Neck: Supple, no thyromegaly, no lymphadenopathy, trachea midline   Respiratory: clear but diminished;    Cardiovascular: RRR, no murmurs, rubs, or gallops, palpable pedal pulses bilaterally   Gastrointestinal: Positive bowel sounds, soft, nontender, nondistended   Musculoskeletal: No bilateral ankle edema, no clubbing or cyanosis to extremities   Psychiatric: Appropriate affect, cooperative   Neurologic: Oriented x 3, strength symmetric in all extremities, Cranial Nerves grossly intact to confrontation, speech clear   Skin: No rashes     Result Review    Result Review:  I have  personally reviewed the results from the time of this admission to 12/9/2021 12:41 EST and agree with these findings:  [x]  Laboratory  [x]  Microbiology  [x]  Radiology  []  EKG/Telemetry   []  Cardiology/Vascular   []  Pathology  []  Old records  []  Other:    Assessment/Plan   Assessment / Plan     Assessment:  • Neutropenic fever  • Pneumonia  • Acute hypoxic respiratory failure  • Hepatitis C  • History of substance abuse per chart  • HTN  • COPD    Plan:  · Continue antibiotics will transition to cefdinir to complete total of 10 days at discharge  · Wean oxygen 6-minute walk to assess home oxygen needs  · Resume Neurontin  · Resumed Norvasc will resume Minipress today  · Continue nebulizer treatments  · Leukocyte count increasing continue to monitor  · Pulmonary consultation appreciated  · Discussed with RN discussed with pulmonary  · Likely home later today or tomorrow  · Discussed with RN discussed with pulmonary      DVT prophylaxis:  Medical DVT prophylaxis orders are present.    CODE STATUS:   Level Of Support Discussed With: Patient  Code Status (Patient has no pulse and is not breathing): CPR (Attempt to Resuscitate)  Medical Interventions (Patient has pulse or is breathing): Full Support      Electronically signed by ELIDA Bell, 12/09/21, 12:45 PM EST.  .      Attending Note:  I have seen and examined the patient and agree with the above information from Chuy Rowland PA-C and have outlined plan of care.  Pleasant 66-year-old male presented with fever and weakness.  Found to have neutropenia.  Imaging suggest pneumonia.  I ordered Covid swab and flu swab yesterday, orders were acknowledged but never carried out.  Those were done today and fortunately negative.  Additionally, HIV was negative.  WBC count better after Neupogen.  Better today but still less than 2.  Likely can go home tomorrow.  NAD on exam.  Chest clear.  Electronically signed by Ronal Fischer MD, 12/09/21, 4:33 PM EST.

## 2021-12-10 VITALS
HEIGHT: 66 IN | OXYGEN SATURATION: 91 % | RESPIRATION RATE: 18 BRPM | HEART RATE: 73 BPM | DIASTOLIC BLOOD PRESSURE: 95 MMHG | BODY MASS INDEX: 29.12 KG/M2 | SYSTOLIC BLOOD PRESSURE: 159 MMHG | WEIGHT: 181.22 LBS | TEMPERATURE: 98.4 F

## 2021-12-10 LAB
ALBUMIN SERPL-MCNC: 3.1 G/DL (ref 3.5–5.2)
ANION GAP SERPL CALCULATED.3IONS-SCNC: 11 MMOL/L (ref 5–15)
BASOPHILS # BLD AUTO: 0.05 10*3/MM3 (ref 0–0.2)
BASOPHILS NFR BLD AUTO: 2.3 % (ref 0–1.5)
BUN SERPL-MCNC: 8 MG/DL (ref 8–23)
BUN/CREAT SERPL: 9.6 (ref 7–25)
CALCIUM SPEC-SCNC: 9 MG/DL (ref 8.6–10.5)
CHLORIDE SERPL-SCNC: 106 MMOL/L (ref 98–107)
CO2 SERPL-SCNC: 23 MMOL/L (ref 22–29)
CREAT SERPL-MCNC: 0.83 MG/DL (ref 0.76–1.27)
DEPRECATED RDW RBC AUTO: 41.7 FL (ref 37–54)
EOSINOPHIL # BLD AUTO: 0.16 10*3/MM3 (ref 0–0.4)
EOSINOPHIL NFR BLD AUTO: 7.3 % (ref 0.3–6.2)
ERYTHROCYTE [DISTWIDTH] IN BLOOD BY AUTOMATED COUNT: 13.4 % (ref 12.3–15.4)
GFR SERPL CREATININE-BSD FRML MDRD: 112 ML/MIN/1.73
GIANT PLATELETS: NORMAL
GLUCOSE SERPL-MCNC: 125 MG/DL (ref 65–99)
HCT VFR BLD AUTO: 35.3 % (ref 37.5–51)
HGB BLD-MCNC: 11.6 G/DL (ref 13–17.7)
IMM GRANULOCYTES # BLD AUTO: 0.01 10*3/MM3 (ref 0–0.05)
IMM GRANULOCYTES NFR BLD AUTO: 0.5 % (ref 0–0.5)
LARGE PLATELETS: NORMAL
LYMPHOCYTES # BLD AUTO: 0.78 10*3/MM3 (ref 0.7–3.1)
LYMPHOCYTES NFR BLD AUTO: 35.5 % (ref 19.6–45.3)
MAGNESIUM SERPL-MCNC: 1.7 MG/DL (ref 1.6–2.4)
MCH RBC QN AUTO: 28 PG (ref 26.6–33)
MCHC RBC AUTO-ENTMCNC: 32.9 G/DL (ref 31.5–35.7)
MCV RBC AUTO: 85.1 FL (ref 79–97)
MONOCYTES # BLD AUTO: 0.1 10*3/MM3 (ref 0.1–0.9)
MONOCYTES NFR BLD AUTO: 4.5 % (ref 5–12)
NEUTROPHILS NFR BLD AUTO: 1.1 10*3/MM3 (ref 1.7–7)
NEUTROPHILS NFR BLD AUTO: 49.9 % (ref 42.7–76)
NRBC BLD AUTO-RTO: 0 /100 WBC (ref 0–0.2)
PHOSPHATE SERPL-MCNC: 3.4 MG/DL (ref 2.5–4.5)
PLATELET # BLD AUTO: 277 10*3/MM3 (ref 140–450)
PMV BLD AUTO: 10.8 FL (ref 6–12)
POTASSIUM SERPL-SCNC: 3.7 MMOL/L (ref 3.5–5.2)
RBC # BLD AUTO: 4.15 10*6/MM3 (ref 4.14–5.8)
RBC MORPH BLD: NORMAL
SODIUM SERPL-SCNC: 140 MMOL/L (ref 136–145)
WBC MORPH BLD: NORMAL
WBC NRBC COR # BLD: 2.2 10*3/MM3 (ref 3.4–10.8)

## 2021-12-10 PROCEDURE — 99239 HOSP IP/OBS DSCHRG MGMT >30: CPT | Performed by: INTERNAL MEDICINE

## 2021-12-10 PROCEDURE — 85025 COMPLETE CBC W/AUTO DIFF WBC: CPT | Performed by: PHYSICIAN ASSISTANT

## 2021-12-10 PROCEDURE — 94799 UNLISTED PULMONARY SVC/PX: CPT

## 2021-12-10 PROCEDURE — 83735 ASSAY OF MAGNESIUM: CPT | Performed by: PHYSICIAN ASSISTANT

## 2021-12-10 PROCEDURE — 25010000002 ENOXAPARIN PER 10 MG: Performed by: STUDENT IN AN ORGANIZED HEALTH CARE EDUCATION/TRAINING PROGRAM

## 2021-12-10 PROCEDURE — 99232 SBSQ HOSP IP/OBS MODERATE 35: CPT | Performed by: INTERNAL MEDICINE

## 2021-12-10 PROCEDURE — 25010000002 CEFEPIME PER 500 MG: Performed by: STUDENT IN AN ORGANIZED HEALTH CARE EDUCATION/TRAINING PROGRAM

## 2021-12-10 PROCEDURE — 85007 BL SMEAR W/DIFF WBC COUNT: CPT | Performed by: PHYSICIAN ASSISTANT

## 2021-12-10 PROCEDURE — 80069 RENAL FUNCTION PANEL: CPT | Performed by: PHYSICIAN ASSISTANT

## 2021-12-10 RX ORDER — CEFDINIR 300 MG/1
300 CAPSULE ORAL 2 TIMES DAILY
Qty: 14 CAPSULE | Refills: 0 | Status: SHIPPED | OUTPATIENT
Start: 2021-12-10 | End: 2021-12-17

## 2021-12-10 RX ADMIN — ENOXAPARIN SODIUM 40 MG: 40 INJECTION SUBCUTANEOUS at 08:50

## 2021-12-10 RX ADMIN — BUSPIRONE HYDROCHLORIDE 15 MG: 15 TABLET ORAL at 05:52

## 2021-12-10 RX ADMIN — GABAPENTIN 400 MG: 400 CAPSULE ORAL at 08:50

## 2021-12-10 RX ADMIN — CEFEPIME 2 G: 1 INJECTION, SOLUTION INTRAVENOUS at 00:22

## 2021-12-10 RX ADMIN — BUPRENORPHINE AND NALOXONE 1 TABLET: 8; 2 TABLET SUBLINGUAL at 08:50

## 2021-12-10 RX ADMIN — SODIUM CHLORIDE, PRESERVATIVE FREE 10 ML: 5 INJECTION INTRAVENOUS at 08:56

## 2021-12-10 RX ADMIN — CEFEPIME 2 G: 1 INJECTION, SOLUTION INTRAVENOUS at 08:50

## 2021-12-10 RX ADMIN — ACETAMINOPHEN 650 MG: 325 TABLET ORAL at 08:55

## 2021-12-10 RX ADMIN — AMLODIPINE BESYLATE 5 MG: 5 TABLET ORAL at 08:50

## 2021-12-10 RX ADMIN — BUDESONIDE AND FORMOTEROL FUMARATE DIHYDRATE 2 PUFF: 160; 4.5 AEROSOL RESPIRATORY (INHALATION) at 07:10

## 2021-12-10 NOTE — DISCHARGE SUMMARY
[facility]         HOSPITALIST  DISCHARGE SUMMARY    Patient Name: Josue Stern  : 1955  MRN: [id number]    Date of Admission: 2021  Date of Discharge: 12/10/2021  Primary Care Physician: Forrest Duong MD  Reason for admission:  Shortness of air fevers    Final diagnosis:  · Acute hypoxic respiratory failure requiring high flow nasal cannula  · Sepsis due to pneumonia  · Community-acquired pneumonia unspecified organism  · Neutropenia complete etiology unclear likely due to infection  · Hypertension  · History of hepatitis C    Consults     Date and Time Order Name Status Description    2021 10:07 AM Inpatient Pulmonology Consult Completed     2021  9:21 PM Hematology & Oncology Inpatient Consult      2021  6:52 PM IP General Consult (Use specialty-specific consult if known)      2021  6:24 PM Hospitalist (on-call MD unless specified)            Active and Resolved Hospital Problems:  Active Hospital Problems    Diagnosis POA   • Neutropenic fever (HCC) [D70.9, R50.81] Yes      Resolved Hospital Problems   No resolved problems to display.       Hospital Course     Hospital Course:  Josue Stern is a 66 y.o. male with hep C, substance abuse, COPD.  He presented to the ED complaining of generalized weakness.  He was being discharged but felt weak and on his way home driving felt like he was about to pass out.  In the ED he was febrile to 103.  Had neutropenia on labs.  He was admitted to the medicine service pulmonary consulted.  Patient started on broad-spectrum IV antibiotics started on nebulizer treatment supplemental oxygen which has been weaned to room air additionally given Neupogen given his severe neutropenia.  Cultures remained unyielding but clinically is improved leukocyte count improved we will discharge patient home today on oral antibiotics cefdinir to complete an additional 7 days.  Patient to follow-up with his primary care  provider in 5 to 7 days for repeat CBC patient hold Abilify until seen by primary care provider CBC can be repeated.           DISCHARGE Follow Up Recommendations for labs and diagnostics: Follow-up PCP 1 week follow-up with pulmonary as directed.  Repeat CBC 1 week.  Patient remains neutropenic would recommend hematology consultation      Day of Discharge     Vital Signs:  Temp:  [97.6 °F (36.4 °C)-98.5 °F (36.9 °C)] 98.2 °F (36.8 °C)  Heart Rate:  [66-88] 66  Resp:  [18] 18  BP: (143-155)/() 143/83  Physical Exam:  Constitutional: Awake, alert, no acute distress              Eyes: Pupils equal, sclerae anicteric, no conjunctival injection              HENT: NCAT, mucous membranes moist              Neck: Supple, no thyromegaly, no lymphadenopathy, trachea midline              Respiratory: clear but diminished;               Cardiovascular: RRR, no murmurs, rubs, or gallops, palpable pedal pulses bilaterally              Gastrointestinal: Positive bowel sounds, soft, nontender, nondistended              Musculoskeletal: No bilateral ankle edema, no clubbing or cyanosis to extremities              Psychiatric: Appropriate affect, cooperative              Neurologic: Oriented x 3, strength symmetric in all extremities, Cranial Nerves grossly intact to confrontation, speech clear              Skin: No rashes       Discharge Details        Discharge Medications      ASK your doctor about these medications      Instructions Start Date   ARIPiprazole 15 MG tablet  Commonly known as: ABILIFY   15 mg, Oral, Daily      buPROPion  MG 24 hr tablet  Commonly known as: WELLBUTRIN XL   300 mg, Oral, Daily      busPIRone 15 MG tablet  Commonly known as: BUSPAR   15 mg, Oral, 3 Times Daily      gabapentin 800 MG tablet  Commonly known as: NEURONTIN   800 mg, Oral, 3 Times Daily      Inderal LA 60 MG 24 hr capsule  Generic drug: propranolol LA   60 mg, Oral, Daily      mirtazapine 15 MG tablet  Commonly known as:  REMERON   15 mg, Oral, Nightly      Norvasc 5 MG tablet  Generic drug: amLODIPine   5 mg, Oral, Daily      prazosin 1 MG capsule  Commonly known as: MINIPRESS   1 mg, Oral, Nightly      Suboxone 8-2 MG film film  Generic drug: buprenorphine-naloxone   1 film, Sublingual, 2 Times Daily      ZyrTEC Allergy 10 MG tablet  Generic drug: cetirizine   10 mg, Oral, Daily             Allergies   Allergen Reactions   • Aspirin Nausea Only       Discharge Disposition:  Home self care      Diet:  Hospital:  Diet Order   Procedures   • Diet Regular       Discharge Activity: Advance slowly as tolerated      CODE STATUS:  Code Status and Medical Interventions:   Ordered at: 12/05/21 2121     Level Of Support Discussed With:    Patient     Code Status (Patient has no pulse and is not breathing):    CPR (Attempt to Resuscitate)     Medical Interventions (Patient has pulse or is breathing):    Full Support         No future appointments.        Pertinent  and/or Most Recent Results     PROCEDURES:   PROCEDURE:  CT CHEST WO CONTRAST DIAGNOSTIC     COMPARISONS:           [facility], CR, XR CHEST 1 VW, 11/23/2021, 6:30.                 [facility], CR, XR CHEST 1 VW, 12/05/2021, 15:01.                 [facility], CR, XR CHEST 1 VW, 12/06/2021, 15:03.                 [facility], CT, CHEST W/O CONTRAST, 12/22/2020, 14:39.     INDICATIONS:  COPD, acute exacerbation.     TECHNIQUE:    547 CT images were created without the administration of contrast material.       PROTOCOL:     Standard imaging protocol performed                 RADIATION:      DLP: 572 mGy*cm               Automated exposure control was utilized to minimize radiation dose.      FINDINGS:        Bilateral infiltrates are seen, predominantly in the lung bases.  The findings may   represent infectious multifocal pneumonia.  Aspiration pneumonia is possible.  The CT imaging   features are atypical or uncommonly reported  for COVID-19 pneumonia.  Alternative diagnoses should   be considered.  Please correlate with pertinent lab values.  Mild to moderate emphysematous changes   involve the lungs, especially paraseptal but also probably centrilobular in nature.  The central   tracheobronchial tree is well aerated without filling defect.  There is slight motion artifact on   the exam.  There may be borderline cardiac enlargement.  A small hiatal hernia is possible.  No   acute findings are suggested in the partially imaged upper abdomen by nonenhanced CT examination.    There is also a small umbilical hernia.  It contains fat.  It does not contain bowel.    Atherosclerotic changes are noted, including involvement of the coronary arteries.  Probably no   aneurysmal dilatation of the ascending aorta is identified.  It has a maximum diameter of about 3.6   cm.  There is chronic calcified granulomatous disease of the chest.  No pneumothorax or   pneumomediastinum.  There may be mild degenerative changes of the imaged spine.  Minimal, if any,   pleural effusion is seen.  A trace amount of pericardial effusion is possible.  There are   nonspecific likely reactive small to moderate sized mediastinal and possibly hilar lymph     CONCLUSION: Bibasilar pneumonia is suggested.  Aspiration pneumonia would be in the differential   diagnosis.  Please see above comments for further detail.      LAB RESULTS:      Lab 12/10/21  0430 12/09/21  0433 12/08/21  0444 12/07/21  0535 12/06/21  0827 12/06/21  0601 12/06/21  0600 12/05/21  1701 12/05/21  1555 12/05/21  1555   WBC 2.20* 1.63* 1.31* 0.64*  --  0.84*  --   --    < > 0.59*   HEMOGLOBIN 11.6* 11.1* 11.4* 11.3*  --  11.0*  --   --    < > 13.4   HEMATOCRIT 35.3* 32.9* 34.8* 34.2*  --  36.6*  --   --    < > 40.8   PLATELETS 277 265 261 234  --  221  --   --    < > 266   NEUTROS ABS 1.10* 0.62* 0.19* 0.03*  --  0.01*  --   --    < > 0.01*   IMMATURE GRANS (ABS) 0.01 0.00 0.00 0.02  --  0.00  --   --    <  > 0.00   LYMPHS ABS 0.78 0.73 0.82 0.38*  --  0.69*  --   --    < > 0.45*   MONOS ABS 0.10 0.09* 0.12 0.09*  --  0.10  --   --    < > 0.10   EOS ABS 0.16 0.14 0.16 0.10  --  0.02  --   --    < > 0.01   MCV 85.1 81.4 84.1 83.6  --  93.6  --   --    < > 85.0   SED RATE  --   --   --   --  29*  --   --   --   --   --    CRP  --   --   --   --   --   --   --  7.66*  --   --    PROCALCITONIN  --   --   --  0.29*  --   --  0.39*  --   --   --    LACTATE  --   --   --   --   --   --   --   --   --  1.5    < > = values in this interval not displayed.         Lab 12/10/21  0430 12/09/21  0433 12/08/21  0445 12/07/21  0535 12/06/21  0600   SODIUM 140 140 140 136 135*   POTASSIUM 3.7 3.5 3.9 3.7 4.2   CHLORIDE 106 105 108* 106 106   CO2 23.0 25.7 26.0 23.1 21.1*   ANION GAP 11.0 9.3 6.0 6.9 7.9   BUN 8 6* 7* 9 12   CREATININE 0.83 0.74* 0.79 0.71* 0.83   GLUCOSE 125* 111* 119* 123* 158*   CALCIUM 9.0 9.0 8.7 8.4* 8.0*   MAGNESIUM 1.7 1.8 1.8  --  1.8   PHOSPHORUS 3.4 2.7 2.2*  --   --    TSH  --   --   --   --  0.256*         Lab 12/10/21  0430 12/09/21  0433 12/08/21  0445 12/07/21  0535 12/06/21  0600 12/05/21  1701 12/05/21  1701   TOTAL PROTEIN  --   --  6.1 5.9* 5.7*  --  6.4   ALBUMIN 3.10* 3.30* 3.30* 3.00* 2.90*   < > 3.50   GLOBULIN  --   --  2.8 2.9 2.8  --  2.9   ALT (SGPT)  --   --  6 13 14  --  15   AST (SGOT)  --   --  21 15 21  --  16   BILIRUBIN  --   --  0.3 0.4 0.7  --  0.7   ALK PHOS  --   --  79 74 73  --  82    < > = values in this interval not displayed.         Lab 12/07/21  0535   PROBNP 714.0             Lab 12/08/21  0445 12/08/21  0444   IRON 15*  --    IRON SATURATION 7*  --    TIBC 224*  --    TRANSFERRIN 150*  --    FOLATE  --  10.90   VITAMIN B 12  --  1,022*         Brief Urine Lab Results  (Last result in the past 365 days)      Color   Clarity   Blood   Leuk Est   Nitrite   Protein   CREAT   Urine HCG        12/05/21 1738 Yellow   Clear   Negative   Negative   Negative   Negative                Microbiology Results (last 10 days)     Procedure Component Value - Date/Time    COVID PRE-OP / PRE-PROCEDURE SCREENING ORDER (NO ISOLATION) - Swab, Nasopharynx [[id number]]  (Normal) Collected: 12/07/21 1058    Lab Status: Final result Specimen: Swab from Nasopharynx Updated: 12/07/21 1540    Narrative:      The following orders were created for panel order COVID PRE-OP / PRE-PROCEDURE SCREENING ORDER (NO ISOLATION) - Swab, Nasopharynx.  Procedure                               Abnormality         Status                     ---------                               -----------         ------                     COVID-19,CEPHEID/WES/BD...[[id number]]  Normal              Final result                 Please view results for these tests on the individual orders.    COVID-19,CEPHEID/WES/BDMAX,COR/WILLIAM/PAD/VARSHA IN-HOUSE(OR EMERGENT/ADD-ON),NP SWAB IN TRANSPORT MEDIA 3-4 HR TAT, RT-PCR - Swab, Nasopharynx [[id number]]  (Normal) Collected: 12/07/21 1058    Lab Status: Final result Specimen: Swab from Nasopharynx Updated: 12/07/21 1540     COVID19 Not Detected    Narrative:      Fact sheet for providers: https://www.fda.gov/media/146663/download     Fact sheet for patients: https://www.fda.gov/media/938813/download  Presumptive Positive: additional testing may be indicated if it is necessary to differentiate between SARS-CoV-2 and other Sarbecovirus, for epidemiological purposes, or clinical management.    MRSA Screen, PCR (Inpatient) - Swab, Nares [[id number]]  (Normal) Collected: 12/07/21 1058    Lab Status: Final result Specimen: Swab from Nares Updated: 12/07/21 1233     MRSA PCR No MRSA Detected    Influenza Antigen, Rapid - Swab, Nasopharynx [[id number]]  (Normal) Collected: 12/07/21 1058    Lab Status: Final result Specimen: Swab from Nasopharynx Updated: 12/07/21 1147     Influenza A Ag, EIA Negative     Influenza B Ag, EIA Negative    Respiratory Culture - Sputum, Cough [[id number]] Collected: 12/06/21 1242    Lab  Status: Final result Specimen: Sputum from Cough Updated: 12/06/21 1514     Respiratory Culture Rejected     Gram Stain Greater than 20 Epithelial cells per low power field      Less than 10 WBCs per low power field      Rare (1+) Gram positive cocci    Narrative:      Specimen rejected due to microscopic exam of gram stain.    Legionella Antigen, Urine - Urine, Urine, Clean Catch [[id number]]  (Normal) Collected: 12/06/21 0031    Lab Status: Final result Specimen: Urine, Clean Catch Updated: 12/06/21 0818     LEGIONELLA ANTIGEN, URINE Negative    S. Pneumo Ag Urine or CSF - Urine, Urine, Clean Catch [[id number]]  (Normal) Collected: 12/06/21 0031    Lab Status: Final result Specimen: Urine, Clean Catch Updated: 12/06/21 0818     Strep Pneumo Ag Negative    RSV Screen - Swab, Nasopharynx [[id number]]  (Normal) Collected: 12/05/21 2307    Lab Status: Final result Specimen: Swab from Nasopharynx Updated: 12/06/21 0115     RSV Rapid Ag Negative    COVID-19,CEPHEID/WES/BDMAX,COR/WILLIAM/PAD/VARSHA IN-HOUSE(OR EMERGENT/ADD-ON),NP SWAB IN TRANSPORT MEDIA 3-4 HR TAT, RT-PCR - Swab, Nasopharynx [[id number]]  (Normal) Collected: 12/05/21 1737    Lab Status: Final result Specimen: Swab from Nasopharynx Updated: 12/05/21 1926     COVID19 Not Detected    Narrative:      Fact sheet for providers: https://www.fda.gov/media/203977/download     Fact sheet for patients: https://www.fda.gov/media/150107/download  Fact sheet for providers: https://www.fda.gov/media/736389/download     Fact sheet for patients: https://www.fda.gov/media/818437/download                       Results for orders placed during the hospital encounter of 12/05/21    Adult Transthoracic Echo Complete W/ Cont if Necessary Per Protocol    Interpretation Summary  · Normal left ventricular systolic function the calculation fraction was 65%  · The study was fairly normal for patient age      Labs Pending at Discharge:  Pending Labs     Order Current Status    Blood Culture -  Blood, Collected (12/05/21 4301)    Blood Culture - Blood, Collected (12/05/21 6085)        Condition at discharge: Stable for discharge    Time spent on Discharge including face to face service: 35 minutes    Electronically signed by ELIDA Bell, 12/10/21, 10:33 AM EST.  Attending Note:  I have seen and examined the patient on the day of discharge and agree with above per Mr. Janett PA-C.  Pleasant 66-year-old male presented with fever, hypoxia, was neutropenic upon presentation as well with white blood cell count less than [place].  He was admitted and initially required up to 10 L of oxygen but this was weaned down fairly quickly.  Was diagnosed with pneumonia but cultures did not grow anything.  Covid was negative.  Clinically he improved with antibiotics.  Will discharge home today and can complete a course of antibiotics.  We did give him Neupogen on hospital day 2 due to persistently low white blood cell count and that has improved.  Patient to followup with primary care provider.  Electronically signed by Ronal Fischer MD, 12/10/21, 5:18 PM EST.

## 2021-12-10 NOTE — PLAN OF CARE
Goal Outcome Evaluation:            Ongoing  Patient has had an uneventful night, rested quietly all night.  Patient verbalizes he is ready to go home as soon as the doctor will allow.     well-groomed/well-nourished/well-developed/no distress

## 2021-12-10 NOTE — PROGRESS NOTES
Pulmonary / Critical Care Progress Note      Patient Name: Josue Stern  : 1955  MRN: [id number]  Primary Care Physician:  Forrest Doung MD  Date of admission: 2021    Subjective   Subjective   Follow-up for hypoxia    Over past 24 hours, continues on antibiotics and nebulizers.    No acute events overnight.    This morning,  Lying in bed on room air  Feeling better  Improving dyspnea  Nonproductive cough  No chest pain  No fever or chills  Wants to go home    Review of Systems  General:  + Fatigue, No Fever  HEENT:  No Dysphagia, No Visual Changes  Respiratory:  + Cough, + Dyspnea, No Pleuritic Pain  Cardiovascular:  No Chest Pain, No Palpitations, No FERNANDEZ, No Chest Pressure  Gastrointestinal:  No Abdominal Pain, No Nausea, No Vomiting, No Diarrhea  Genitourinary:  No Dysuria, No Frequency, No Hesitancy  Musculoskeletal:  No Joint Tenderness, No Joint Stiffness, + weakness    Objective   Objective     Vitals:   Temp:  [97.6 °F (36.4 °C)-98.5 °F (36.9 °C)] 97.6 °F (36.4 °C)  Heart Rate:  [61-88] 85  Resp:  [18] 18  BP: (152-159)/() 152/89  Flow (L/min):  [2] 2    Physical Exam   Vital Signs Reviewed   General: WDWN male, Awake and Alert, NAD on 2 L NC    HEENT:  PERRL, EOMI.  OP, nares clear, no sinus tenderness  Neck:  Supple, no JVD, no thyromegaly  Chest:  good aeration, clear to auscultation bilaterally, tympanic to percussion bilaterally, no work of breathing noted  CV: NSR 91, no MGR, pulses 2+, equal  Abd:  Soft, NT, ND, + BS, no HSM  EXT:  no clubbing, no cyanosis, no edema  Neuro:  A&Ox3, CN grossly intact, no focal deficits  Skin: No rashes or lesions noted    Result Review    Result Review:  I have personally reviewed the results from the time of this admission to 12/10/2021 07:24 EST and agree with these findings:  [x]  Laboratory  [x]  Microbiology  [x]  Radiology  [x]  EKG/Telemetry   []  Cardiology/Vascular   []  Pathology  []  Old records  []  Other:  Most notable  findings include: WBC increased to 2.2, RBC increased to 4.15, H&H 11/35, platelets 277    Cultures negative    Assessment/Plan   Assessment / Plan     Active Hospital Problems:  Active Hospital Problems    Diagnosis    • Neutropenic fever (HCC)      Impression:  Acute hypoxic respiratory failure requiring HFNC  Community acquired pneumonia of unspecified organism  Rule out sepsis  Neutropenia  HTN  Hx of Hepatitis C    Plan:  Continue to wean O2 to keep sats >90%.  Will need 6MWT prior to discharge.  Infectious work-up negative to date.  Will continue Cefepime. Would recommend transitioning to Cefdnir at discharge to complete 10 day course.  WBC continues to trend up after epotin given   Continue Pulmicort, Brovana, and Duonebs.  Continue bronchopulmonary hygiene.  Encourage IS and flutter valve.  Speech therapy--> no overt signs of aspiration.  Keep HOB elevated after eating.  Do not eat close to bedtime.  Continue home Remeron for sleep.  Encourage activity.  Up to chair as tolerated.    Ok from pulmonary standpoint to discharge home today  with antibiotics.  Follow up in our clinic in 2 weeks, have blood work rechecked in a few weeks to monitor pancytopenia.       DVT prophylaxis:  Medical DVT prophylaxis orders are present.    CODE STATUS:   Level Of Support Discussed With: Patient  Code Status (Patient has no pulse and is not breathing): CPR (Attempt to Resuscitate)  Medical Interventions (Patient has pulse or is breathing): Full Support       [facility]-NP, am scribing for & in the presence of Dr. Meredith on 12/10/21, who was present during rounds with me.    Part of this note may be an electronic transcription/translation of spoken language to printed text using the Dragon Dictation System.    Electronically signed by Refugio Meredith MD, 12/10/21, 2:12 PM EST.

## 2021-12-11 ENCOUNTER — READMISSION MANAGEMENT (OUTPATIENT)
Dept: CALL CENTER | Facility: HOSPITAL | Age: 66
End: 2021-12-11

## 2021-12-11 NOTE — OUTREACH NOTE
Prep Survey      Responses   [facility]? [facility]   Is LACE score < 7 ? No   Emergency Room discharge w/ pulse ox? No   Eligibility Readm Mgmt   Discharge diagnosis Sepsis due to pneumonia   Does the patient have one of the following disease processes/diagnoses(primary or secondary)? Sepsis   Does the patient have Home health ordered? No   Is there a DME ordered? No   Comments regarding appointments f/u appts needed   Prep survey completed? Yes          Dolores Hernandez RN

## 2021-12-15 ENCOUNTER — READMISSION MANAGEMENT (OUTPATIENT)
Dept: CALL CENTER | Facility: HOSPITAL | Age: 66
End: 2021-12-15

## 2021-12-15 NOTE — OUTREACH NOTE
Sepsis Week 1 Survey      Responses   [facility] patient discharged from? [facility]   Does the patient have one of the following disease processes/diagnoses(primary or secondary)? Sepsis   Week 1 attempt successful? Yes   Call start time 1006   Call end time 1009   Discharge diagnosis Sepsis due to pneumonia   Meds reviewed with patient/caregiver? Yes   Is the patient having any side effects they believe may be caused by any medication additions or changes? No   Does the patient have all medications related to this admission filled (includes all antibiotics, inhalers, nebulizers,steroids,etc.) Yes   Is the patient taking all medications as directed (includes completed medication regime)? Yes   Does the patient have a primary care provider?  Yes   Comments regarding PCP PCP appt.12/20/21   Does the patient have an appointment with their PCP within 7 days of discharge? Greater than 7 days   What is preventing the patient from scheduling follow up appointments within 7 days of discharge? Haven't had time   Nursing Interventions Verified appointment date/time/provider   Has the patient kept scheduled appointments due by today? Yes   Has home health visited the patient within 72 hours of discharge? N/A   Has all DME been delivered? No   Did the patient receive a copy of their discharge instructions? Yes   Nursing interventions Reviewed instructions with patient   What is the patient's perception of their health status since discharge? Improving   Nursing interventions Nurse provided patient education   Is the patient/caregiver able to teach back Sepsis? S - Shivering,fever or very cold,  S - Short of breath,  E - Extreme pain or generalized discomfort (worst ever,especially abdomen)   Nursing interventions Nurse provided patient education   Is patient/caregiver able to teach back steps to recovery at home? Set small, achievable goals for return to baseline health,  Rest and regain strength,  Eat a balanced diet   Is  the patient/caregiver able to teach back signs and symptoms of worsening condition: Fever,  Rapid heart rate (>90),  Altered mental status(confusion/coma)   If the patient is a current smoker, are they able to teach back resources for cessation? Smoking cessation medications   Is the patient/caregiver able to teach back the hierarchy of who to call/visit for symptoms/problems? PCP, Specialist, Home health nurse, Urgent Care, ED, 911 Yes   Week 1 call completed? Yes   Wrap up additional comments Patient states he is doing better, appetite is back and he is resting well.          Ele Arroyo RN

## 2021-12-21 ENCOUNTER — READMISSION MANAGEMENT (OUTPATIENT)
Dept: CALL CENTER | Facility: HOSPITAL | Age: 66
End: 2021-12-21

## 2021-12-21 NOTE — OUTREACH NOTE
Sepsis Week 2 Survey      Responses   [facility] patient discharged from? [facility]   Does the patient have one of the following disease processes/diagnoses(primary or secondary)? Sepsis   Week 2 attempt successful? Yes   Call start time 1711   Call end time 1712   Discharge diagnosis Sepsis due to pneumonia   Meds reviewed with patient/caregiver? Yes   Is the patient having any side effects they believe may be caused by any medication additions or changes? No   Does the patient have all medications related to this admission filled (includes all antibiotics, inhalers, nebulizers,steroids,etc.) Yes   Is the patient taking all medications as directed (includes completed medication regime)? Yes   Does the patient have a primary care provider?  Yes   Has the patient kept scheduled appointments due by today? Yes   Has home health visited the patient within 72 hours of discharge? N/A   Has all DME been delivered? No   Psychosocial issues? No   Did the patient receive a copy of their discharge instructions? Yes   Nursing interventions Reviewed instructions with patient   What is the patient's perception of their health status since discharge? Improving   Nursing interventions Nurse provided patient education   Is the patient/caregiver able to teach back Sepsis? S - Short of breath,  S - Shivering,fever or very cold   Nursing interventions Nurse provided reassurance to patient   Is patient/caregiver able to teach back steps to recovery at home? Set small, achievable goals for return to baseline health,  Rest and regain strength,  Make a list of questions for PCP appoinment   Is the patient/caregiver able to teach back signs and symptoms of worsening condition: Fever,  Shortness of breath/rapid respiratory rate   Is the patient/caregiver able to teach back the hierarchy of who to call/visit for symptoms/problems? PCP, Specialist, Home health nurse, Urgent Care, ED, 911 Yes   Additional teach back comments He is back to  work, denies SOA and fever.   Week 2 call completed? Yes   Wrap up additional comments No issues, improving          Ramona Wheat RN

## 2022-12-01 ENCOUNTER — HOSPITAL ENCOUNTER (EMERGENCY)
Facility: HOSPITAL | Age: 67
Discharge: HOME | End: 2022-12-01
Attending: EMERGENCY MEDICINE | Admitting: EMERGENCY MEDICINE

## 2022-12-01 ENCOUNTER — APPOINTMENT (OUTPATIENT)
Dept: GENERAL RADIOLOGY | Facility: HOSPITAL | Age: 67
End: 2022-12-01

## 2022-12-01 VITALS
WEIGHT: 162.7 LBS | TEMPERATURE: 99 F | HEART RATE: 66 BPM | SYSTOLIC BLOOD PRESSURE: 165 MMHG | DIASTOLIC BLOOD PRESSURE: 103 MMHG | BODY MASS INDEX: 26.15 KG/M2 | RESPIRATION RATE: 12 BRPM | HEIGHT: 66 IN | OXYGEN SATURATION: 94 %

## 2022-12-01 DIAGNOSIS — J44.1 COPD EXACERBATION: Primary | ICD-10-CM

## 2022-12-01 LAB
ALBUMIN SERPL-MCNC: 4.5 G/DL (ref 3.5–5.2)
ALBUMIN/GLOB SERPL: 1.6 G/DL
ALP SERPL-CCNC: 84 U/L (ref 39–117)
ALT SERPL W P-5'-P-CCNC: 18 U/L (ref 1–41)
ANION GAP SERPL CALCULATED.3IONS-SCNC: 10.5 MMOL/L (ref 5–15)
ARTERIAL PATENCY WRIST A: POSITIVE
AST SERPL-CCNC: 25 U/L (ref 1–40)
BASE EXCESS BLDA CALC-SCNC: -0.5 MMOL/L (ref -2–2)
BASOPHILS # BLD AUTO: 0.02 10*3/MM3 (ref 0–0.2)
BASOPHILS NFR BLD AUTO: 0.3 % (ref 0–1.5)
BDY SITE: ABNORMAL
BILIRUB SERPL-MCNC: 0.4 MG/DL (ref 0–1.2)
BUN SERPL-MCNC: 20 MG/DL (ref 8–23)
BUN/CREAT SERPL: 17.1 (ref 7–25)
CALCIUM SPEC-SCNC: 9.4 MG/DL (ref 8.6–10.5)
CHLORIDE SERPL-SCNC: 103 MMOL/L (ref 98–107)
CO2 SERPL-SCNC: 27.5 MMOL/L (ref 22–29)
COHGB MFR BLD: 1.9 % (ref 0–1.5)
CREAT SERPL-MCNC: 1.17 MG/DL (ref 0.76–1.27)
DEPRECATED RDW RBC AUTO: 44 FL (ref 37–54)
EGFRCR SERPLBLD CKD-EPI 2021: 67.9 ML/MIN/1.73
EOSINOPHIL # BLD AUTO: 0 10*3/MM3 (ref 0–0.4)
EOSINOPHIL NFR BLD AUTO: 0 % (ref 0.3–6.2)
ERYTHROCYTE [DISTWIDTH] IN BLOOD BY AUTOMATED COUNT: 14.1 % (ref 12.3–15.4)
FHHB: 9.7 % (ref 0–5)
FLUAV AG NPH QL: NEGATIVE
FLUBV AG NPH QL IA: NEGATIVE
GLOBULIN UR ELPH-MCNC: 2.9 GM/DL
GLUCOSE SERPL-MCNC: 131 MG/DL (ref 65–99)
HCO3 BLDA-SCNC: 24.6 MMOL/L (ref 22–26)
HCT VFR BLD AUTO: 42.8 % (ref 37.5–51)
HGB BLD-MCNC: 13.9 G/DL (ref 13–17.7)
HGB BLDA-MCNC: 14.9 G/DL (ref 13.8–16.4)
HOLD SPECIMEN: NORMAL
HOLD SPECIMEN: NORMAL
IMM GRANULOCYTES # BLD AUTO: 0.01 10*3/MM3 (ref 0–0.05)
IMM GRANULOCYTES NFR BLD AUTO: 0.2 % (ref 0–0.5)
INHALED O2 CONCENTRATION: 21 %
LYMPHOCYTES # BLD AUTO: 0.3 10*3/MM3 (ref 0.7–3.1)
LYMPHOCYTES NFR BLD AUTO: 4.8 % (ref 19.6–45.3)
MCH RBC QN AUTO: 27.8 PG (ref 26.6–33)
MCHC RBC AUTO-ENTMCNC: 32.5 G/DL (ref 31.5–35.7)
MCV RBC AUTO: 85.6 FL (ref 79–97)
METHGB BLD QL: 0.1 % (ref 0–1.5)
MODALITY: ABNORMAL
MONOCYTES # BLD AUTO: 0.39 10*3/MM3 (ref 0.1–0.9)
MONOCYTES NFR BLD AUTO: 6.2 % (ref 5–12)
NEUTROPHILS NFR BLD AUTO: 5.57 10*3/MM3 (ref 1.7–7)
NEUTROPHILS NFR BLD AUTO: 88.5 % (ref 42.7–76)
NRBC BLD AUTO-RTO: 0 /100 WBC (ref 0–0.2)
NT-PROBNP SERPL-MCNC: 4081 PG/ML (ref 0–900)
OXYHGB MFR BLDV: 88.3 % (ref 94–99)
PCO2 BLDA: 42.1 MM HG (ref 35–45)
PH BLDA: 7.38 PH UNITS (ref 7.35–7.45)
PLATELET # BLD AUTO: 157 10*3/MM3 (ref 140–450)
PMV BLD AUTO: 10 FL (ref 6–12)
PO2 BLD: 265 MM[HG] (ref 0–500)
PO2 BLDA: 55.7 MM HG (ref 80–100)
POTASSIUM SERPL-SCNC: 5 MMOL/L (ref 3.5–5.2)
PROT SERPL-MCNC: 7.4 G/DL (ref 6–8.5)
QT INTERVAL: 378 MS
RBC # BLD AUTO: 5 10*6/MM3 (ref 4.14–5.8)
SAO2 % BLDCOA: 90.1 % (ref 95–99)
SODIUM SERPL-SCNC: 141 MMOL/L (ref 136–145)
TROPONIN T SERPL-MCNC: <0.01 NG/ML (ref 0–0.03)
WBC NRBC COR # BLD: 6.29 10*3/MM3 (ref 3.4–10.8)
WHOLE BLOOD HOLD COAG: NORMAL
WHOLE BLOOD HOLD SPECIMEN: NORMAL

## 2022-12-01 PROCEDURE — 36600 WITHDRAWAL OF ARTERIAL BLOOD: CPT | Performed by: EMERGENCY MEDICINE

## 2022-12-01 PROCEDURE — 99284 EMERGENCY DEPT VISIT MOD MDM: CPT

## 2022-12-01 PROCEDURE — 71045 X-RAY EXAM CHEST 1 VIEW: CPT

## 2022-12-01 PROCEDURE — 85025 COMPLETE CBC W/AUTO DIFF WBC: CPT | Performed by: EMERGENCY MEDICINE

## 2022-12-01 PROCEDURE — 83050 HGB METHEMOGLOBIN QUAN: CPT | Performed by: EMERGENCY MEDICINE

## 2022-12-01 PROCEDURE — 84484 ASSAY OF TROPONIN QUANT: CPT | Performed by: EMERGENCY MEDICINE

## 2022-12-01 PROCEDURE — 82805 BLOOD GASES W/O2 SATURATION: CPT | Performed by: EMERGENCY MEDICINE

## 2022-12-01 PROCEDURE — 94640 AIRWAY INHALATION TREATMENT: CPT

## 2022-12-01 PROCEDURE — 25010000002 METHYLPREDNISOLONE PER 125 MG: Performed by: EMERGENCY MEDICINE

## 2022-12-01 PROCEDURE — 87804 INFLUENZA ASSAY W/OPTIC: CPT | Performed by: EMERGENCY MEDICINE

## 2022-12-01 PROCEDURE — 93005 ELECTROCARDIOGRAM TRACING: CPT | Performed by: EMERGENCY MEDICINE

## 2022-12-01 PROCEDURE — 94799 UNLISTED PULMONARY SVC/PX: CPT

## 2022-12-01 PROCEDURE — 93010 ELECTROCARDIOGRAM REPORT: CPT | Performed by: INTERNAL MEDICINE

## 2022-12-01 PROCEDURE — 80053 COMPREHEN METABOLIC PANEL: CPT | Performed by: EMERGENCY MEDICINE

## 2022-12-01 PROCEDURE — 36415 COLL VENOUS BLD VENIPUNCTURE: CPT | Performed by: EMERGENCY MEDICINE

## 2022-12-01 PROCEDURE — 82375 ASSAY CARBOXYHB QUANT: CPT | Performed by: EMERGENCY MEDICINE

## 2022-12-01 PROCEDURE — 93005 ELECTROCARDIOGRAM TRACING: CPT

## 2022-12-01 PROCEDURE — 83880 ASSAY OF NATRIURETIC PEPTIDE: CPT | Performed by: EMERGENCY MEDICINE

## 2022-12-01 PROCEDURE — 96374 THER/PROPH/DIAG INJ IV PUSH: CPT

## 2022-12-01 RX ORDER — SODIUM CHLORIDE 0.9 % (FLUSH) 0.9 %
10 SYRINGE (ML) INJECTION AS NEEDED
Status: DISCONTINUED | OUTPATIENT
Start: 2022-12-01 | End: 2022-12-01 | Stop reason: HOSPADM

## 2022-12-01 RX ORDER — METHYLPREDNISOLONE SODIUM SUCCINATE 125 MG/2ML
125 INJECTION, POWDER, LYOPHILIZED, FOR SOLUTION INTRAMUSCULAR; INTRAVENOUS ONCE
Status: COMPLETED | OUTPATIENT
Start: 2022-12-01 | End: 2022-12-01

## 2022-12-01 RX ORDER — IPRATROPIUM BROMIDE AND ALBUTEROL SULFATE 2.5; .5 MG/3ML; MG/3ML
3 SOLUTION RESPIRATORY (INHALATION) ONCE
Status: COMPLETED | OUTPATIENT
Start: 2022-12-01 | End: 2022-12-01

## 2022-12-01 RX ORDER — AZITHROMYCIN 250 MG/1
TABLET, FILM COATED ORAL
Qty: 6 TABLET | Refills: 0 | Status: SHIPPED | OUTPATIENT
Start: 2022-12-01

## 2022-12-01 RX ORDER — ALBUTEROL SULFATE 90 UG/1
2 AEROSOL, METERED RESPIRATORY (INHALATION) EVERY 4 HOURS PRN
Qty: 18 G | Refills: 0 | Status: SHIPPED | OUTPATIENT
Start: 2022-12-01

## 2022-12-01 RX ORDER — PREDNISONE 50 MG/1
50 TABLET ORAL DAILY
Qty: 5 TABLET | Refills: 0 | Status: SHIPPED | OUTPATIENT
Start: 2022-12-01

## 2022-12-01 RX ADMIN — METHYLPREDNISOLONE SODIUM SUCCINATE 125 MG: 125 INJECTION, POWDER, FOR SOLUTION INTRAMUSCULAR; INTRAVENOUS at 11:12

## 2022-12-01 RX ADMIN — IPRATROPIUM BROMIDE AND ALBUTEROL SULFATE 3 ML: .5; 3 SOLUTION RESPIRATORY (INHALATION) at 09:32

## 2022-12-01 NOTE — ED PROVIDER NOTES
Time: 9:14 AM EST  Arrived by: EMS  Chief Complaint: SOB  History provided by: patient/EMS  History is limited by: N/A    History of Present Illness:  Patient is a 67 y.o. year old male who presents to the emergency department from work with shortness of breath. Patient has hx of asthma. Patient state he does not wear O2 at home. Patient endorses fever, cough onset yesterday. Patient states takes blood pressure medicine but has not taken any medicine today. Patient states he is a smoker.       History provided by:  Patient and EMS personnel   used: No        Patient Care Team  Primary Care Provider: Forrest Duong MD    Past Medical History:     Allergies   Allergen Reactions   • Aspirin Nausea Only     Past Medical History:   Diagnosis Date   • Asthma    • Hypertension      History reviewed. No pertinent surgical history.  History reviewed. No pertinent family history.    Home Medications:  Prior to Admission medications    Medication Sig Start Date End Date Taking? Authorizing Provider   amLODIPine (Norvasc) 5 MG tablet Take 5 mg by mouth Daily.    William Dias MD   buprenorphine-naloxone (Suboxone) 8-2 MG film film Place 1 film under the tongue 2 (Two) Times a Day.    William Dias MD   buPROPion XL (WELLBUTRIN XL) 300 MG 24 hr tablet Take 300 mg by mouth Daily.    William Dias MD   busPIRone (BUSPAR) 15 MG tablet Take 15 mg by mouth 3 (Three) Times a Day.    William Dias MD   cetirizine (ZyrTEC Allergy) 10 MG tablet Take 10 mg by mouth Daily.    William Dias MD   gabapentin (NEURONTIN) 800 MG tablet Take 800 mg by mouth 3 (Three) Times a Day.    William Dias MD   mirtazapine (REMERON) 15 MG tablet Take 15 mg by mouth Every Night.    William Dias MD   prazosin (MINIPRESS) 1 MG capsule Take 1 mg by mouth Every Night.    William Dias MD   propranolol LA (Inderal LA) 60 MG 24 hr capsule Take 60 mg by mouth Daily.     "Provider, Historical, MD        Social History:   Social History     Tobacco Use   • Smoking status: Every Day     Packs/day: 0.50     Types: Cigarettes   • Smokeless tobacco: Never   Vaping Use   • Vaping Use: Every day   Substance Use Topics   • Alcohol use: Never   • Drug use: Never     Recent travel: not applicable    Review of Systems:  Review of Systems   Constitutional: Positive for fever. Negative for chills.   HENT: Negative for sore throat.    Eyes: Negative for photophobia.   Respiratory: Positive for cough and shortness of breath.    Cardiovascular: Negative for chest pain.   Gastrointestinal: Negative for abdominal pain, diarrhea, nausea and vomiting.   Genitourinary: Negative for dysuria.   Musculoskeletal: Negative for neck pain.   Skin: Negative for wound.   Neurological: Negative for headaches.   All other systems reviewed and are negative.       Physical Exam:  BP (!) 165/103   Pulse 66   Temp 99 °F (37.2 °C) (Oral)   Resp 12   Ht 167.6 cm (66\")   Wt 73.8 kg (162 lb 11.2 oz)   SpO2 94%   BMI 26.26 kg/m²     Physical Exam  Vitals and nursing note reviewed.   Constitutional:       General: He is not in acute distress.  HENT:      Head: Normocephalic and atraumatic.   Eyes:      Extraocular Movements: Extraocular movements intact.   Cardiovascular:      Rate and Rhythm: Normal rate and regular rhythm.   Pulmonary:      Effort: Pulmonary effort is normal. No respiratory distress.      Breath sounds: Decreased breath sounds (Extremely ) present. No wheezing, rhonchi or rales.   Abdominal:      General: Abdomen is flat. There is no distension.      Palpations: Abdomen is soft.      Tenderness: There is no abdominal tenderness. There is no guarding or rebound.   Musculoskeletal:         General: Normal range of motion.      Cervical back: Normal range of motion and neck supple.   Skin:     General: Skin is warm and dry.      Capillary Refill: Capillary refill takes less than 2 seconds. "   Neurological:      Mental Status: He is alert and oriented to person, place, and time. Mental status is at baseline.                Medications in the Emergency Department:  Medications   sodium chloride 0.9 % flush 10 mL (has no administration in time range)   ipratropium-albuterol (DUO-NEB) nebulizer solution 3 mL (3 mL Nebulization Given 12/1/22 0932)   methylPREDNISolone sodium succinate (SOLU-Medrol) injection 125 mg (125 mg Intravenous Given 12/1/22 1112)        Labs  Lab Results (last 24 hours)     Procedure Component Value Units Date/Time    CBC & Differential [[id number]]  (Abnormal) Collected: 12/01/22 0907    Specimen: Blood Updated: 12/01/22 1312    Narrative:      The following orders were created for panel order CBC & Differential.  Procedure                               Abnormality         Status                     ---------                               -----------         ------                     CBC Auto Differential[[id number]]        Abnormal            Final result               Scan Slide[[id number]]                                                                    Please view results for these tests on the individual orders.    Comprehensive Metabolic Panel [[id number]]  (Abnormal) Collected: 12/01/22 0907    Specimen: Blood Updated: 12/01/22 0949     Glucose 131 mg/dL      BUN 20 mg/dL      Creatinine 1.17 mg/dL      Sodium 141 mmol/L      Potassium 5.0 mmol/L      Chloride 103 mmol/L      CO2 27.5 mmol/L      Calcium 9.4 mg/dL      Total Protein 7.4 g/dL      Albumin 4.50 g/dL      ALT (SGPT) 18 U/L      AST (SGOT) 25 U/L      Alkaline Phosphatase 84 U/L      Total Bilirubin 0.4 mg/dL      Globulin 2.9 gm/dL      A/G Ratio 1.6 g/dL      BUN/Creatinine Ratio 17.1     Anion Gap 10.5 mmol/L      eGFR 67.9 mL/min/1.73      Comment: National Kidney Foundation and American Society of Nephrology (ASN) Task Force recommended calculation based on the Chronic Kidney Disease Epidemiology  Collaboration (CKD-EPI) equation refit without adjustment for race.       Narrative:      GFR Normal >60  Chronic Kidney Disease <60  Kidney Failure <15      BNP [[id number]]  (Abnormal) Collected: 12/01/22 0907    Specimen: Blood Updated: 12/01/22 0946     proBNP 4,081.0 pg/mL     Narrative:      Among patients with dyspnea, NT-proBNP is highly sensitive for the detection of acute congestive heart failure. In addition NT-proBNP of <300 pg/ml effectively rules out acute congestive heart failure with 99% negative predictive value.      Troponin [[id number]]  (Normal) Collected: 12/01/22 0907    Specimen: Blood Updated: 12/01/22 0948     Troponin T <0.010 ng/mL     Narrative:      Troponin T Reference Range:  <= 0.03 ng/mL-   Negative for AMI  >0.03 ng/mL-     Abnormal for myocardial necrosis.  Clinicians would have to utilize clinical acumen, EKG, Troponin and serial changes to determine if it is an Acute Myocardial Infarction or myocardial injury due to an underlying chronic condition.       Results may be falsely decreased if patient taking Biotin.      Influenza Antigen, Rapid - Swab, Nasopharynx [[id number]]  (Normal) Collected: 12/01/22 0912    Specimen: Swab from Nasopharynx Updated: 12/01/22 0953     Influenza A Ag, EIA Negative     Influenza B Ag, EIA Negative    Blood Gas, Arterial -With Co-Ox Panel: Yes [[id number]]  (Abnormal) Collected: 12/01/22 1029    Specimen: Arterial Blood Updated: 12/01/22 1033     pH, Arterial 7.384 pH units      pCO2, Arterial 42.1 mm Hg      pO2, Arterial 55.7 mm Hg      HCO3, Arterial 24.6 mmol/L      Base Excess, Arterial -0.5 mmol/L      O2 Saturation, Arterial 90.1 %      Hemoglobin, Blood Gas 14.9 g/dL      Carboxyhemoglobin 1.9 %      Methemoglobin 0.10 %      Oxyhemoglobin 88.3 %      FHHB 9.7 %      Site Arterial: right radial     Modality Room Air     FIO2 21 %      PO2/FIO2 265     Nathanael's Test Positive    CBC Auto Differential [[id number]]  (Abnormal) Collected: 12/01/22  1259    Specimen: Blood Updated: 12/01/22 1312     WBC 6.29 10*3/mm3      RBC 5.00 10*6/mm3      Hemoglobin 13.9 g/dL      Hematocrit 42.8 %      MCV 85.6 fL      MCH 27.8 pg      MCHC 32.5 g/dL      RDW 14.1 %      RDW-SD 44.0 fl      MPV 10.0 fL      Platelets 157 10*3/mm3      Neutrophil % 88.5 %      Lymphocyte % 4.8 %      Monocyte % 6.2 %      Eosinophil % 0.0 %      Basophil % 0.3 %      Immature Grans % 0.2 %      Neutrophils, Absolute 5.57 10*3/mm3      Lymphocytes, Absolute 0.30 10*3/mm3      Monocytes, Absolute 0.39 10*3/mm3      Eosinophils, Absolute 0.00 10*3/mm3      Basophils, Absolute 0.02 10*3/mm3      Immature Grans, Absolute 0.01 10*3/mm3      nRBC 0.0 /100 WBC            Imaging:  XR Chest 1 View    Result Date: 12/1/2022  PROCEDURE: XR CHEST 1 VW  COMPARISON: [facility], CR, XR CHEST 1 VW, 11/23/2021, 6:30.  [facility], CR, XR CHEST 1 VW, 12/06/2021, 15:03.  INDICATIONS: SHORTNESS OF BREATH.  FINDINGS:  The heart is normal in size.  The lungs are well-expanded and free of infiltrates.  Bony structures appear intact.       No active disease is seen.       TAWANNA DIAZ MD       Electronically Signed and Approved By: TAWANNA DIAZ MD on 12/01/2022 at 9:25               Procedures:  Procedures    Progress                            Medical Decision Making:  MDM   67-year-old male patient presents with complaint of shortness of breath.  Patient has history of COPD and continues to smoke.  Patient's work-up was negative for any significant acute abnormalities.  His breathing improved with steroids and breathing treatments.  Patient's blood gas did not show hypercapnia.  His chest x-ray is clear.  Patient is stable for discharge on steroids with Z-Tomasz and albuterol inhaler.          Final diagnoses:   COPD exacerbation (HCC)        Disposition:  ED Disposition     ED Disposition   Discharge    Condition   Stable    Comment   --             This medical record created  using voice recognition software and a virtual scribe.    Documentation assistance provided by Toño Pablo acting as scribe for No att. lori lockett MD. Information recorded by the scribe was done at my direction and has been verified and validated by me.         Toño Pablo  12/01/22 0947       Yuri Patel DO  12/01/22 1518       Yuri Patel DO  12/01/22 1518

## 2023-01-31 ENCOUNTER — HOSPITAL ENCOUNTER (EMERGENCY)
Facility: HOSPITAL | Age: 68
Discharge: HOME | End: 2023-01-31
Attending: EMERGENCY MEDICINE | Admitting: EMERGENCY MEDICINE
Payer: MEDICARE

## 2023-01-31 ENCOUNTER — APPOINTMENT (OUTPATIENT)
Dept: CT IMAGING | Facility: HOSPITAL | Age: 68
End: 2023-01-31
Payer: MEDICARE

## 2023-01-31 ENCOUNTER — APPOINTMENT (OUTPATIENT)
Dept: GENERAL RADIOLOGY | Facility: HOSPITAL | Age: 68
End: 2023-01-31
Payer: MEDICARE

## 2023-01-31 VITALS
BODY MASS INDEX: 27.64 KG/M2 | HEART RATE: 67 BPM | SYSTOLIC BLOOD PRESSURE: 158 MMHG | DIASTOLIC BLOOD PRESSURE: 97 MMHG | HEIGHT: 66 IN | OXYGEN SATURATION: 97 % | WEIGHT: 171.96 LBS | TEMPERATURE: 98.3 F | RESPIRATION RATE: 16 BRPM
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
## 2023-01-31 DIAGNOSIS — R51.9 ACUTE NONINTRACTABLE HEADACHE, UNSPECIFIED HEADACHE TYPE: ICD-10-CM

## 2023-01-31 DIAGNOSIS — M54.6 ACUTE MIDLINE THORACIC BACK PAIN: ICD-10-CM

## 2023-01-31 PROCEDURE — 70450 CT HEAD/BRAIN W/O DYE: CPT

## 2023-01-31 PROCEDURE — 99283 EMERGENCY DEPT VISIT LOW MDM: CPT

## 2023-01-31 PROCEDURE — 72072 X-RAY EXAM THORAC SPINE 3VWS: CPT

## 2024-08-19 ENCOUNTER — APPOINTMENT (OUTPATIENT)
Dept: CT IMAGING | Facility: HOSPITAL | Age: 69
End: 2024-08-19
Payer: MEDICARE

## 2024-08-19 ENCOUNTER — HOSPITAL ENCOUNTER (EMERGENCY)
Facility: HOSPITAL | Age: 69
Discharge: LEFT BEFORE DISCHARGE | End: 2024-08-19
Attending: EMERGENCY MEDICINE | Admitting: EMERGENCY MEDICINE
Payer: MEDICARE

## 2024-08-19 VITALS
RESPIRATION RATE: 17 BRPM | HEART RATE: 64 BPM | DIASTOLIC BLOOD PRESSURE: 109 MMHG | SYSTOLIC BLOOD PRESSURE: 150 MMHG | BODY MASS INDEX: 25.97 KG/M2 | WEIGHT: 161.6 LBS | HEIGHT: 66 IN | OXYGEN SATURATION: 98 % | TEMPERATURE: 98.7 F

## 2024-08-19 DIAGNOSIS — K86.89 PANCREATIC MASS: ICD-10-CM

## 2024-08-19 DIAGNOSIS — R17 JAUNDICE: ICD-10-CM

## 2024-08-19 DIAGNOSIS — R93.5 ABNORMAL CT OF THE ABDOMEN: ICD-10-CM

## 2024-08-19 DIAGNOSIS — R74.8 ELEVATED LIVER ENZYMES: Primary | ICD-10-CM

## 2024-08-19 LAB
ALBUMIN SERPL-MCNC: 3.6 G/DL (ref 3.5–5.2)
ALBUMIN/GLOB SERPL: 1.4 G/DL
ALP SERPL-CCNC: 599 U/L (ref 39–117)
ALT SERPL W P-5'-P-CCNC: 148 U/L (ref 1–41)
ANION GAP SERPL CALCULATED.3IONS-SCNC: 9.3 MMOL/L (ref 5–15)
AST SERPL-CCNC: 199 U/L (ref 1–40)
BACTERIA UR QL AUTO: ABNORMAL /HPF
BASOPHILS # BLD AUTO: 0.03 10*3/MM3 (ref 0–0.2)
BASOPHILS NFR BLD AUTO: 0.4 % (ref 0–1.5)
BILIRUB SERPL-MCNC: 19.9 MG/DL (ref 0–1.2)
BILIRUB UR QL STRIP: ABNORMAL
BUN SERPL-MCNC: 13 MG/DL (ref 8–23)
BUN/CREAT SERPL: 10.2 (ref 7–25)
CALCIUM SPEC-SCNC: 8.6 MG/DL (ref 8.6–10.5)
CHLORIDE SERPL-SCNC: 104 MMOL/L (ref 98–107)
CLARITY UR: CLEAR
CO2 SERPL-SCNC: 22.7 MMOL/L (ref 22–29)
COLOR UR: ABNORMAL
CREAT SERPL-MCNC: 1.28 MG/DL (ref 0.76–1.27)
D-LACTATE SERPL-SCNC: 0.9 MMOL/L (ref 0.5–2)
DEPRECATED RDW RBC AUTO: 64.1 FL (ref 37–54)
EGFRCR SERPLBLD CKD-EPI 2021: 60.2 ML/MIN/1.73
EOSINOPHIL # BLD AUTO: 0.1 10*3/MM3 (ref 0–0.4)
EOSINOPHIL NFR BLD AUTO: 1.5 % (ref 0.3–6.2)
ERYTHROCYTE [DISTWIDTH] IN BLOOD BY AUTOMATED COUNT: 22.4 % (ref 12.3–15.4)
GLOBULIN UR ELPH-MCNC: 2.5 GM/DL
GLUCOSE SERPL-MCNC: 115 MG/DL (ref 65–99)
GLUCOSE UR STRIP-MCNC: NEGATIVE MG/DL
HCT VFR BLD AUTO: 29.3 % (ref 37.5–51)
HGB BLD-MCNC: 10 G/DL (ref 13–17.7)
HGB UR QL STRIP.AUTO: NEGATIVE
HOLD SPECIMEN: NORMAL
HOLD SPECIMEN: NORMAL
HYALINE CASTS UR QL AUTO: ABNORMAL /LPF
IMM GRANULOCYTES # BLD AUTO: 0.06 10*3/MM3 (ref 0–0.05)
IMM GRANULOCYTES NFR BLD AUTO: 0.9 % (ref 0–0.5)
KETONES UR QL STRIP: NEGATIVE
LEUKOCYTE ESTERASE UR QL STRIP.AUTO: ABNORMAL
LIPASE SERPL-CCNC: 6 U/L (ref 13–60)
LYMPHOCYTES # BLD AUTO: 1.59 10*3/MM3 (ref 0.7–3.1)
LYMPHOCYTES NFR BLD AUTO: 23.1 % (ref 19.6–45.3)
MCH RBC QN AUTO: 27.6 PG (ref 26.6–33)
MCHC RBC AUTO-ENTMCNC: 34.1 G/DL (ref 31.5–35.7)
MCV RBC AUTO: 80.9 FL (ref 79–97)
MONOCYTES # BLD AUTO: 0.92 10*3/MM3 (ref 0.1–0.9)
MONOCYTES NFR BLD AUTO: 13.4 % (ref 5–12)
NEUTROPHILS NFR BLD AUTO: 4.19 10*3/MM3 (ref 1.7–7)
NEUTROPHILS NFR BLD AUTO: 60.7 % (ref 42.7–76)
NITRITE UR QL STRIP: NEGATIVE
NRBC BLD AUTO-RTO: 0 /100 WBC (ref 0–0.2)
PH UR STRIP.AUTO: 5.5 [PH] (ref 5–8)
PLAT MORPH BLD: NORMAL
PLATELET # BLD AUTO: 238 10*3/MM3 (ref 140–450)
PMV BLD AUTO: 11.8 FL (ref 6–12)
POTASSIUM SERPL-SCNC: 4.4 MMOL/L (ref 3.5–5.2)
PROT SERPL-MCNC: 6.1 G/DL (ref 6–8.5)
PROT UR QL STRIP: ABNORMAL
RBC # BLD AUTO: 3.62 10*6/MM3 (ref 4.14–5.8)
RBC # UR STRIP: ABNORMAL /HPF
REF LAB TEST METHOD: ABNORMAL
SODIUM SERPL-SCNC: 136 MMOL/L (ref 136–145)
SP GR UR STRIP: 1.01 (ref 1–1.03)
SQUAMOUS #/AREA URNS HPF: ABNORMAL /HPF
TARGETS BLD QL SMEAR: NORMAL
UROBILINOGEN UR QL STRIP: ABNORMAL
WBC # UR STRIP: ABNORMAL /HPF
WBC MORPH BLD: NORMAL
WBC NRBC COR # BLD AUTO: 6.89 10*3/MM3 (ref 3.4–10.8)
WHOLE BLOOD HOLD COAG: NORMAL
WHOLE BLOOD HOLD SPECIMEN: NORMAL

## 2024-08-19 PROCEDURE — 25510000001 IOPAMIDOL PER 1 ML: Performed by: EMERGENCY MEDICINE

## 2024-08-19 PROCEDURE — 83605 ASSAY OF LACTIC ACID: CPT

## 2024-08-19 PROCEDURE — 74177 CT ABD & PELVIS W/CONTRAST: CPT

## 2024-08-19 PROCEDURE — 83690 ASSAY OF LIPASE: CPT

## 2024-08-19 PROCEDURE — 99285 EMERGENCY DEPT VISIT HI MDM: CPT

## 2024-08-19 PROCEDURE — 85025 COMPLETE CBC W/AUTO DIFF WBC: CPT

## 2024-08-19 PROCEDURE — 80053 COMPREHEN METABOLIC PANEL: CPT

## 2024-08-19 PROCEDURE — 81001 URINALYSIS AUTO W/SCOPE: CPT | Performed by: EMERGENCY MEDICINE

## 2024-08-19 PROCEDURE — 36415 COLL VENOUS BLD VENIPUNCTURE: CPT

## 2024-08-19 PROCEDURE — 85007 BL SMEAR W/DIFF WBC COUNT: CPT

## 2024-08-19 RX ORDER — IOPAMIDOL 755 MG/ML
100 INJECTION, SOLUTION INTRAVASCULAR
Status: COMPLETED | OUTPATIENT
Start: 2024-08-19 | End: 2024-08-19

## 2024-08-19 RX ORDER — IOPAMIDOL 755 MG/ML
100 INJECTION, SOLUTION INTRAVASCULAR
Status: DISCONTINUED | OUTPATIENT
Start: 2024-08-19 | End: 2024-08-20 | Stop reason: HOSPADM

## 2024-08-19 RX ORDER — SODIUM CHLORIDE 0.9 % (FLUSH) 0.9 %
10 SYRINGE (ML) INJECTION AS NEEDED
Status: DISCONTINUED | OUTPATIENT
Start: 2024-08-19 | End: 2024-08-20 | Stop reason: HOSPADM

## 2024-08-19 RX ADMIN — IOPAMIDOL 100 ML: 755 INJECTION, SOLUTION INTRAVENOUS at 19:13

## 2024-08-19 NOTE — ED PROVIDER NOTES
Time: 3:50 PM EDT  Date of encounter:  8/19/2024  Independent Historian/Clinical History and Information was obtained by:   Patient    History is limited by: N/A    Chief Complaint   Patient presents with    Jaundice         History of Present Illness:  Patient is a 69 y.o. year old male who presents to the emergency department for evaluation of jaundice. Pt states he has had a yellow tint to his eyes x 1 week.  Patient states for the past week his coworkers have told him that he appears yellow.  Patient states that today became more obvious.  Patient denies any abdominal pain.  Patient denies any physical complaints.  Patient does report history of alcohol use.  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  Patient states has been told he has hepatitis C but was treated for it.      Patient Care Team  Primary Care Provider: Forrest Duong MD    Past Medical History:     Allergies   Allergen Reactions    Aspirin Nausea Only     Past Medical History:   Diagnosis Date    Allergies     Asthma     GERD (gastroesophageal reflux disease)     Hypertension      Past Surgical History:   Procedure Laterality Date    HERNIA REPAIR       No family history on file.    Home Medications:  Prior to Admission medications    Medication Sig Start Date End Date Taking? Authorizing Provider   albuterol sulfate  (90 Base) MCG/ACT inhaler Inhale 2 puffs Every 4 (Four) Hours As Needed for Wheezing. 12/1/22   Yuri Patel DO   amLODIPine (Norvasc) 5 MG tablet Take 5 mg by mouth Daily.    ProviderWilliam MD   azithromycin (Zithromax Z-Tomasz) 250 MG tablet Take 2 tablets by mouth on day 1, then 1 tablet daily on days 2-5 12/1/22   Yuri Patel DO   buprenorphine-naloxone (Suboxone) 8-2 MG film film Place 1 film under the tongue 2 (Two) Times a Day.    William Dias MD   buPROPion XL (WELLBUTRIN XL) 300 MG 24 hr tablet Take 300 mg by mouth Daily.    William Dias MD  "  busPIRone (BUSPAR) 15 MG tablet Take 15 mg by mouth 3 (Three) Times a Day.    William Dias MD   cetirizine (ZyrTEC Allergy) 10 MG tablet Take 10 mg by mouth Daily.    William Dias MD   gabapentin (NEURONTIN) 800 MG tablet Take 800 mg by mouth 3 (Three) Times a Day.    William Dias MD   mirtazapine (REMERON) 15 MG tablet Take 15 mg by mouth Every Night.    William Dias MD   prazosin (MINIPRESS) 1 MG capsule Take 1 mg by mouth Every Night.    William Dias MD   predniSONE (DELTASONE) 50 MG tablet Take 1 tablet by mouth Daily. 12/1/22   Yuri Patel DO   propranolol LA (Inderal LA) 60 MG 24 hr capsule Take 60 mg by mouth Daily.    William Dias MD        Social History:   Social History     Tobacco Use    Smoking status: Every Day     Current packs/day: 0.50     Types: Cigarettes    Smokeless tobacco: Never   Vaping Use    Vaping status: Every Day   Substance Use Topics    Alcohol use: Never    Drug use: Never         Review of Systems:  Review of Systems   Constitutional:  Negative for chills and fever.   HENT:  Negative for congestion, ear pain and sore throat.    Eyes:  Negative for pain.   Respiratory:  Negative for cough, chest tightness and shortness of breath.    Cardiovascular:  Negative for chest pain.   Gastrointestinal:  Negative for abdominal pain, diarrhea, nausea and vomiting.   Genitourinary:  Negative for flank pain and hematuria.   Musculoskeletal:  Negative for joint swelling.   Skin:  Positive for color change. Negative for pallor.   Neurological:  Negative for seizures and headaches.   All other systems reviewed and are negative.       Physical Exam:  BP (!) 150/109   Pulse 64   Temp 98.7 °F (37.1 °C) (Oral)   Resp 17   Ht 167.6 cm (66\")   Wt 73.3 kg (161 lb 9.6 oz)   SpO2 98%   BMI 26.08 kg/m²         Physical Exam   Vital signs were reviewed under triage note.  General appearance - Patient appears well-developed and well-nourished.  " Patient is in no acute distress.  Head - Normocephalic, atraumatic.  Pupils - Equal, round, reactive to light.  Extraocular muscles are intact.  Conjunctiva is significantly icteric.  Nasal - Normal inspection.  No evidence of trauma or epistaxis.  Tympanic membranes - Gray, intact without erythema or retractions.  Oral mucosa - Pink and moist without lesions or erythema.  Uvula is midline.  Chest wall - Atraumatic.  Chest wall is nontender.  There are no vesicular rashes noted.  Neck - Supple.  Trachea was midline.  There is no palpable lymphadenopathy or thyromegaly.  There are no meningeal signs  Lungs - Clear to auscultation and percussion bilaterally.  Heart - Regular rate and rhythm without any murmurs, clicks, or gallops.  Abdomen - Soft.  Bowel sounds are present.  There is no palpable tenderness.  There is no rebound, guarding, or rigidity.  There are no palpable masses.  There are no pulsatile masses.  Back - Spine is straight and midline.  There is no CVA tenderness.  Extremities - Intact x4 with full range of motion.  There is no palpable edema.  Pulses are intact x4 and equal.  Neurologic - Patient is awake, alert, and oriented x3.  Cranial nerves II through XII are grossly intact.  Motor and sensory functions grossly intact.  Cerebellar function was normal.  Integument - There are no rashes.  There are no petechia or purpura lesions noted.  There are no vesicular lesions noted.            Procedures:  Procedures      Medical Decision Making:      Comorbidities that affect care:    History of hepatitis C, asthma, hypertension, GERD.  Patient missed alcohol consumption.    External Notes reviewed:    Hospital Discharge Summary: Hospital discharge summary from 12/10/2021 was reviewed by me.      The following orders were placed and all results were independently analyzed by me:  Orders Placed This Encounter   Procedures    CT Abdomen Pelvis With Contrast    [place] Draw    Comprehensive Metabolic Panel     "Lipase    Urinalysis With Microscopic If Indicated (No Culture) - Urine, Clean Catch    Lactic Acid, Plasma    CBC Auto Differential    Scan Slide    Urinalysis, Microscopic Only - Urine, Clean Catch    Undress & Gown    CBC & Differential    Green Top (Gel)    Lavender Top    Gold Top - SST    Light Blue Top       Medications Given in the Emergency Department:  Medications   iopamidol (ISOVUE-370) 76 % injection 100 mL (100 mL Intravenous Given 8/19/24 1913)        ED Course:    The patient was initially evaluated in the triage area where orders were placed. The patient was later dispositioned by Pradeep Bravo DO.      The patient was advised to stay for completion of workup which includes but is not limited to communication of labs and radiological results, reassessment and plan. The patient was advised that leaving prior to disposition by a provider could result in critical findings that are not communicated to the patient.     ED Course as of 08/23/24 1939   Mon Aug 19, 2024   1729   --- PROVIDER IN TRIAGE NOTE ---    Patient was seen and evaluated in triage by TRISHA Hernandez.  Orders were written and the patient is currently awaiting disposition.   [MS]      ED Course User Index  [MS] Nicolle Ochoa APRN       The patient was seen and evaluated in the ED by me.  The above history and physical examination was performed as documented.  Diagnostic data was obtained.  Results reviewed.  Findings were discussed with the patient.  I explained the findings to the patient and that he may have a very serious and life-threatening condition.  I did discuss case with Dr. Luna who recommended patient go to [facility].  Prior to contacting the GI specialist at [facility] are talked with the patient.  The patient is refusing admission here or at [facility].  Patient reports he has \"some things\" to take care of first.  I explained to him that he may have a very serious and life-threatening " condition and that failure to seek proper treatment could result in permanent disability or even death.  Patient states that he will return tomorrow.  Patient will leave the hospital AGAINST MEDICAL ADVICE.    Labs:    Lab Results (last 24 hours)       ** No results found for the last 24 hours. **             Imaging:    No Radiology Exams Resulted Within Past 24 Hours      Differential Diagnosis and Discussion:      Painless jaundice    All labs were reviewed and interpreted by me.  CT scan radiology impression was interpreted by me.    MDM     Amount and/or Complexity of Data Reviewed  Clinical lab tests: reviewed  Tests in the radiology section of CPT®: reviewed         Critical Care Note: Total Critical Care time of 32 minutes. Total critical care time documented does not include time spent on separately billed procedures for services of nurses or physician assistants. I personally saw and examined the patient. I have reviewed all diagnostic interpretations and treatment plans as written. I was present for the key portions of any procedures performed and the inclusive time noted in any critical care statement. Critical care time includes patient management by me, time spent at the patients bedside,  time to review lab and imaging results, discussing patient care, documentation in the medical record, and time spent with family or caregiver.        Patient Care Considerations:    CONSULT: I considered consulting [facility] gastroenterology, however the patient is refusing to be transferred.      Consultants/Shared Management Plan:    Consultant: I have discussed the case with Dr. Luna who states the patient would be best served by being transferred to [facility].    Social Determinants of Health:    Patient is independent, reliable, and has access to care.       Disposition and Care Coordination:    The patient refused admission and/or transfer.  The patient will sign out AGAINST MEDICAL ADVICE.   Patient reports that he will return to the hospital tomorrow to seek proper medical care at that time.  Patient understands that by leaving he is putting his life at risk.        Final diagnoses:   Elevated liver enzymes   Jaundice   Abnormal CT of the abdomen   Pancreatic mass        ED Disposition       ED Disposition   AMA    Condition   --    Comment   --               This medical record created using voice recognition software.             Pradeep Bravo DO  08/23/24 1939

## 2024-08-20 NOTE — DISCHARGE INSTRUCTIONS
Return to ER after you have your social issues situated.  There is something potentially seriously wrong involving your pancreas and liver and failure to seek appropriate medical therapy could lead to permanent disability or even death.  If you do not return to the ER please follow-up with your primary care provider in 1 to 2 days.  Absolutely no alcohol consumption.

## 2024-09-05 ENCOUNTER — RESEARCH ENCOUNTER (OUTPATIENT)
Dept: OTHER | Facility: OTHER | Age: 69
End: 2024-09-05
Payer: MEDICARE

## 2024-09-05 ENCOUNTER — CONSULT (OUTPATIENT)
Dept: ONCOLOGY | Facility: HOSPITAL | Age: 69
End: 2024-09-05
Payer: MEDICARE

## 2024-09-05 ENCOUNTER — HOSPITAL ENCOUNTER (OUTPATIENT)
Dept: ONCOLOGY | Facility: HOSPITAL | Age: 69
Discharge: HOME | End: 2024-09-05
Payer: MEDICARE

## 2024-09-05 ENCOUNTER — DOCUMENTATION (OUTPATIENT)
Dept: ONCOLOGY | Facility: HOSPITAL | Age: 69
End: 2024-09-05
Payer: MEDICARE

## 2024-09-05 VITALS
RESPIRATION RATE: 18 BRPM | TEMPERATURE: 98.6 F | SYSTOLIC BLOOD PRESSURE: 114 MMHG | OXYGEN SATURATION: 96 % | BODY MASS INDEX: 25.75 KG/M2 | DIASTOLIC BLOOD PRESSURE: 68 MMHG | WEIGHT: 160.2 LBS | HEART RATE: 69 BPM | HEIGHT: 66 IN

## 2024-09-05 DIAGNOSIS — C25.0 MALIGNANT NEOPLASM OF HEAD OF PANCREAS: Primary | ICD-10-CM

## 2024-09-05 PROBLEM — C25.9 MALIGNANT NEOPLASM OF PANCREAS: Status: ACTIVE | Noted: 2024-09-05

## 2024-09-05 LAB
ALBUMIN SERPL-MCNC: 3.6 G/DL (ref 3.5–5.2)
ALBUMIN/GLOB SERPL: 1.2 G/DL
ALP SERPL-CCNC: 194 U/L (ref 39–117)
ALT SERPL W P-5'-P-CCNC: 21 U/L (ref 1–41)
ANION GAP SERPL CALCULATED.3IONS-SCNC: 10.4 MMOL/L (ref 5–15)
AST SERPL-CCNC: 28 U/L (ref 1–40)
BASOPHILS # BLD AUTO: 0.04 10*3/MM3 (ref 0–0.2)
BASOPHILS NFR BLD AUTO: 0.6 % (ref 0–1.5)
BILIRUB SERPL-MCNC: 3.9 MG/DL (ref 0–1.2)
BUN SERPL-MCNC: 15 MG/DL (ref 8–23)
BUN/CREAT SERPL: 10.6 (ref 7–25)
CALCIUM SPEC-SCNC: 8.6 MG/DL (ref 8.6–10.5)
CANCER AG19-9 SERPL-ACNC: 1386 U/ML
CHLORIDE SERPL-SCNC: 110 MMOL/L (ref 98–107)
CO2 SERPL-SCNC: 20.6 MMOL/L (ref 22–29)
CREAT SERPL-MCNC: 1.42 MG/DL (ref 0.76–1.27)
DEPRECATED RDW RBC AUTO: 53.9 FL (ref 37–54)
EGFRCR SERPLBLD CKD-EPI 2021: 53.2 ML/MIN/1.73
EOSINOPHIL # BLD AUTO: 0.16 10*3/MM3 (ref 0–0.4)
EOSINOPHIL NFR BLD AUTO: 2.2 % (ref 0.3–6.2)
ERYTHROCYTE [DISTWIDTH] IN BLOOD BY AUTOMATED COUNT: 16.4 % (ref 12.3–15.4)
GLOBULIN UR ELPH-MCNC: 3.1 GM/DL
GLUCOSE SERPL-MCNC: 100 MG/DL (ref 65–99)
HCT VFR BLD AUTO: 26.9 % (ref 37.5–51)
HGB BLD-MCNC: 8.4 G/DL (ref 13–17.7)
IMM GRANULOCYTES # BLD AUTO: 0.03 10*3/MM3 (ref 0–0.05)
IMM GRANULOCYTES NFR BLD AUTO: 0.4 % (ref 0–0.5)
LYMPHOCYTES # BLD AUTO: 1.75 10*3/MM3 (ref 0.7–3.1)
LYMPHOCYTES NFR BLD AUTO: 24.5 % (ref 19.6–45.3)
MCH RBC QN AUTO: 28.3 PG (ref 26.6–33)
MCHC RBC AUTO-ENTMCNC: 31.2 G/DL (ref 31.5–35.7)
MCV RBC AUTO: 90.6 FL (ref 79–97)
MONOCYTES # BLD AUTO: 0.83 10*3/MM3 (ref 0.1–0.9)
MONOCYTES NFR BLD AUTO: 11.6 % (ref 5–12)
NEUTROPHILS NFR BLD AUTO: 4.33 10*3/MM3 (ref 1.7–7)
NEUTROPHILS NFR BLD AUTO: 60.7 % (ref 42.7–76)
NRBC BLD AUTO-RTO: 0 /100 WBC (ref 0–0.2)
PLATELET # BLD AUTO: 311 10*3/MM3 (ref 140–450)
PMV BLD AUTO: 10.5 FL (ref 6–12)
POTASSIUM SERPL-SCNC: 4.8 MMOL/L (ref 3.5–5.2)
PROT SERPL-MCNC: 6.7 G/DL (ref 6–8.5)
RBC # BLD AUTO: 2.97 10*6/MM3 (ref 4.14–5.8)
SODIUM SERPL-SCNC: 141 MMOL/L (ref 136–145)
WBC NRBC COR # BLD AUTO: 7.14 10*3/MM3 (ref 3.4–10.8)

## 2024-09-05 PROCEDURE — 85025 COMPLETE CBC W/AUTO DIFF WBC: CPT | Performed by: INTERNAL MEDICINE

## 2024-09-05 PROCEDURE — 80053 COMPREHEN METABOLIC PANEL: CPT | Performed by: INTERNAL MEDICINE

## 2024-09-05 PROCEDURE — 86301 IMMUNOASSAY TUMOR CA 19-9: CPT | Performed by: INTERNAL MEDICINE

## 2024-09-05 PROCEDURE — 25010000002 HEPARIN LOCK FLUSH PER 10 UNITS: Performed by: INTERNAL MEDICINE

## 2024-09-05 PROCEDURE — 36591 DRAW BLOOD OFF VENOUS DEVICE: CPT

## 2024-09-05 RX ORDER — HEPARIN SODIUM (PORCINE) LOCK FLUSH IV SOLN 100 UNIT/ML 100 UNIT/ML
500 SOLUTION INTRAVENOUS AS NEEDED
OUTPATIENT
Start: 2024-09-05

## 2024-09-05 RX ORDER — SODIUM CHLORIDE 0.9 % (FLUSH) 0.9 %
20 SYRINGE (ML) INJECTION AS NEEDED
Status: DISCONTINUED | OUTPATIENT
Start: 2024-09-05 | End: 2024-09-06 | Stop reason: HOSPADM

## 2024-09-05 RX ORDER — SODIUM CHLORIDE 0.9 % (FLUSH) 0.9 %
20 SYRINGE (ML) INJECTION AS NEEDED
OUTPATIENT
Start: 2024-09-05

## 2024-09-05 RX ORDER — HEPARIN SODIUM (PORCINE) LOCK FLUSH IV SOLN 100 UNIT/ML 100 UNIT/ML
500 SOLUTION INTRAVENOUS AS NEEDED
Status: DISCONTINUED | OUTPATIENT
Start: 2024-09-05 | End: 2024-09-06 | Stop reason: HOSPADM

## 2024-09-05 RX ORDER — AMOXICILLIN 500 MG/1
1 CAPSULE ORAL 3 TIMES DAILY
COMMUNITY
Start: 2024-09-03

## 2024-09-05 RX ORDER — LIDOCAINE/PRILOCAINE 2.5 %-2.5%
1 CREAM (GRAM) TOPICAL AS NEEDED
Qty: 30 G | Refills: 1 | Status: SHIPPED | OUTPATIENT
Start: 2024-09-05

## 2024-09-05 RX ADMIN — HEPARIN 500 UNITS: 100 SYRINGE at 16:00

## 2024-09-05 RX ADMIN — Medication 20 ML: at 16:00

## 2024-09-05 NOTE — PROGRESS NOTES
Chief Complaint  Pancreatic Cancer    Bernardo Fontenot MD Smith, Forrest Leon MD    Subjective          Josue Stern presents to [facility] for evaluation of recently diagnosed pancreatic cancer.  Patient reports that he noticed 3 or 4 weeks ago that he was having jaundice and dark cola colored urine.  Patient reports this started fairly abruptly.  He denies abdominal pain, nausea or vomiting.  His appetite has been slightly low and he is lost around 10 pounds over the last month.  But because of the new jaundice, he was evaluated and admitted to [facility].  He was seen by Dr. Sims, gastroenterology and underwent ERCP/EUS demonstrating a mass in the head of the pancreas.  Biopsy positive for adenocarcinoma.  He had stent placed with improvement in his jaundice.  He was evaluated by Dr. Fontenot, surgical oncology.  I spoke with him today and reviewed the case.  He feels the patient is resectable but recommends neoadjuvant chemotherapy prior.  He did place a Port-A-Cath for that purpose.  He presents today for discussion of chemotherapy.  He is concerned about the treatments interfering with his ability to work and maintain his finances.    Oncology/Hematology History   Malignant neoplasm of head of pancreas   9/5/2024 Initial Diagnosis    Malignant neoplasm of pancreas     9/5/2024 -  Chemotherapy    OP CENTRAL VENOUS ACCESS DEVICE Access, Care, and Maintenance (CVAD)     9/12/2024 -  Chemotherapy    OP PANCREATIC mFOLFIRINOX (OXALIplatin / Leucovorin / Irinotecan / Fluorouracil CIV)           Current Outpatient Medications on File Prior to Visit   Medication Sig Dispense Refill    albuterol sulfate  (90 Base) MCG/ACT inhaler Inhale 2 puffs Every 4 (Four) Hours As Needed for Wheezing. 18 g 0    amLODIPine (Norvasc) 5 MG tablet Take 1 tablet by mouth Daily.      amoxicillin (AMOXIL) 500 MG capsule Take 1 capsule by mouth 3 times a day.       buprenorphine-naloxone (Suboxone) 8-2 MG film film Place 1 film under the tongue 2 (Two) Times a Day.      buPROPion XL (WELLBUTRIN XL) 300 MG 24 hr tablet Take 1 tablet by mouth Daily.      busPIRone (BUSPAR) 15 MG tablet Take 1 tablet by mouth 3 (Three) Times a Day.      gabapentin (NEURONTIN) 800 MG tablet Take 1 tablet by mouth 3 (Three) Times a Day.      prazosin (MINIPRESS) 1 MG capsule Take 1 capsule by mouth Every Night.      predniSONE (DELTASONE) 50 MG tablet Take 1 tablet by mouth Daily. 5 tablet 0    propranolol LA (Inderal LA) 60 MG 24 hr capsule Take 1 capsule by mouth Daily.      azithromycin (Zithromax Z-Tomasz) 250 MG tablet Take 2 tablets by mouth on day 1, then 1 tablet daily on days 2-5 (Patient not taking: Reported on 9/5/2024) 6 tablet 0    cetirizine (ZyrTEC Allergy) 10 MG tablet Take 10 mg by mouth Daily. (Patient not taking: Reported on 9/5/2024)      mirtazapine (REMERON) 15 MG tablet Take 15 mg by mouth Every Night. (Patient not taking: Reported on 9/5/2024)       No current facility-administered medications on file prior to visit.       Allergies   Allergen Reactions    Aspirin Nausea Only     Past Medical History:   Diagnosis Date    Allergies     Asthma     GERD (gastroesophageal reflux disease)     Hypertension     Malignant neoplasm of pancreas 09/05/2024     Past Surgical History:   Procedure Laterality Date    HERNIA REPAIR       Social History     Socioeconomic History    Marital status: Single   Tobacco Use    Smoking status: Every Day     Current packs/day: 0.50     Types: Cigarettes    Smokeless tobacco: Never   Vaping Use    Vaping status: Every Day   Substance and Sexual Activity    Alcohol use: Never    Drug use: Never    Sexual activity: Defer     History reviewed. No pertinent family history.    Objective   Physical Exam  Vitals reviewed. Exam conducted with a chaperone present.   Constitutional:       Appearance: Normal appearance.   Eyes:      General: Scleral icterus  "present.   Cardiovascular:      Rate and Rhythm: Normal rate and regular rhythm.      Heart sounds: Normal heart sounds. No murmur heard.     No gallop.   Pulmonary:      Effort: Pulmonary effort is normal.      Breath sounds: Normal breath sounds.      Comments: Port-A-Cath  Abdominal:      General: Bowel sounds are normal. There is no distension.      Tenderness: There is no abdominal tenderness.   Musculoskeletal:      Right lower leg: No edema.      Left lower leg: No edema.   Neurological:      Mental Status: He is alert.   Psychiatric:         Mood and Affect: Mood normal.         Behavior: Behavior normal.         Vitals:    09/05/24 1422   BP: 114/68   Pulse: 69   Resp: 18   Temp: 98.6 °F (37 °C)   TempSrc: Temporal   SpO2: 96%   Weight: 72.7 kg (160 lb 3.2 oz)   Height: 167.6 cm (66\")   PainSc:   3               PHQ-9 Total Score:                    Result Review :   The following data was reviewed by: Gene Blanchard MD on 09/05/2024:  Lab Results   Component Value Date    HGB 8.4 (L) 09/05/2024    HCT 26.9 (L) 09/05/2024    MCV 90.6 09/05/2024     09/05/2024    WBC 7.14 09/05/2024    NEUTROABS 4.33 09/05/2024    LYMPHSABS 1.75 09/05/2024    MONOSABS 0.83 09/05/2024    EOSABS 0.16 09/05/2024    BASOSABS 0.04 09/05/2024     Lab Results   Component Value Date    GLUCOSE 100 (H) 09/05/2024    BUN 15 09/05/2024    CREATININE 1.42 (H) 09/05/2024     09/05/2024    K 4.8 09/05/2024     (H) 09/05/2024    CO2 20.6 (L) 09/05/2024    CALCIUM 8.6 09/05/2024    PROTEINTOT 6.7 09/05/2024    ALBUMIN 3.6 09/05/2024    BILITOT 3.9 (H) 09/05/2024    ALKPHOS 194 (H) 09/05/2024    AST 28 09/05/2024    ALT 21 09/05/2024     Lab Results   Component Value Date    MG 1.7 12/10/2021    PHOS 3.4 12/10/2021    FREET4 1.74 06/30/2020    TSH 0.256 (L) 12/06/2021     Lab Results   Component Value Date    IRON 15 (L) 12/08/2021    LABIRON 7 (L) 12/08/2021    TRANSFERRIN 150 (L) 12/08/2021    TIBC 224 (L) 12/08/2021 "     Lab Results   Component Value Date    FERRITIN 75 12/21/2020    AFGSRILH60 1,022 (H) 12/08/2021    FOLATE 10.90 12/08/2021     Lab Results   Component Value Date     1,386.0 (H) 09/05/2024       Data reviewed : Radiologic studies CT abdomen pelvis reviewed .  ERCP records reviewed.  [facility] records reviewed including Dr. Sims gastroenterology notes, Dr. Fontenot, surgical oncology notes, discharge summary.  Pathology report reviewed demonstrating adenocarcinoma the pancreas.       Assessment and Plan    Diagnoses and all orders for this visit:    1. Malignant neoplasm of head of pancreas (Primary)  Assessment & Plan:  Newly diagnosed with adenocarcinoma the pancreatic head.  Case discussed with Dr. Fontenot, surgical oncology who feels that the patient is resectable but recommends neoadjuvant treatment with chemotherapy.  He has had his Port-A-Cath placed.  He is still jaundiced but stent is in place.  I will repeat lab work to reevaluate his liver functions, especially bilirubin.  Also draw CA 19-9.  Based on NCCN guidelines, I would recommend neoadjuvant chemotherapy with FOLFIRINOX regimen consisting of 5-fluorouracil, leucovorin, irinotecan, oxaliplatin given as a 3-day cycle every 2 weeks for up to 8 cycles depending upon response and tolerance.  Side effects, risk and benefits discussed in detail.  Written teaching information provided.  Patient is agreeable to therapy as outlined.  He will have a chemotherapy teaching session scheduled.  Lab work as above to ensure adequate endorgan functions and blood counts.  Prescriptions for Zofran for nausea and Emla cream for port access provided.  He will see our social workers today for discussion of finances/work.  I will see him back for cycle 2-day 1 with lab work prior to monitor for toxicities.    Orders:  -     CBC & Differential; Future  -     Comprehensive Metabolic Panel; Future  -     Cancer Antigen 19-9; Future  -      lidocaine-prilocaine (EMLA) 2.5-2.5 % cream; Apply 1 Application topically to the appropriate area as directed As Needed for Injection Site Pain.  Dispense: 30 g; Refill: 1  -     Provider Communication  -     ondansetron (ZOFRAN) 8 MG tablet; Take 1 tablet by mouth 3 (Three) Times a Day As Needed for Nausea or Vomiting.  Dispense: 30 tablet; Refill: 5        I spent 70 minutes caring for Josue on this date of service. This time includes time spent by me in the following activities:preparing for the visit, reviewing tests, obtaining and/or reviewing a separately obtained history, performing a medically appropriate examination and/or evaluation , counseling and educating the patient/family/caregiver, ordering medications, tests, or procedures, referring and communicating with other health care professionals , documenting information in the medical record, independently interpreting results and communicating that information with the patient/family/caregiver, and care coordination    Patient Follow Up: Cycle 2-day 1    Patient was given instructions and counseling regarding his condition or for health maintenance advice. Please see specific information pulled into the AVS if appropriate.     Gene Blanchard MD    9/6/2024

## 2024-09-05 NOTE — PROGRESS NOTES
Diagnosis: Pancreatic cancer    Reason for Referral: Psychosocial assessment for new oncology patient    Current Tx Plan: Mr. Stern will receive neoadjuvant chemotherapy with FOLFIRINOX regimen consisting of 5-fluorouracil, leucovorin, irinotecan, oxaliplatin given as a 3-day cycle every 2 weeks for up to 8 cycles depending upon response and tolerance, with plans for resection afterwards.    Most Recent Distress Level: 2 (9/5/2024)    Most Recent PHQ -2/PHQ-9 Score: 0 (9/5/2024)    Mental Status: Mr. Stern was very pleasant and openly engaged in conversation with OSW this afternoon.  His cognitive and memory skills appear to be intact, as he was able to provide adequate details to complete his psychosocial needs assessment today.  He was able to communicate his thoughts and feelings, and acknowledges feeling overwhelmed as he processes the abundant amount of information discussed with the oncologist today.  He was also seen by the clinical trials coordinator and has requested more time to consider.  He appeared to be accepting of his diagnosis and treatment plan and was forward thinking throughout our encounter.  He is hopeful to continue working and remain busy throughout his treatment.    Mental Health Treatment: Mr. Stern is prescribed Wellbutrin, BuSpar, and Remeron.  He acknowledges the importance of keeping himself busy to prevent depressive symptoms.    Substance Use/Tobacco Use: Mr. Stern is a current cigarette smoker.  He reports a history of alcohol and drug use.  He was previously residing in [place] and moved to [place] about 4 years ago to get away from his environment.  He reports being successful in his sobriety since moving to [place].  He acknowledges that he needs to keep himself busy to prevent himself from feeling depressed and turning towards substance use.  He is on Suboxone.    Support System: Mr. Stern is 1 of 8 children.  He has 4 living sisters in [place], in which he  receives support from.  His 3 brothers are .  His only child, his son, is also .  Mr. Stern reports having established a support system through some of his coworkers.  He has one of his sisters listed on his verbal release form and informed OSW that he will plan to provide her with OSW's telephone number to contact OSW as needed.    Spirituality: Mr. Stern is of the Protestant naila.    Hobbies: Mr. Stern keeps himself busy working 32 hours a week.  Outside of work, he finds different things to keep himself busy.  He enjoys watching television or going to visit his sisters in [place].    Residential status/Who lives in the home: Mr. Stern resides independently by himself here locally in [place].  He rents his residence.  He hopes to remain living here in [place] to keep himself out of the [place] area, but acknowledges he may need assistance with his housing if he finds he is no longer able to work throughout treatment or loses a substantial amount of hours.     Home Care Needs: No needs identified at this time.  Mr. Stern is currently independent with his ADLs and feels like he is fit for his age.    Insurance: Medicare and [place] Medicaid    Finances: Mr. Stern reports he receives Social Security income and is also gainfully employed at [employer] as a .  He has been employed here for almost a year and works 32 hours a week.  Previously, he worked at [employer] for 3 years.  He is currently able to meet his basic needs and plans to continue working throughout his treatment, however, if he finds that he is unable to to continue working throughout his treatment, he may need financial resources to help with his basic needs.  OSW encouraged Mr. Stern to contact me if he experiences any barriers with his employer or if his financial situation changes and resources are needed.  He v/u and expressed appreciation.    Transportation: Mr. Stern currently provides his own  transportation.  If he needs transportation arranged as treatment proceeds, OSW can assist in getting this arranged through [employer].    Advance Care Planning: No directive on file.    Resources/Referrals: Emotional support, patient navigation and care coordination    Additional Comment: OSW received a referral from the medical oncologist.  OSW met with Mr. Stern in the medical oncology clinic during his consultation this afternoon. Advised that OSW is available to help navigate the healthcare system, address any barriers to care or unmet physical, emotional, social, practical and spiritual needs.  Mr. Stern expressed potential concerns regarding his employment, however, plans to speak with his employer regarding his upcoming chemotherapy treatment.  He is hopeful to continue working throughout his treatment, which the oncologist has approved for him to do if he feels up to it.  Mr. Stern will contact OSW if he experiences any concerns with his employment or with meeting his basic needs.  Mr. Stern provided OSW with a copy of his work schedule for next week.  OSW reviewed this with the chemotherapy , Kenzie BATISTA, and she was able to schedule Mr. Stern for chemotherapy education on Tuesday, 9/10/2024 at 2 PM prior to going into work that day at 4 PM.  Provided Mr. Stern with my business card, encouraging OSW support remains available.  If OSW does not hear from Mr. Stern prior to his first day of treatment, OSW will plan to check in with him then.  He expressed appreciation.

## 2024-09-06 ENCOUNTER — DOCUMENTATION (OUTPATIENT)
Dept: PHARMACY | Facility: HOSPITAL | Age: 69
End: 2024-09-06
Payer: MEDICARE

## 2024-09-06 ENCOUNTER — TELEPHONE (OUTPATIENT)
Dept: ONCOLOGY | Facility: HOSPITAL | Age: 69
End: 2024-09-06
Payer: MEDICARE

## 2024-09-06 RX ORDER — ONDANSETRON 8 MG/1
8 TABLET, FILM COATED ORAL 3 TIMES DAILY PRN
Qty: 30 TABLET | Refills: 5 | Status: SHIPPED | OUTPATIENT
Start: 2024-09-06

## 2024-09-06 NOTE — ASSESSMENT & PLAN NOTE
Newly diagnosed with adenocarcinoma the pancreatic head.  Case discussed with Dr. Fontenot, surgical oncology who feels that the patient is resectable but recommends neoadjuvant treatment with chemotherapy.  He has had his Port-A-Cath placed.  He is still jaundiced but stent is in place.  I will repeat lab work to reevaluate his liver functions, especially bilirubin.  Also draw CA 19-9.  Based on NCCN guidelines, I would recommend neoadjuvant chemotherapy with FOLFIRINOX regimen consisting of 5-fluorouracil, leucovorin, irinotecan, oxaliplatin given as a 3-day cycle every 2 weeks for up to 8 cycles depending upon response and tolerance.  Side effects, risk and benefits discussed in detail.  Written teaching information provided.  Patient is agreeable to therapy as outlined.  He will have a chemotherapy teaching session scheduled.  Lab work as above to ensure adequate endorgan functions and blood counts.  Prescriptions for Zofran for nausea and Emla cream for port access provided.  He will see our social workers today for discussion of finances/work.  I will see him back for cycle 2-day 1 with lab work prior to monitor for toxicities.

## 2024-09-06 NOTE — RESEARCH
"  Patient Name:  Josue Stern  YOB: 1955  Patient Age:  69 y.o.  Patient's Sex:  male    Date of Service:  09/05/2024    Provider:  Dr. Blanchard                     RESEARCH NOTE                  I met with the patient to review the SIENA-CAMPERR (“cfDNA Assay Multicenter Prospective Observational Validation for Early Cancer Detection, Minimal Residual Disease, and Relapse (CAMPERR)\" clinical trial. All 8 elements of consent were reviewed. The patient would like to take the consent home for review and is interested in participation but stated he is overwhelmed. CRC voiced understanding. Provided the patient with my contact information and told him to contact me if he has questions regarding the clinical trial. Patient verbalized understanding.    GIANCARLO CHOUDHARY    "

## 2024-09-06 NOTE — TELEPHONE ENCOUNTER
Diagnosis: Pancreatic cancer    Reason for Referral: Treatment plan    Content of Visit: OSW received a voicemail from Mr. Stern's sister, Alena, reporting Mr. Stern was seen yesterday for consultation and was feeling a little overwhelmed and requested his sister to please contact OSW to find out exactly what is going on.  OSW returned Mr. Stern's sister's phone call this afternoon and reviewed the treatment plan with her.  She verbalized understanding.  She reports Mr. Stern is concerned about affording his monthly living expenses if his income decreases, and she understands that he has already discussed this with OSW and was made aware that OSW can assist him in getting connected to resources if needed.  She acknowledges the importance of him remaining in [place] and not moving back up to [place] if at all possible in order to be most successful in his sobriety.  Alena reports Mr. Stern was not aware that he has [place] Medicaid secondary to Medicare.  She reports he does not have an insurance card for this plan.  OSW provided her with the telephone number to the membership services, in which they may contact and request a card be mailed to him.  OSW also encouraged her to request he be assigned a  to help him navigate any extra benefits that his plan offers.  She verbalized understanding and can help him navigate these benefits.  Offered my assistance if needed.  Alena reports Mr. Stern had picked up some prescriptions from the pharmacy and is unsure when to start these.  OSW consulted with the triage nurse and provided education.  Alena also inquired if she should be present with Mr. Stern during his chemo education visit next Tuesday afternoon.  OSW advised that it is beneficial to have another set of ears to help process the abundant amount of information and education being provided, however, if it is less taxing for her to be present via speaker phone due to living in [place],  then this may be an option as well.  She verbalized understanding.  Lastly, Mr. Stern is inquiring if he can receive a treatment schedule in advance to provide to his employer to work around.  OSW consulted with the chemotherapy , Kenzie BATISTA, and she confirmed that once she's able to schedule his first treatment she will plan to schedule this out about 2 months in advance.  He verbalized understanding and expressed appreciation.  Encouraged OSW support remains available.    Resources/Referrals Provided: Care coordination and patient navigation, navigating insurance benefits

## 2024-09-06 NOTE — PROGRESS NOTES
"PHARMACY C1D1 PRE VISIT ASSESSMENT    Patient will present for C1D1 of oxaliplatin 85 mg/m2 + leucovorin 400 mg/m2 + irinotecan 150 mg/m2 + fluorouracil 2400 mg/m2 CIV over 46 hours Q14D x 12 cycles      69 y.o. male  Estimated body surface area is 1.82 meters squared as calculated from the following:    Height as of 9/5/24: 167.6 cm (66\").    Weight as of 9/5/24: 72.7 kg (160 lb 3.2 oz).    Past Medical History:   Diagnosis Date    Allergies     Asthma     GERD (gastroesophageal reflux disease)     Hypertension     Malignant neoplasm of pancreas 09/05/2024       Oncology/Hematology History   Malignant neoplasm of head of pancreas   9/5/2024 Initial Diagnosis    Malignant neoplasm of pancreas     9/5/2024 -  Chemotherapy    OP CENTRAL VENOUS ACCESS DEVICE Access, Care, and Maintenance (CVAD)     9/12/2024 -  Chemotherapy    OP PANCREATIC mFOLFIRINOX (OXALIplatin / Leucovorin / Irinotecan / Fluorouracil CIV)         ONC Staging:  Pancreatic adenocarcinoma, final stage pending    Relevant Pathology:       Potentially Actionable Mutation Present: NO     Relevant Imaging:    CT Abdomen Pelvis With Contrast    Result Date: 8/19/2024  Hypodensity within the pancreatic head concerning for a pancreatic head mass obstructing the pancreatic and common bile ducts. Additionally there is a cystic appearing lesion of the pancreatic tail. Pancreatic mass protocol MRI of the abdomen  recommended. Electronically Signed: Bro Sanderson MD  8/19/2024 7:47 PM EDT  Workstation ID: [id number]      Current treatment regimen has insurance authorization approval? NO AUTH REQUIRED    Medication treatment plan entered is appropriate per NCCN Guidelines    Pharmacy Follow-up Considerations:   Patient with newly diagnosed pancreatic adenocarcinoma. Evaluated by surgical oncology at [facility] and plan for neoadjuvant mFOLFIRINOX followed by resection. Pharmacy will continue to monitor labs while on treatment and will  patient prior to first " infusion on cycle 1 day 1.     Rahul Brown, PharmD, BCPS  9/6/2024  11:06 EDT

## 2024-09-10 ENCOUNTER — OFFICE VISIT (OUTPATIENT)
Dept: ONCOLOGY | Facility: HOSPITAL | Age: 69
End: 2024-09-10
Payer: MEDICARE

## 2024-09-10 VITALS
DIASTOLIC BLOOD PRESSURE: 74 MMHG | HEART RATE: 76 BPM | SYSTOLIC BLOOD PRESSURE: 129 MMHG | HEIGHT: 66 IN | OXYGEN SATURATION: 100 % | WEIGHT: 159.2 LBS | BODY MASS INDEX: 25.58 KG/M2

## 2024-09-10 DIAGNOSIS — C25.0 MALIGNANT NEOPLASM OF HEAD OF PANCREAS: Primary | ICD-10-CM

## 2024-09-10 DIAGNOSIS — Z71.9 ENCOUNTER FOR EDUCATION: ICD-10-CM

## 2024-09-10 PROCEDURE — 1125F AMNT PAIN NOTED PAIN PRSNT: CPT | Performed by: NURSE PRACTITIONER

## 2024-09-10 PROCEDURE — 99214 OFFICE O/P EST MOD 30 MIN: CPT | Performed by: NURSE PRACTITIONER

## 2024-09-10 PROCEDURE — G0463 HOSPITAL OUTPT CLINIC VISIT: HCPCS | Performed by: NURSE PRACTITIONER

## 2024-09-10 NOTE — PROGRESS NOTES
Chief Complaint/Reason for Referral:  No chief complaint on file.    Bernardo Fontenot MD Smith, Forrest Leon MD    Records Obtained:  Records of the patients history including those obtained from  *** were reviewed and summarized in detail.    Subjective    History of Present Illness    Josue Stern presents to [facility]EMATOLOGY [facility]COLOGY for ***    Cancer Staging   No matching staging information was found for the patient.       Treatment intent: {curative/palliative:58038}    Oncology/Hematology History   Malignant neoplasm of head of pancreas   9/5/2024 Initial Diagnosis    Malignant neoplasm of pancreas     9/5/2024 -  Chemotherapy    OP CENTRAL VENOUS ACCESS DEVICE Access, Care, and Maintenance (CVAD)     9/12/2024 -  Chemotherapy    OP PANCREATIC mFOLFIRINOX (OXALIplatin / Leucovorin / Irinotecan / Fluorouracil CIV)         Review of Systems   Constitutional: Negative.    HENT: Negative.     Eyes: Negative.    Respiratory: Negative.     Cardiovascular: Negative.    Gastrointestinal: Negative.    Endocrine: Negative.    Genitourinary: Negative.    Musculoskeletal: Negative.    Skin: Negative.    Allergic/Immunologic: Negative.    Neurological: Negative.    Hematological: Negative.    Psychiatric/Behavioral: Negative.       Current Outpatient Medications on File Prior to Visit   Medication Sig Dispense Refill    albuterol sulfate  (90 Base) MCG/ACT inhaler Inhale 2 puffs Every 4 (Four) Hours As Needed for Wheezing. 18 g 0    amLODIPine (Norvasc) 5 MG tablet Take 1 tablet by mouth Daily.      amoxicillin (AMOXIL) 500 MG capsule Take 1 capsule by mouth 3 times a day.      azithromycin (Zithromax Z-Tomasz) 250 MG tablet Take 2 tablets by mouth on day 1, then 1 tablet daily on days 2-5 (Patient not taking: Reported on 9/5/2024) 6 tablet 0    buprenorphine-naloxone (Suboxone) 8-2 MG film film Place 1 film under the tongue 2 (Two) Times a Day.      buPROPion XL (WELLBUTRIN XL)  300 MG 24 hr tablet Take 1 tablet by mouth Daily.      busPIRone (BUSPAR) 15 MG tablet Take 1 tablet by mouth 3 (Three) Times a Day.      cetirizine (ZyrTEC Allergy) 10 MG tablet Take 10 mg by mouth Daily. (Patient not taking: Reported on 9/5/2024)      gabapentin (NEURONTIN) 800 MG tablet Take 1 tablet by mouth 3 (Three) Times a Day.      lidocaine-prilocaine (EMLA) 2.5-2.5 % cream Apply 1 Application topically to the appropriate area as directed As Needed for Injection Site Pain. 30 g 1    mirtazapine (REMERON) 15 MG tablet Take 15 mg by mouth Every Night. (Patient not taking: Reported on 9/5/2024)      ondansetron (ZOFRAN) 8 MG tablet Take 1 tablet by mouth 3 (Three) Times a Day As Needed for Nausea or Vomiting. 30 tablet 5    prazosin (MINIPRESS) 1 MG capsule Take 1 capsule by mouth Every Night.      predniSONE (DELTASONE) 50 MG tablet Take 1 tablet by mouth Daily. 5 tablet 0    propranolol LA (Inderal LA) 60 MG 24 hr capsule Take 1 capsule by mouth Daily.       No current facility-administered medications on file prior to visit.       Allergies   Allergen Reactions    Aspirin Nausea Only     Past Medical History:   Diagnosis Date    Allergies     Asthma     GERD (gastroesophageal reflux disease)     Hypertension     Malignant neoplasm of pancreas 09/05/2024     Past Surgical History:   Procedure Laterality Date    HERNIA REPAIR       Social History     Socioeconomic History    Marital status: Single   Tobacco Use    Smoking status: Every Day     Current packs/day: 0.50     Types: Cigarettes    Smokeless tobacco: Never   Vaping Use    Vaping status: Every Day   Substance and Sexual Activity    Alcohol use: Never    Drug use: Never    Sexual activity: Defer     No family history on file.  Immunization History   Administered Date(s) Administered    COVID-19 (MODERNA) 1st,2nd,3rd Dose Monovalent 04/19/2021, 05/17/2021    COVID-19 (MODERNA) BIVALENT 12+YRS 12/20/2022    COVID-19 (MODERNA) Monovalent Original Booster  11/17/2021       Tobacco Use: High Risk (9/5/2024)    Patient History     Smoking Tobacco Use: Every Day     Smokeless Tobacco Use: Never     Passive Exposure: Not on file       Objective     Physical Exam    There were no vitals filed for this visit.    Wt Readings from Last 3 Encounters:   09/05/24 72.7 kg (160 lb 3.2 oz)   08/19/24 73.3 kg (161 lb 9.6 oz)   01/31/23 78 kg (171 lb 15.3 oz)        {BMI is >= 25 and <30. (Overweight) The following options were offered after discussion; (Optional):97941}                 ECOG: {findings; ecog performance status:15064}  Fall Risk Assessment was completed, and patient is at {LOW/MODERATE/HIGH:40942} risk for falls.  PHQ-9 Total Score: 0       The patient {is/is not:43534}  experiencing fatigue. Fatigue score: {0-10:41010}    PT/OT Functional Screening: {PT fx screen (Optional):14708}  Speech Functional Screening: {Speech fx screen (Optional):18700}  Rehab to be ordered: {Rehab to be ordered (Optional):32784}        Result Review :  The following data was reviewed by: Andrea Delgado MA on 09/10/2024:  Lab Results   Component Value Date    HGB 8.4 (L) 09/05/2024    HCT 26.9 (L) 09/05/2024    MCV 90.6 09/05/2024     09/05/2024    WBC 7.14 09/05/2024    NEUTROABS 4.33 09/05/2024    LYMPHSABS 1.75 09/05/2024    MONOSABS 0.83 09/05/2024    EOSABS 0.16 09/05/2024    BASOSABS 0.04 09/05/2024     Lab Results   Component Value Date    GLUCOSE 100 (H) 09/05/2024    BUN 15 09/05/2024    CREATININE 1.42 (H) 09/05/2024     09/05/2024    K 4.8 09/05/2024     (H) 09/05/2024    CO2 20.6 (L) 09/05/2024    CALCIUM 8.6 09/05/2024    PROTEINTOT 6.7 09/05/2024    ALBUMIN 3.6 09/05/2024    BILITOT 3.9 (H) 09/05/2024    ALKPHOS 194 (H) 09/05/2024    AST 28 09/05/2024    ALT 21 09/05/2024     Lab Results   Component Value Date    FERRITIN 75 12/21/2020    UIQKHSKU77 1,022 (H) 12/08/2021    FOLATE 10.90 12/08/2021     Lab Results   Component Value Date    IRON 15 (L)  12/08/2021    LABIRON 7 (L) 12/08/2021    TRANSFERRIN 150 (L) 12/08/2021    TIBC 224 (L) 12/08/2021     Lab Results   Component Value Date    FERRITIN 75 12/21/2020    UMMRWAYJ05 1,022 (H) 12/08/2021    FOLATE 10.90 12/08/2021     Lab Results   Component Value Date     1,386.0 (H) 09/05/2024       CT Abdomen Pelvis With Contrast    Result Date: 8/19/2024  Hypodensity within the pancreatic head concerning for a pancreatic head mass obstructing the pancreatic and common bile ducts. Additionally there is a cystic appearing lesion of the pancreatic tail. Pancreatic mass protocol MRI of the abdomen  recommended. Electronically Signed: Bro Sanderson MD  8/19/2024 7:47 PM EDT  Workstation ID: [id number]           Assessment and Plan:  There are no diagnoses linked to this encounter.    {Time Spent (Optional):68366}    Patient Follow Up: ***  Patient was given instructions and counseling regarding his condition or for health maintenance advice. Please see specific information pulled into the AVS if appropriate.     Andrea Delgado MA    9/10/2024    .tob

## 2024-09-11 NOTE — PROGRESS NOTES
CHEMOTHERAPY PREPARATION    Josue Stern  [id number]  1955    Chief Complaint:   Chemo Education    History of present illness:  Josue Stern is a 69 y.o. year old male who is here today for chemotherapy preparation and needs assessment.  The patient has been diagnosed with pancreatic cancer and is scheduled to begin treatment with  FOLFIRINOX ( Oxaliplatin, Leucovorin, Irinotecan, home infusion 5FU) every 14 days x 12 cycles.     Oncology History:    Oncology/Hematology History   Malignant neoplasm of head of pancreas   9/5/2024 Initial Diagnosis    Malignant neoplasm of pancreas     9/5/2024 -  Chemotherapy    OP CENTRAL VENOUS ACCESS DEVICE Access, Care, and Maintenance (CVAD)     9/12/2024 -  Chemotherapy    OP PANCREATIC mFOLFIRINOX (OXALIplatin / Leucovorin / Irinotecan / Fluorouracil CIV)         The current medication list and allergy list were reviewed and reconciled.     Past Medical History, Past Surgical History, Social History, Family History have been reviewed and are without significant changes except as mentioned.    Review of Systems:    Review of Systems   Constitutional:  Positive for fatigue.   HENT: Negative.     Eyes: Negative.    Respiratory: Negative.     Gastrointestinal: Negative.    Endocrine: Negative.    Musculoskeletal: Negative.    Allergic/Immunologic: Negative.    Neurological: Negative.    Hematological: Negative.    Psychiatric/Behavioral: Negative.         Physical Exam:    Physical Exam     General: well appearing, in no acute distress  Cardiovascular: regular rate and rhythm, no murmurs noted  Lungs: clear to auscultation bilaterally, respirations even and unlabored  Extremities: no lower extremity edema  Skin: no rashes, lesions, bruising, or petechiae  Psych: Mood is stable    Vitals:    09/10/24 1411   BP: 129/74   Pulse: 76   SpO2: 100%     Vitals:    09/10/24 1411   PainSc:   4   PainLoc: Abdomen          ECOG: {findings; ecog performance  status:91212            NEEDS ASSESSMENTS    VAD Assessment  The patient and I discussed planned intravenous chemotherapy as well as other IV treatments that are often needed throughout the course of treatment. These may include, but are not limited to blood transfusions, antibiotics, and IV hydration. The vasculature does not appear to be adequate for multiple peripheral IVs throughout their treatment course. Discussed risks and benefits of VADs. The patient would like to pursue Port-A-Cath insertion prior to initiation of treatment.       Palliative Care  The patient and I discussed palliative care services. Palliative care is not the same as Hospice care. This is specialized medical care for people living with serious illness with the goal of improving quality of life for the patient and their family. [facility] has partnered with [facility] to offer our patients outpatient palliative care early along with their treatment to assist in coordination of care, symptom management, pain management, and medical decision making.  Oncology criteria for palliative care referral is  not met at this time. The patient is not interested in a palliative care consultation.     Additional Referral needs  none      CHEMOTHERAPY EDUCATION    Booklets Given: Chemotherapy and You [x]  Eating Hints [x]    Sexuality/Fertility Books []      Chemotherapy/Biotherapy Education Sheets: (list all that apply)  nausea management, acid reflux management, diarrhea management, Cancer resourse contacts information, skin and mouth care, and vaccination information                                                                                                                                                                 Chemotherapy Regimen:   Treatment Plans       Name Type Plan Dates Plan Provider         Active    OP PANCREATIC mFOLFIRINOX (OXALIplatin / Leucovorin / Irinotecan / Fluorouracil CIV) ONCOLOGY TREATMENT  9/11/2024 - Present Gene KONG  MD Lo                      TOPICS EDUCATION PROVIDED COMMENTS   ANEMIA:  role of RBC, cause, s/s, ways to manage, role of transfusion [x]    THROMBOCYTOPENIA:  role of platelet, cause, s/s, ways to prevent bleeding, things to avoid, when to seek help [x]    NEUTROPENIA:  role of WBC, cause, infection precautions, s/s of infection, when to call MD [x]    NUTRITION & APPETITE CHANGES:  importance of maintaining healthy diet & weight, ways to manage to improve intake, dietary consult, exercise regimen [x]    DIARRHEA:  causes, s/s of dehydration, ways to manage, dietary changes, when to call MD [x]    CONSTIPATION:  causes, ways to manage, dietary changes, when to call MD [x]    NAUSEA & VOMITING:  cause, use of antiemetics, dietary changes, when to call MD [x]    MOUTH SORES:  causes, oral care, ways to manage [x]    ALOPECIA:  cause, ways to manage, resources [x]    INFERTILITY & SEXUALITY:  causes, fertility preservation options, sexuality changes, ways to manage, importance of birth control [x]    NERVOUS SYSTEM CHANGES:  causes, s/s, neuropathies, cognitive changes, ways to manage [x]    PAIN:  causes, ways to manage [x]    SKIN & NAIL CHANGES:  cause, s/s, ways to manage [x]    ORGAN TOXICITIES:  cause, s/s, need for diagnostic tests, labs, when to notify MD [x]    SURVIVORSHIP:  distress, distress assessment, secondary malignancies, early/late effects, follow-up, social issues, social support []    HOME CARE:  use of spill kits, storing of PO chemo, how to manage bodily fluids [x]    MISCELLANEOUS:  drug interactions, administration, vesicant, et [x]        Assessment and Plan:    Diagnoses and all orders for this visit:    1. Malignant neoplasm of head of pancreas (Primary)    2. Encounter for education      No start date yet. Our office will call once start date is available. He is planning to still continue to work during his treatments.     Eat a light breakfast prior to coming for chemotherapy. Take  morning meds as directed. Dress comfortably. Arrive 15 minutes prior to scheduled time. May have 1 visitor stay with you during treatment.     Discussed nausea meds. Discussed how to use Emla cream. Patient has meds at home.     Discussed how to contact our offices through the triage line for questions and how to call the on call service.     The patient, his sister and I have reviewed their cancer diagnosis and scheduled treatment plan. Needs assessment was completed including genetics, psychosocial needs, barriers to care, VAD evaluation, advanced care planning, and palliative care services. Referrals have been ordered as appropriate based upon our evaluation and patient desires.     Chemotherapy teaching was also completed today as documented above. Adequate time was given to answer all questions to his satisfaction. Patient and family are aware of their care team members and contact information if they have questions or problems throughout the treatment course. The patient is adequately prepared to begin treatment as scheduled.     Reviewed with patient education regarding EMLA cream, dexamethasone and Zofran prescriptions sent to pharmacy.       I advised the patient that he can take Tylenol or ibuprofen as needed for aches/pains related to cancer/treatment.  I also advised that his can use Senokot or MiraLAX as needed for constipation or Imodium as needed for diarrhea.       I reviewed with the patient the care team members. I also reviewed the option of the urgent care clinic through our oncology office for evaluation and management of symptoms related to treatment.    I spent 45 minutes caring for Josue on this date of service. This time includes time spent by me in the following activities: preparing for the visit, reviewing tests, performing a medically appropriate examination and/or evaluation, counseling and educating the patient/family/caregiver, ordering medications, tests, or procedures, referring  and communicating with other health care professionals, documenting information in the medical record, and independently interpreting results and communicating that information with the patient/family/caregiver.     Mayra Gupta, APRN

## 2024-09-11 NOTE — PROGRESS NOTES
CHEMOTHERAPY PREPARATION    Josue Stern  [id number]  1955    Chief Complaint:   Chemo Education    History of present illness:  Josue Stern is a 69 y.o. year old male who is here today for chemotherapy preparation and needs assessment.  The patient has been diagnosed with *** and is scheduled to begin treatment with ***.     Oncology History:    Oncology/Hematology History   Malignant neoplasm of head of pancreas   9/5/2024 Initial Diagnosis    Malignant neoplasm of pancreas     9/5/2024 -  Chemotherapy    OP CENTRAL VENOUS ACCESS DEVICE Access, Care, and Maintenance (CVAD)     9/12/2024 -  Chemotherapy    OP PANCREATIC mFOLFIRINOX (OXALIplatin / Leucovorin / Irinotecan / Fluorouracil CIV)         The current medication list and allergy list were reviewed and reconciled.     Past Medical History, Past Surgical History, Social History, Family History have been reviewed and are without significant changes except as mentioned.    Review of Systems:    Review of Systems    Physical Exam:    Physical Exam     General: well appearing, in no acute distress  Cardiovascular: regular rate and rhythm, no murmurs noted  Lungs: clear to auscultation bilaterally, respirations even and unlabored  Extremities: no lower extremity edema  Skin: no rashes, lesions, bruising, or petechiae  Psych: Mood is stable    Vitals:    09/10/24 1411   BP: 129/74   Pulse: 76   SpO2: 100%     Vitals:    09/10/24 1411   PainSc:   4   PainLoc: Abdomen          ECOG: {findings; ecog performance status:38778            NEEDS ASSESSMENTS    VAD Assessment  The patient and I discussed planned intravenous chemotherapy as well as other IV treatments that are often needed throughout the course of treatment. These may include, but are not limited to blood transfusions, antibiotics, and IV hydration. The vasculature {DOES/DOES NOT:50697} appear to be adequate for multiple peripheral IVs throughout their treatment course. Discussed risks  "and benefits of VADs. The patient {would/not:34771} like to pursue Port-A-Cath insertion prior to initiation of treatment.       Palliative Care  The patient and I discussed palliative care services. Palliative care is not the same as Hospice care. This is specialized medical care for people living with serious illness with the goal of improving quality of life for the patient and their family. [facility] has partnered with [facility] to offer our patients outpatient palliative care early along with their treatment to assist in coordination of care, symptom management, pain management, and medical decision making.  Oncology criteria for palliative care referral {is/is not:83747} met at this time. The patient {is/is not:42165} interested in a palliative care consultation.     Additional Referral needs  {NONE:23499}      CHEMOTHERAPY EDUCATION    Booklets Given: Chemotherapy and You [x]  Eating Hints [x]    Sexuality/Fertility Books []      Chemotherapy/Biotherapy Education Sheets: (list all that apply)  {Chemo edu handouts:51607::\"nausea management\",\"acid reflux management\",\"diarrhea management\",\"Cancer resourse contacts information\",\"skin and mouth care\",\"vaccination information\"}                                                                                                                                                                 Chemotherapy Regimen:   Treatment Plans       Name Type Plan Dates Plan Provider         Active    OP PANCREATIC mFOLFIRINOX (OXALIplatin / Leucovorin / Irinotecan / Fluorouracil CIV) ONCOLOGY TREATMENT  9/11/2024 - Present Gene Blanchard MD                      TOPICS EDUCATION PROVIDED COMMENTS   ANEMIA:  role of RBC, cause, s/s, ways to manage, role of transfusion [x]    THROMBOCYTOPENIA:  role of platelet, cause, s/s, ways to prevent bleeding, things to avoid, when to seek help [x]    NEUTROPENIA:  role of WBC, cause, infection precautions, s/s of infection, when to call MD [x]  "   NUTRITION & APPETITE CHANGES:  importance of maintaining healthy diet & weight, ways to manage to improve intake, dietary consult, exercise regimen [x]    DIARRHEA:  causes, s/s of dehydration, ways to manage, dietary changes, when to call MD [x]    CONSTIPATION:  causes, ways to manage, dietary changes, when to call MD [x]    NAUSEA & VOMITING:  cause, use of antiemetics, dietary changes, when to call MD [x]    MOUTH SORES:  causes, oral care, ways to manage [x]    ALOPECIA:  cause, ways to manage, resources [x]    INFERTILITY & SEXUALITY:  causes, fertility preservation options, sexuality changes, ways to manage, importance of birth control [x]    NERVOUS SYSTEM CHANGES:  causes, s/s, neuropathies, cognitive changes, ways to manage [x]    PAIN:  causes, ways to manage [x]    SKIN & NAIL CHANGES:  cause, s/s, ways to manage [x]    ORGAN TOXICITIES:  cause, s/s, need for diagnostic tests, labs, when to notify MD [x]    SURVIVORSHIP:  distress, distress assessment, secondary malignancies, early/late effects, follow-up, social issues, social support [x]    HOME CARE:  use of spill kits, storing of PO chemo, how to manage bodily fluids [x]    MISCELLANEOUS:  drug interactions, administration, vesicant, et [x]        Assessment and Plan:    There are no diagnoses linked to this encounter.      The patient and I have reviewed their cancer diagnosis and scheduled treatment plan. Needs assessment was completed including genetics, psychosocial needs, barriers to care, VAD evaluation, advanced care planning, and palliative care services. Referrals have been ordered as appropriate based upon our evaluation and patient desires.     Chemotherapy teaching was also completed today as documented above. Adequate time was given to answer all questions to his satisfaction. Patient and family are aware of their care team members and contact information if they have questions or problems throughout the treatment course. The patient  is adequately prepared to begin treatment as scheduled.     Reviewed with patient education regarding EMLA cream, dexamethasone and Zofran prescriptions sent to pharmacy.       I advised the patient that *** can take Tylenol or ibuprofen as needed for aches/pains related to cancer/treatment.  I also advised that his can use Senokot or MiraLAX as needed for constipation or Imodium as needed for diarrhea.       I reviewed with the patient the care team members. I also reviewed the option of the urgent care clinic through our oncology office for evaluation and management of symptoms related to treatment.    I spent *** minutes caring for Josue on this date of service. This time includes time spent by me in the following activities: {2021TIMEACTIVITIES:47724}.     Mayra Gupta, APRN

## 2024-09-13 ENCOUNTER — TELEPHONE (OUTPATIENT)
Dept: ONCOLOGY | Facility: HOSPITAL | Age: 69
End: 2024-09-13
Payer: MEDICARE

## 2024-09-13 NOTE — TELEPHONE ENCOUNTER
Diagnosis: Pancreatic cancer    Reason for Referral: Treatment schedule    Content of Visit: [employer] received a voicemail for Mr. Stern requesting a call back.  OSW returned Mr. Stern's phone call this afternoon.  He inquired about his treatment schedule so that he may provide this to his employer to work around.  OSW consulted with the medical oncology infusion charge nurse, Farhana PENA, who advised that the chemotherapy , Kenzie LYNNE, is currently out of the office until Wednesday.  The charge nurse advised that Mr. Stern is scheduled to begin treatment on Tuesday, 9/17/2024 at 8 AM with an arrival time of 7:45 AM.  He will then come in sometime Thursday afternoon for his unhook.  The charge nurse advised that she should be able to keep Mr. Stern on the schedule at 8 AM every other Tuesday until the Thanksgiving holiday.  OSW relayed this to Mr. Stern and will provide him with something in writing to give to his employer in the interim until the chemotherapy  returns to the office and can produce additional appointment dates.  He verbalized understanding and expressed appreciation.  [employer] support remains available.    Resources/Referrals Provided: Patient navigation, coordinating treatment schedule to accommodate employment

## 2024-09-17 ENCOUNTER — DOCUMENTATION (OUTPATIENT)
Dept: ONCOLOGY | Facility: HOSPITAL | Age: 69
End: 2024-09-17
Payer: MEDICARE

## 2024-09-17 ENCOUNTER — DOCUMENTATION (OUTPATIENT)
Dept: NUTRITION | Facility: HOSPITAL | Age: 69
End: 2024-09-17
Payer: MEDICARE

## 2024-09-17 ENCOUNTER — HOSPITAL ENCOUNTER (OUTPATIENT)
Dept: ONCOLOGY | Facility: HOSPITAL | Age: 69
Discharge: HOME | End: 2024-09-17
Admitting: INTERNAL MEDICINE
Payer: MEDICARE

## 2024-09-17 VITALS — HEART RATE: 73 BPM | TEMPERATURE: 98.3 F

## 2024-09-17 DIAGNOSIS — C25.0 MALIGNANT NEOPLASM OF HEAD OF PANCREAS: Primary | ICD-10-CM

## 2024-09-17 LAB
ALBUMIN SERPL-MCNC: 3.8 G/DL (ref 3.5–5.2)
ALBUMIN/GLOB SERPL: 1.6 G/DL
ALP SERPL-CCNC: 151 U/L (ref 39–117)
ALT SERPL W P-5'-P-CCNC: 24 U/L (ref 1–41)
ANION GAP SERPL CALCULATED.3IONS-SCNC: 6.6 MMOL/L (ref 5–15)
AST SERPL-CCNC: 36 U/L (ref 1–40)
BASOPHILS # BLD AUTO: 0.02 10*3/MM3 (ref 0–0.2)
BASOPHILS NFR BLD AUTO: 0.3 % (ref 0–1.5)
BILIRUB SERPL-MCNC: 2.2 MG/DL (ref 0–1.2)
BUN SERPL-MCNC: 9 MG/DL (ref 8–23)
BUN/CREAT SERPL: 9.7 (ref 7–25)
CALCIUM SPEC-SCNC: 8.6 MG/DL (ref 8.6–10.5)
CHLORIDE SERPL-SCNC: 110 MMOL/L (ref 98–107)
CO2 SERPL-SCNC: 25.4 MMOL/L (ref 22–29)
CREAT SERPL-MCNC: 0.93 MG/DL (ref 0.76–1.27)
DEPRECATED RDW RBC AUTO: 51.6 FL (ref 37–54)
EGFRCR SERPLBLD CKD-EPI 2021: 88.3 ML/MIN/1.73
EOSINOPHIL # BLD AUTO: 0.15 10*3/MM3 (ref 0–0.4)
EOSINOPHIL NFR BLD AUTO: 2.5 % (ref 0.3–6.2)
ERYTHROCYTE [DISTWIDTH] IN BLOOD BY AUTOMATED COUNT: 15.3 % (ref 12.3–15.4)
GLOBULIN UR ELPH-MCNC: 2.4 GM/DL
GLUCOSE SERPL-MCNC: 129 MG/DL (ref 65–99)
HCT VFR BLD AUTO: 30 % (ref 37.5–51)
HGB BLD-MCNC: 9.7 G/DL (ref 13–17.7)
IMM GRANULOCYTES # BLD AUTO: 0.01 10*3/MM3 (ref 0–0.05)
IMM GRANULOCYTES NFR BLD AUTO: 0.2 % (ref 0–0.5)
LYMPHOCYTES # BLD AUTO: 1.29 10*3/MM3 (ref 0.7–3.1)
LYMPHOCYTES NFR BLD AUTO: 21.6 % (ref 19.6–45.3)
MCH RBC QN AUTO: 29.2 PG (ref 26.6–33)
MCHC RBC AUTO-ENTMCNC: 32.3 G/DL (ref 31.5–35.7)
MCV RBC AUTO: 90.4 FL (ref 79–97)
MONOCYTES # BLD AUTO: 0.65 10*3/MM3 (ref 0.1–0.9)
MONOCYTES NFR BLD AUTO: 10.9 % (ref 5–12)
NEUTROPHILS NFR BLD AUTO: 3.86 10*3/MM3 (ref 1.7–7)
NEUTROPHILS NFR BLD AUTO: 64.5 % (ref 42.7–76)
PLATELET # BLD AUTO: 312 10*3/MM3 (ref 140–450)
PMV BLD AUTO: 9.9 FL (ref 6–12)
POTASSIUM SERPL-SCNC: 4.5 MMOL/L (ref 3.5–5.2)
PROT SERPL-MCNC: 6.2 G/DL (ref 6–8.5)
RBC # BLD AUTO: 3.32 10*6/MM3 (ref 4.14–5.8)
SODIUM SERPL-SCNC: 142 MMOL/L (ref 136–145)
WBC NRBC COR # BLD AUTO: 5.98 10*3/MM3 (ref 3.4–10.8)

## 2024-09-17 PROCEDURE — 25010000002 LEUCOVORIN CALCIUM PER 50 MG: Performed by: INTERNAL MEDICINE

## 2024-09-17 PROCEDURE — 25010000002 ATROPINE SULFATE 0.4 MG/ML SOLUTION: Performed by: INTERNAL MEDICINE

## 2024-09-17 PROCEDURE — 96417 CHEMO IV INFUS EACH ADDL SEQ: CPT

## 2024-09-17 PROCEDURE — 80053 COMPREHEN METABOLIC PANEL: CPT | Performed by: INTERNAL MEDICINE

## 2024-09-17 PROCEDURE — 25010000002 DEXAMETHASONE SODIUM PHOSPHATE 120 MG/30ML SOLUTION: Performed by: INTERNAL MEDICINE

## 2024-09-17 PROCEDURE — 85025 COMPLETE CBC W/AUTO DIFF WBC: CPT | Performed by: INTERNAL MEDICINE

## 2024-09-17 PROCEDURE — 25010000002 PALONOSETRON PER 25 MCG: Performed by: INTERNAL MEDICINE

## 2024-09-17 PROCEDURE — 96368 THER/DIAG CONCURRENT INF: CPT

## 2024-09-17 PROCEDURE — 0 DEXTROSE 5 % SOLUTION 250 ML FLEX CONT: Performed by: INTERNAL MEDICINE

## 2024-09-17 PROCEDURE — 96413 CHEMO IV INFUSION 1 HR: CPT

## 2024-09-17 PROCEDURE — 25010000002 FLUOROURACIL PER 500 MG: Performed by: INTERNAL MEDICINE

## 2024-09-17 PROCEDURE — 96366 THER/PROPH/DIAG IV INF ADDON: CPT

## 2024-09-17 PROCEDURE — 0 DEXTROSE 5 % SOLUTION 500 ML FLEX CONT: Performed by: INTERNAL MEDICINE

## 2024-09-17 PROCEDURE — 0 DEXTROSE 5 % SOLUTION: Performed by: INTERNAL MEDICINE

## 2024-09-17 PROCEDURE — G0498 CHEMO EXTEND IV INFUS W/PUMP: HCPCS

## 2024-09-17 PROCEDURE — 96415 CHEMO IV INFUSION ADDL HR: CPT

## 2024-09-17 PROCEDURE — 96375 TX/PRO/DX INJ NEW DRUG ADDON: CPT

## 2024-09-17 PROCEDURE — 25010000002 OXALIPLATIN PER 0.5 MG: Performed by: INTERNAL MEDICINE

## 2024-09-17 PROCEDURE — 25010000002 IRINOTECAN PER 20 MG: Performed by: INTERNAL MEDICINE

## 2024-09-17 RX ORDER — DEXTROSE MONOHYDRATE 50 MG/ML
20 INJECTION, SOLUTION INTRAVENOUS ONCE
Status: CANCELLED | OUTPATIENT
Start: 2024-09-17

## 2024-09-17 RX ORDER — FAMOTIDINE 10 MG/ML
20 INJECTION, SOLUTION INTRAVENOUS AS NEEDED
Status: CANCELLED | OUTPATIENT
Start: 2024-09-17

## 2024-09-17 RX ORDER — FAMOTIDINE 10 MG/ML
20 INJECTION, SOLUTION INTRAVENOUS AS NEEDED
Status: DISCONTINUED | OUTPATIENT
Start: 2024-09-17 | End: 2024-09-18 | Stop reason: HOSPADM

## 2024-09-17 RX ORDER — ATROPINE SULFATE 1 MG/ML
0.25 INJECTION, SOLUTION INTRAMUSCULAR; INTRAVENOUS; SUBCUTANEOUS
Status: CANCELLED | OUTPATIENT
Start: 2024-09-17

## 2024-09-17 RX ORDER — DEXTROSE MONOHYDRATE 50 MG/ML
20 INJECTION, SOLUTION INTRAVENOUS ONCE
Status: COMPLETED | OUTPATIENT
Start: 2024-09-17 | End: 2024-09-17

## 2024-09-17 RX ORDER — DIPHENHYDRAMINE HYDROCHLORIDE 50 MG/ML
50 INJECTION INTRAMUSCULAR; INTRAVENOUS AS NEEDED
Status: CANCELLED | OUTPATIENT
Start: 2024-09-17

## 2024-09-17 RX ORDER — PALONOSETRON 0.05 MG/ML
0.25 INJECTION, SOLUTION INTRAVENOUS ONCE
Status: CANCELLED | OUTPATIENT
Start: 2024-09-17

## 2024-09-17 RX ORDER — PALONOSETRON 0.05 MG/ML
0.25 INJECTION, SOLUTION INTRAVENOUS ONCE
Status: COMPLETED | OUTPATIENT
Start: 2024-09-17 | End: 2024-09-17

## 2024-09-17 RX ORDER — DIPHENHYDRAMINE HYDROCHLORIDE 50 MG/ML
50 INJECTION INTRAMUSCULAR; INTRAVENOUS AS NEEDED
Status: DISCONTINUED | OUTPATIENT
Start: 2024-09-17 | End: 2024-09-18 | Stop reason: HOSPADM

## 2024-09-17 RX ORDER — ATROPINE SULFATE 0.4 MG/ML
0.25 INJECTION, SOLUTION INTRAVENOUS
Status: DISCONTINUED | OUTPATIENT
Start: 2024-09-17 | End: 2024-09-18 | Stop reason: HOSPADM

## 2024-09-17 RX ADMIN — DEXTROSE MONOHYDRATE 20 ML/HR: 50 INJECTION, SOLUTION INTRAVENOUS at 09:46

## 2024-09-17 RX ADMIN — PALONOSETRON HYDROCHLORIDE 0.25 MG: 0.25 INJECTION INTRAVENOUS at 09:46

## 2024-09-17 RX ADMIN — FLUOROURACIL 4300 MG: 50 INJECTION, SOLUTION INTRAVENOUS at 14:36

## 2024-09-17 RX ADMIN — DEXAMETHASONE SODIUM PHOSPHATE 12 MG: 4 INJECTION, SOLUTION INTRA-ARTICULAR; INTRALESIONAL; INTRAMUSCULAR; INTRAVENOUS; SOFT TISSUE at 09:48

## 2024-09-17 RX ADMIN — LEUCOVORIN CALCIUM 700 MG: 350 INJECTION, POWDER, LYOPHILIZED, FOR SOLUTION INTRAMUSCULAR; INTRAVENOUS at 12:25

## 2024-09-17 RX ADMIN — OXALIPLATIN 150 MG: 5 INJECTION, SOLUTION INTRAVENOUS at 10:24

## 2024-09-17 RX ADMIN — ATROPINE SULFATE 0.25 MG: 0.4 INJECTION, SOLUTION INTRAVENOUS at 13:58

## 2024-09-17 RX ADMIN — IRINOTECAN HYDROCHLORIDE 205 MG: 20 INJECTION, SOLUTION INTRAVENOUS at 12:58

## 2024-09-18 ENCOUNTER — DOCUMENTATION (OUTPATIENT)
Dept: ONCOLOGY | Facility: HOSPITAL | Age: 69
End: 2024-09-18
Payer: MEDICARE

## 2024-09-19 ENCOUNTER — TELEPHONE (OUTPATIENT)
Dept: ONCOLOGY | Facility: HOSPITAL | Age: 69
End: 2024-09-19
Payer: MEDICARE

## 2024-09-19 ENCOUNTER — HOSPITAL ENCOUNTER (OUTPATIENT)
Dept: ONCOLOGY | Facility: HOSPITAL | Age: 69
Discharge: HOME | End: 2024-09-19
Admitting: INTERNAL MEDICINE
Payer: MEDICARE

## 2024-09-19 DIAGNOSIS — C25.0 MALIGNANT NEOPLASM OF HEAD OF PANCREAS: Primary | ICD-10-CM

## 2024-09-19 LAB
ALBUMIN SERPL-MCNC: 3.8 G/DL (ref 3.5–5.2)
ALBUMIN/GLOB SERPL: 1.6 G/DL
ALP SERPL-CCNC: 140 U/L (ref 39–117)
ALT SERPL W P-5'-P-CCNC: 25 U/L (ref 1–41)
ANION GAP SERPL CALCULATED.3IONS-SCNC: 4.9 MMOL/L (ref 5–15)
AST SERPL-CCNC: 33 U/L (ref 1–40)
BILIRUB SERPL-MCNC: 2 MG/DL (ref 0–1.2)
BUN SERPL-MCNC: 21 MG/DL (ref 8–23)
BUN/CREAT SERPL: 16.4 (ref 7–25)
CALCIUM SPEC-SCNC: 8.6 MG/DL (ref 8.6–10.5)
CHLORIDE SERPL-SCNC: 104 MMOL/L (ref 98–107)
CO2 SERPL-SCNC: 31.1 MMOL/L (ref 22–29)
CREAT SERPL-MCNC: 1.28 MG/DL (ref 0.76–1.27)
EGFRCR SERPLBLD CKD-EPI 2021: 60.2 ML/MIN/1.73
GLOBULIN UR ELPH-MCNC: 2.4 GM/DL
GLUCOSE SERPL-MCNC: 180 MG/DL (ref 65–99)
POTASSIUM SERPL-SCNC: 4 MMOL/L (ref 3.5–5.2)
PROT SERPL-MCNC: 6.2 G/DL (ref 6–8.5)
SODIUM SERPL-SCNC: 140 MMOL/L (ref 136–145)

## 2024-09-19 PROCEDURE — 25010000002 HEPARIN LOCK FLUSH PER 10 UNITS: Performed by: INTERNAL MEDICINE

## 2024-09-19 PROCEDURE — 36591 DRAW BLOOD OFF VENOUS DEVICE: CPT

## 2024-09-19 PROCEDURE — 80053 COMPREHEN METABOLIC PANEL: CPT | Performed by: INTERNAL MEDICINE

## 2024-09-19 RX ORDER — HEPARIN SODIUM (PORCINE) LOCK FLUSH IV SOLN 100 UNIT/ML 100 UNIT/ML
500 SOLUTION INTRAVENOUS AS NEEDED
Status: DISCONTINUED | OUTPATIENT
Start: 2024-09-19 | End: 2024-09-20 | Stop reason: HOSPADM

## 2024-09-19 RX ORDER — SODIUM CHLORIDE 0.9 % (FLUSH) 0.9 %
20 SYRINGE (ML) INJECTION AS NEEDED
Status: DISCONTINUED | OUTPATIENT
Start: 2024-09-19 | End: 2024-09-20 | Stop reason: HOSPADM

## 2024-09-19 RX ORDER — HEPARIN SODIUM (PORCINE) LOCK FLUSH IV SOLN 100 UNIT/ML 100 UNIT/ML
500 SOLUTION INTRAVENOUS AS NEEDED
OUTPATIENT
Start: 2024-09-19

## 2024-09-19 RX ORDER — SODIUM CHLORIDE 0.9 % (FLUSH) 0.9 %
20 SYRINGE (ML) INJECTION AS NEEDED
OUTPATIENT
Start: 2024-09-19

## 2024-09-19 RX ADMIN — Medication 20 ML: at 13:17

## 2024-09-19 RX ADMIN — HEPARIN 500 UNITS: 100 SYRINGE at 13:18

## 2024-09-23 ENCOUNTER — HOSPITAL ENCOUNTER (EMERGENCY)
Facility: HOSPITAL | Age: 69
Discharge: LEFT BEFORE DISCHARGE | End: 2024-09-23
Admitting: EMERGENCY MEDICINE
Payer: MEDICARE

## 2024-09-23 VITALS
BODY MASS INDEX: 24.77 KG/M2 | HEIGHT: 66 IN | TEMPERATURE: 97.9 F | RESPIRATION RATE: 20 BRPM | OXYGEN SATURATION: 97 % | WEIGHT: 154.1 LBS | DIASTOLIC BLOOD PRESSURE: 81 MMHG | HEART RATE: 63 BPM | SYSTOLIC BLOOD PRESSURE: 123 MMHG

## 2024-09-23 DIAGNOSIS — Z53.21 ELOPED FROM EMERGENCY DEPARTMENT: Primary | ICD-10-CM

## 2024-09-23 LAB
ALBUMIN SERPL-MCNC: 3.7 G/DL (ref 3.5–5.2)
ALBUMIN/GLOB SERPL: 1.2 G/DL
ALP SERPL-CCNC: 134 U/L (ref 39–117)
ALT SERPL W P-5'-P-CCNC: 27 U/L (ref 1–41)
ANION GAP SERPL CALCULATED.3IONS-SCNC: 9.8 MMOL/L (ref 5–15)
AST SERPL-CCNC: 35 U/L (ref 1–40)
BASOPHILS # BLD AUTO: 0.01 10*3/MM3 (ref 0–0.2)
BASOPHILS NFR BLD AUTO: 0.2 % (ref 0–1.5)
BILIRUB SERPL-MCNC: 1.8 MG/DL (ref 0–1.2)
BUN SERPL-MCNC: 25 MG/DL (ref 8–23)
BUN/CREAT SERPL: 20.7 (ref 7–25)
CALCIUM SPEC-SCNC: 8.4 MG/DL (ref 8.6–10.5)
CHLORIDE SERPL-SCNC: 104 MMOL/L (ref 98–107)
CO2 SERPL-SCNC: 22.2 MMOL/L (ref 22–29)
CREAT SERPL-MCNC: 1.21 MG/DL (ref 0.76–1.27)
DEPRECATED RDW RBC AUTO: 48.8 FL (ref 37–54)
EGFRCR SERPLBLD CKD-EPI 2021: 64.4 ML/MIN/1.73
EOSINOPHIL # BLD AUTO: 0.17 10*3/MM3 (ref 0–0.4)
EOSINOPHIL NFR BLD AUTO: 3.4 % (ref 0.3–6.2)
ERYTHROCYTE [DISTWIDTH] IN BLOOD BY AUTOMATED COUNT: 14.5 % (ref 12.3–15.4)
FLUAV SUBTYP SPEC NAA+PROBE: NOT DETECTED
FLUBV RNA ISLT QL NAA+PROBE: NOT DETECTED
GLOBULIN UR ELPH-MCNC: 3.1 GM/DL
GLUCOSE SERPL-MCNC: 99 MG/DL (ref 65–99)
HCT VFR BLD AUTO: 33.2 % (ref 37.5–51)
HGB BLD-MCNC: 10.5 G/DL (ref 13–17.7)
HOLD SPECIMEN: NORMAL
HOLD SPECIMEN: NORMAL
IMM GRANULOCYTES # BLD AUTO: 0.01 10*3/MM3 (ref 0–0.05)
IMM GRANULOCYTES NFR BLD AUTO: 0.2 % (ref 0–0.5)
LYMPHOCYTES # BLD AUTO: 1.69 10*3/MM3 (ref 0.7–3.1)
LYMPHOCYTES NFR BLD AUTO: 33.3 % (ref 19.6–45.3)
MCH RBC QN AUTO: 28.6 PG (ref 26.6–33)
MCHC RBC AUTO-ENTMCNC: 31.6 G/DL (ref 31.5–35.7)
MCV RBC AUTO: 90.5 FL (ref 79–97)
MONOCYTES # BLD AUTO: 0.18 10*3/MM3 (ref 0.1–0.9)
MONOCYTES NFR BLD AUTO: 3.6 % (ref 5–12)
NEUTROPHILS NFR BLD AUTO: 3.01 10*3/MM3 (ref 1.7–7)
NEUTROPHILS NFR BLD AUTO: 59.3 % (ref 42.7–76)
NRBC BLD AUTO-RTO: 0 /100 WBC (ref 0–0.2)
PLATELET # BLD AUTO: 290 10*3/MM3 (ref 140–450)
PMV BLD AUTO: 10.1 FL (ref 6–12)
POTASSIUM SERPL-SCNC: 5.3 MMOL/L (ref 3.5–5.2)
PROT SERPL-MCNC: 6.8 G/DL (ref 6–8.5)
RBC # BLD AUTO: 3.67 10*6/MM3 (ref 4.14–5.8)
RSV RNA NPH QL NAA+NON-PROBE: NOT DETECTED
SARS-COV-2 RNA RESP QL NAA+PROBE: NOT DETECTED
SODIUM SERPL-SCNC: 136 MMOL/L (ref 136–145)
WBC NRBC COR # BLD AUTO: 5.07 10*3/MM3 (ref 3.4–10.8)
WHOLE BLOOD HOLD COAG: NORMAL
WHOLE BLOOD HOLD SPECIMEN: NORMAL

## 2024-09-23 PROCEDURE — 87637 SARSCOV2&INF A&B&RSV AMP PRB: CPT

## 2024-09-23 PROCEDURE — 85025 COMPLETE CBC W/AUTO DIFF WBC: CPT | Performed by: REGISTERED NURSE

## 2024-09-23 PROCEDURE — 36415 COLL VENOUS BLD VENIPUNCTURE: CPT | Performed by: REGISTERED NURSE

## 2024-09-23 PROCEDURE — 80053 COMPREHEN METABOLIC PANEL: CPT | Performed by: REGISTERED NURSE

## 2024-09-23 PROCEDURE — 99283 EMERGENCY DEPT VISIT LOW MDM: CPT

## 2024-09-23 NOTE — ED PROVIDER NOTES
Time: 4:59 PM EDT  Date of encounter:  9/23/2024  Independent Historian/Clinical History and Information was obtained by:   Patient    History is limited by: N/A    Chief Complaint   Patient presents with    Back Pain     c\o had chemo, c\o having pain in lower spine, ribs and toes, cold chills, last chemo last tuesday goes back the 1st. pt tried to go to cancer center today but they were about to close and sent him here.    Chemo Symptoms    Pain     Chronic pain         History of Present Illness:  Patient is a 69 y.o. year old male who presents to the emergency department for evaluation of back pain, rib and toe pain.  Patient also has had chills.  Reports chemo 1 week ago.  Tried to see his oncologist however they were about to close and sent him here.  TRISHA Belle    Patient Care Team  Primary Care Provider: Forrest Duong MD    Past Medical History:     Allergies   Allergen Reactions    Aspirin Nausea Only     Past Medical History:   Diagnosis Date    Allergies     Asthma     GERD (gastroesophageal reflux disease)     Hypertension     Malignant neoplasm of pancreas 09/05/2024     Past Surgical History:   Procedure Laterality Date    HERNIA REPAIR       History reviewed. No pertinent family history.    Home Medications:  Prior to Admission medications    Medication Sig Start Date End Date Taking? Authorizing Provider   albuterol sulfate  (90 Base) MCG/ACT inhaler Inhale 2 puffs Every 4 (Four) Hours As Needed for Wheezing. 12/1/22   Yuri Patel DO   amLODIPine (Norvasc) 5 MG tablet Take 1 tablet by mouth Daily.    Provider, MD William   amoxicillin (AMOXIL) 500 MG capsule Take 1 capsule by mouth 3 times a day. 9/3/24   ProviderWilliam MD   azithromycin (Zithromax Z-Tomasz) 250 MG tablet Take 2 tablets by mouth on day 1, then 1 tablet daily on days 2-5 12/1/22   Yuri Patel DO   buprenorphine-naloxone (Suboxone) 8-2 MG film film Place 1 film under the tongue 2 (Two) Times a Day.     "William Dias MD   buPROPion XL (WELLBUTRIN XL) 300 MG 24 hr tablet Take 1 tablet by mouth Daily.    William Dias MD   busPIRone (BUSPAR) 15 MG tablet Take 1 tablet by mouth 3 (Three) Times a Day.    William Dias MD   cetirizine (ZyrTEC Allergy) 10 MG tablet Take 1 tablet by mouth Daily.    William Dias MD   gabapentin (NEURONTIN) 800 MG tablet Take 1 tablet by mouth 3 (Three) Times a Day.    William Dias MD   lidocaine-prilocaine (EMLA) 2.5-2.5 % cream Apply 1 Application topically to the appropriate area as directed As Needed for Injection Site Pain. 9/5/24   Gene Blanchard MD   mirtazapine (REMERON) 15 MG tablet Take 1 tablet by mouth Every Night.    William Dias MD   ondansetron (ZOFRAN) 8 MG tablet Take 1 tablet by mouth 3 (Three) Times a Day As Needed for Nausea or Vomiting. 9/6/24   Gene Blanchard MD   prazosin (MINIPRESS) 1 MG capsule Take 1 capsule by mouth Every Night.    William Dias MD   predniSONE (DELTASONE) 50 MG tablet Take 1 tablet by mouth Daily. 12/1/22   Yuri Patel DO   propranolol LA (Inderal LA) 60 MG 24 hr capsule Take 1 capsule by mouth Daily.    William Dias MD        Social History:   Social History     Tobacco Use    Smoking status: Every Day     Current packs/day: 0.50     Types: Cigarettes    Smokeless tobacco: Never   Vaping Use    Vaping status: Every Day   Substance Use Topics    Alcohol use: Never    Drug use: Never         Review of Systems:  Review of Systems   Constitutional:  Positive for chills.   Musculoskeletal:  Positive for arthralgias and back pain.        Physical Exam:  /81   Pulse 63   Temp 97.9 °F (36.6 °C) (Oral)   Resp 20   Ht 167.6 cm (66\")   Wt 69.9 kg (154 lb 1.6 oz)   SpO2 97%   BMI 24.87 kg/m²         Physical Exam  Constitutional:       Appearance: Normal appearance.   HENT:      Head: Normocephalic.   Eyes:      Extraocular Movements: Extraocular movements intact.      " Conjunctiva/sclera: Conjunctivae normal.   Pulmonary:      Effort: Pulmonary effort is normal.   Abdominal:      General: There is no distension.   Skin:     General: Skin is warm.      Coloration: Skin is not cyanotic.   Neurological:      Mental Status: He is alert and oriented to person, place, and time.   Psychiatric:         Attention and Perception: Attention and perception normal.         Mood and Affect: Mood normal.                      Procedures:  Procedures      Medical Decision Making:      Comorbidities that affect care:        External Notes reviewed:          The following orders were placed and all results were independently analyzed by me:  Orders Placed This Encounter   Procedures    COVID-19, FLU A/B, RSV PCR 1 HR TAT - Swab, Nasopharynx    Comprehensive Metabolic Panel    [place] Draw    CBC Auto Differential    CBC & Differential    Green Top (Gel)    Lavender Top    Gold Top - SST    Light Blue Top       Medications Given in the Emergency Department:  Medications - No data to display     ED Course:    The patient was initially evaluated in the triage area where orders were placed. The patient was later dispositioned by TRISHA Rowland.      The patient was advised to stay for completion of workup which includes but is not limited to communication of labs and radiological results, reassessment and plan. The patient was advised that leaving prior to disposition by a provider could result in critical findings that are not communicated to the patient.          Labs:    Lab Results (last 24 hours)       Procedure Component Value Units Date/Time    COVID-19, FLU A/B, RSV PCR 1 HR TAT - Swab, Nasopharynx [[id number]]  (Normal) Collected: 09/23/24 1708    Specimen: Swab from Nasopharynx Updated: 09/23/24 1804     COVID19 Not Detected     Influenza A PCR Not Detected     Influenza B PCR Not Detected     RSV, PCR Not Detected    Narrative:      Fact sheet for providers:  https://www.fda.gov/media/381742/download    Fact sheet for patients: https://www.fda.gov/media/645783/download    Test performed by PCR.    CBC & Differential [[id number]]  (Abnormal) Collected: 09/23/24 1715    Specimen: Blood from Hand, Right Updated: 09/23/24 1728    Narrative:      The following orders were created for panel order CBC & Differential.  Procedure                               Abnormality         Status                     ---------                               -----------         ------                     CBC Auto Differential[[id number]]        Abnormal            Final result                 Please view results for these tests on the individual orders.    Comprehensive Metabolic Panel [[id number]]  (Abnormal) Collected: 09/23/24 1715    Specimen: Blood from Hand, Right Updated: 09/23/24 1753     Glucose 99 mg/dL      BUN 25 mg/dL      Creatinine 1.21 mg/dL      Sodium 136 mmol/L      Potassium 5.3 mmol/L      Chloride 104 mmol/L      CO2 22.2 mmol/L      Calcium 8.4 mg/dL      Total Protein 6.8 g/dL      Albumin 3.7 g/dL      ALT (SGPT) 27 U/L      AST (SGOT) 35 U/L      Alkaline Phosphatase 134 U/L      Total Bilirubin 1.8 mg/dL      Globulin 3.1 gm/dL      A/G Ratio 1.2 g/dL      BUN/Creatinine Ratio 20.7     Anion Gap 9.8 mmol/L      eGFR 64.4 mL/min/1.73     Narrative:      GFR Normal >60  Chronic Kidney Disease <60  Kidney Failure <15      CBC Auto Differential [[id number]]  (Abnormal) Collected: 09/23/24 1715    Specimen: Blood from Hand, Right Updated: 09/23/24 1728     WBC 5.07 10*3/mm3      RBC 3.67 10*6/mm3      Hemoglobin 10.5 g/dL      Hematocrit 33.2 %      MCV 90.5 fL      MCH 28.6 pg      MCHC 31.6 g/dL      RDW 14.5 %      RDW-SD 48.8 fl      MPV 10.1 fL      Platelets 290 10*3/mm3      Neutrophil % 59.3 %      Lymphocyte % 33.3 %      Monocyte % 3.6 %      Eosinophil % 3.4 %      Basophil % 0.2 %      Immature Grans % 0.2 %      Neutrophils, Absolute 3.01 10*3/mm3       Lymphocytes, Absolute 1.69 10*3/mm3      Monocytes, Absolute 0.18 10*3/mm3      Eosinophils, Absolute 0.17 10*3/mm3      Basophils, Absolute 0.01 10*3/mm3      Immature Grans, Absolute 0.01 10*3/mm3      nRBC 0.0 /100 WBC              Imaging:    No Radiology Exams Resulted Within Past 24 Hours      Differential Diagnosis and Discussion:      Back Pain: The patient presents with back pain. My differential diagnosis includes but is not limited to acute spinal epidural abscess, acute spinal epidural bleed, cauda equina syndrome, abdominal aortic aneurysm, aortic dissection, kidney stone, pyelonephritis, musculoskeletal back pain, spinal fracture, and osteoarthritis.         MDM     Amount and/or Complexity of Data Reviewed  Decide to obtain previous medical records or to obtain history from someone other than the patient: yes                     Patient Care Considerations:          Consultants/Shared Management Plan:        Social Determinants of Health:          Disposition and Care Coordination:    Eloped: This patient has left the emergency department or waiting room with no communication to myself, nursing or administrative staff. There was no opportunity to discuss the patient's decision to leave, provide medical advice or discuss alternatives to. The staff has made efforts to locate patient without success.        Final diagnoses:   Eloped from emergency department        ED Disposition       ED Disposition   Eloped    Condition   --    Comment   --               This medical record created using voice recognition software.             Mabel Gonzalez, APRN  09/23/24 1941

## 2024-09-24 ENCOUNTER — TELEPHONE (OUTPATIENT)
Dept: ONCOLOGY | Facility: HOSPITAL | Age: 69
End: 2024-09-24
Payer: MEDICARE

## 2024-09-24 ENCOUNTER — OFFICE VISIT (OUTPATIENT)
Dept: ONCOLOGY | Facility: HOSPITAL | Age: 69
End: 2024-09-24
Payer: MEDICARE

## 2024-09-24 VITALS
OXYGEN SATURATION: 98 % | RESPIRATION RATE: 20 BRPM | DIASTOLIC BLOOD PRESSURE: 85 MMHG | HEART RATE: 104 BPM | HEIGHT: 66 IN | SYSTOLIC BLOOD PRESSURE: 117 MMHG | TEMPERATURE: 96.6 F | BODY MASS INDEX: 25.13 KG/M2 | WEIGHT: 156.4 LBS

## 2024-09-24 DIAGNOSIS — C80.1 NEUROPATHY ASSOCIATED WITH CANCER: Primary | ICD-10-CM

## 2024-09-24 DIAGNOSIS — G63 NEUROPATHY ASSOCIATED WITH CANCER: Primary | ICD-10-CM

## 2024-09-24 PROCEDURE — G0463 HOSPITAL OUTPT CLINIC VISIT: HCPCS | Performed by: INTERNAL MEDICINE

## 2024-09-24 RX ORDER — DULOXETIN HYDROCHLORIDE 30 MG/1
30 CAPSULE, DELAYED RELEASE ORAL DAILY
Qty: 30 CAPSULE | Refills: 1 | Status: SHIPPED | OUTPATIENT
Start: 2024-09-24

## 2024-09-25 PROBLEM — C80.1 NEUROPATHY ASSOCIATED WITH CANCER: Status: ACTIVE | Noted: 2024-09-25

## 2024-09-25 PROBLEM — G63 NEUROPATHY ASSOCIATED WITH CANCER: Status: ACTIVE | Noted: 2024-09-25

## 2024-10-01 ENCOUNTER — OFFICE VISIT (OUTPATIENT)
Dept: ONCOLOGY | Facility: HOSPITAL | Age: 69
End: 2024-10-01
Payer: MEDICARE

## 2024-10-01 ENCOUNTER — DOCUMENTATION (OUTPATIENT)
Dept: NUTRITION | Facility: HOSPITAL | Age: 69
End: 2024-10-01
Payer: MEDICARE

## 2024-10-01 ENCOUNTER — HOSPITAL ENCOUNTER (OUTPATIENT)
Dept: ONCOLOGY | Facility: HOSPITAL | Age: 69
Discharge: HOME | End: 2024-10-01
Payer: MEDICARE

## 2024-10-01 ENCOUNTER — LAB (OUTPATIENT)
Dept: ONCOLOGY | Facility: HOSPITAL | Age: 69
End: 2024-10-01
Payer: MEDICARE

## 2024-10-01 VITALS
OXYGEN SATURATION: 94 % | SYSTOLIC BLOOD PRESSURE: 130 MMHG | DIASTOLIC BLOOD PRESSURE: 80 MMHG | WEIGHT: 154.32 LBS | HEART RATE: 57 BPM | RESPIRATION RATE: 18 BRPM | BODY MASS INDEX: 24.91 KG/M2 | TEMPERATURE: 97.8 F

## 2024-10-01 VITALS
HEART RATE: 57 BPM | WEIGHT: 154 LBS | BODY MASS INDEX: 24.86 KG/M2 | SYSTOLIC BLOOD PRESSURE: 130 MMHG | RESPIRATION RATE: 18 BRPM | TEMPERATURE: 97.8 F | OXYGEN SATURATION: 94 % | DIASTOLIC BLOOD PRESSURE: 80 MMHG

## 2024-10-01 DIAGNOSIS — C80.1 NEUROPATHY ASSOCIATED WITH CANCER: ICD-10-CM

## 2024-10-01 DIAGNOSIS — C25.0 MALIGNANT NEOPLASM OF HEAD OF PANCREAS: Primary | ICD-10-CM

## 2024-10-01 DIAGNOSIS — G63 NEUROPATHY ASSOCIATED WITH CANCER: ICD-10-CM

## 2024-10-01 LAB
ALBUMIN SERPL-MCNC: 3.8 G/DL (ref 3.5–5.2)
ALBUMIN/GLOB SERPL: 1.8 G/DL
ALP SERPL-CCNC: 110 U/L (ref 39–117)
ALT SERPL W P-5'-P-CCNC: 22 U/L (ref 1–41)
ANION GAP SERPL CALCULATED.3IONS-SCNC: 2 MMOL/L (ref 5–15)
AST SERPL-CCNC: 28 U/L (ref 1–40)
BASOPHILS # BLD AUTO: 0.01 10*3/MM3 (ref 0–0.2)
BASOPHILS NFR BLD AUTO: 0.3 % (ref 0–1.5)
BILIRUB SERPL-MCNC: 1.1 MG/DL (ref 0–1.2)
BUN SERPL-MCNC: 12 MG/DL (ref 8–23)
BUN/CREAT SERPL: 11.8 (ref 7–25)
CALCIUM SPEC-SCNC: 8.7 MG/DL (ref 8.6–10.5)
CHLORIDE SERPL-SCNC: 107 MMOL/L (ref 98–107)
CO2 SERPL-SCNC: 28 MMOL/L (ref 22–29)
CREAT SERPL-MCNC: 1.02 MG/DL (ref 0.76–1.27)
DEPRECATED RDW RBC AUTO: 47.2 FL (ref 37–54)
EGFRCR SERPLBLD CKD-EPI 2021: 79.1 ML/MIN/1.73
EOSINOPHIL # BLD AUTO: 0.15 10*3/MM3 (ref 0–0.4)
EOSINOPHIL NFR BLD AUTO: 3.8 % (ref 0.3–6.2)
ERYTHROCYTE [DISTWIDTH] IN BLOOD BY AUTOMATED COUNT: 14.3 % (ref 12.3–15.4)
GLOBULIN UR ELPH-MCNC: 2.1 GM/DL
GLUCOSE SERPL-MCNC: 116 MG/DL (ref 65–99)
HCT VFR BLD AUTO: 32 % (ref 37.5–51)
HGB BLD-MCNC: 10.3 G/DL (ref 13–17.7)
IMM GRANULOCYTES # BLD AUTO: 0.01 10*3/MM3 (ref 0–0.05)
IMM GRANULOCYTES NFR BLD AUTO: 0.3 % (ref 0–0.5)
LYMPHOCYTES # BLD AUTO: 1.22 10*3/MM3 (ref 0.7–3.1)
LYMPHOCYTES NFR BLD AUTO: 31.2 % (ref 19.6–45.3)
MCH RBC QN AUTO: 28.6 PG (ref 26.6–33)
MCHC RBC AUTO-ENTMCNC: 32.2 G/DL (ref 31.5–35.7)
MCV RBC AUTO: 88.9 FL (ref 79–97)
MONOCYTES # BLD AUTO: 0.49 10*3/MM3 (ref 0.1–0.9)
MONOCYTES NFR BLD AUTO: 12.5 % (ref 5–12)
NEUTROPHILS NFR BLD AUTO: 2.03 10*3/MM3 (ref 1.7–7)
NEUTROPHILS NFR BLD AUTO: 51.9 % (ref 42.7–76)
PLATELET # BLD AUTO: 218 10*3/MM3 (ref 140–450)
PMV BLD AUTO: 9.7 FL (ref 6–12)
POTASSIUM SERPL-SCNC: 4.2 MMOL/L (ref 3.5–5.2)
PROT SERPL-MCNC: 5.9 G/DL (ref 6–8.5)
RBC # BLD AUTO: 3.6 10*6/MM3 (ref 4.14–5.8)
SODIUM SERPL-SCNC: 137 MMOL/L (ref 136–145)
WBC NRBC COR # BLD AUTO: 3.91 10*3/MM3 (ref 3.4–10.8)

## 2024-10-01 PROCEDURE — 25010000002 FLUOROURACIL PER 500 MG: Performed by: INTERNAL MEDICINE

## 2024-10-01 PROCEDURE — 25010000002 PALONOSETRON PER 25 MCG: Performed by: INTERNAL MEDICINE

## 2024-10-01 PROCEDURE — 80053 COMPREHEN METABOLIC PANEL: CPT | Performed by: INTERNAL MEDICINE

## 2024-10-01 PROCEDURE — 25010000002 IRINOTECAN PER 20 MG: Performed by: INTERNAL MEDICINE

## 2024-10-01 PROCEDURE — 96366 THER/PROPH/DIAG IV INF ADDON: CPT

## 2024-10-01 PROCEDURE — 96417 CHEMO IV INFUS EACH ADDL SEQ: CPT

## 2024-10-01 PROCEDURE — 96368 THER/DIAG CONCURRENT INF: CPT

## 2024-10-01 PROCEDURE — 25010000002 LEUCOVORIN CALCIUM PER 50 MG: Performed by: INTERNAL MEDICINE

## 2024-10-01 PROCEDURE — 25010000002 DEXAMETHASONE SODIUM PHOSPHATE 120 MG/30ML SOLUTION: Performed by: INTERNAL MEDICINE

## 2024-10-01 PROCEDURE — 0 DEXTROSE 5 % SOLUTION: Performed by: INTERNAL MEDICINE

## 2024-10-01 PROCEDURE — 96415 CHEMO IV INFUSION ADDL HR: CPT

## 2024-10-01 PROCEDURE — 0 DEXTROSE 5 % SOLUTION 500 ML FLEX CONT: Performed by: INTERNAL MEDICINE

## 2024-10-01 PROCEDURE — 0 DEXTROSE 5 % SOLUTION 250 ML FLEX CONT: Performed by: INTERNAL MEDICINE

## 2024-10-01 PROCEDURE — 25010000002 OXALIPLATIN PER 0.5 MG: Performed by: INTERNAL MEDICINE

## 2024-10-01 PROCEDURE — G0498 CHEMO EXTEND IV INFUS W/PUMP: HCPCS

## 2024-10-01 PROCEDURE — 96375 TX/PRO/DX INJ NEW DRUG ADDON: CPT

## 2024-10-01 PROCEDURE — 85025 COMPLETE CBC W/AUTO DIFF WBC: CPT | Performed by: INTERNAL MEDICINE

## 2024-10-01 PROCEDURE — 96413 CHEMO IV INFUSION 1 HR: CPT

## 2024-10-01 RX ORDER — IBUPROFEN 800 MG/1
800 TABLET, FILM COATED ORAL EVERY 8 HOURS PRN
Qty: 30 TABLET | Refills: 0 | Status: SHIPPED | OUTPATIENT
Start: 2024-10-01

## 2024-10-01 RX ORDER — FAMOTIDINE 10 MG/ML
20 INJECTION, SOLUTION INTRAVENOUS AS NEEDED
Status: CANCELLED | OUTPATIENT
Start: 2024-10-01

## 2024-10-01 RX ORDER — ATROPINE SULFATE 1 MG/ML
0.25 INJECTION, SOLUTION INTRAMUSCULAR; INTRAVENOUS; SUBCUTANEOUS
Status: CANCELLED | OUTPATIENT
Start: 2024-10-01

## 2024-10-01 RX ORDER — DEXTROSE MONOHYDRATE 50 MG/ML
20 INJECTION, SOLUTION INTRAVENOUS ONCE
Status: COMPLETED | OUTPATIENT
Start: 2024-10-01 | End: 2024-10-01

## 2024-10-01 RX ORDER — DEXTROSE MONOHYDRATE 50 MG/ML
20 INJECTION, SOLUTION INTRAVENOUS ONCE
Status: CANCELLED | OUTPATIENT
Start: 2024-10-01

## 2024-10-01 RX ORDER — PALONOSETRON 0.05 MG/ML
0.25 INJECTION, SOLUTION INTRAVENOUS ONCE
Status: COMPLETED | OUTPATIENT
Start: 2024-10-01 | End: 2024-10-01

## 2024-10-01 RX ORDER — DIPHENHYDRAMINE HYDROCHLORIDE 50 MG/ML
50 INJECTION INTRAMUSCULAR; INTRAVENOUS AS NEEDED
Status: CANCELLED | OUTPATIENT
Start: 2024-10-01

## 2024-10-01 RX ORDER — PALONOSETRON 0.05 MG/ML
0.25 INJECTION, SOLUTION INTRAVENOUS ONCE
Status: CANCELLED | OUTPATIENT
Start: 2024-10-01

## 2024-10-01 RX ORDER — DIPHENHYDRAMINE HYDROCHLORIDE 50 MG/ML
50 INJECTION INTRAMUSCULAR; INTRAVENOUS AS NEEDED
Status: DISCONTINUED | OUTPATIENT
Start: 2024-10-01 | End: 2024-10-02 | Stop reason: HOSPADM

## 2024-10-01 RX ORDER — FAMOTIDINE 10 MG/ML
20 INJECTION, SOLUTION INTRAVENOUS AS NEEDED
Status: DISCONTINUED | OUTPATIENT
Start: 2024-10-01 | End: 2024-10-02 | Stop reason: HOSPADM

## 2024-10-01 RX ADMIN — FLUOROURACIL 3900 MG: 50 INJECTION, SOLUTION INTRAVENOUS at 14:47

## 2024-10-01 RX ADMIN — DEXAMETHASONE SODIUM PHOSPHATE 12 MG: 4 INJECTION, SOLUTION INTRA-ARTICULAR; INTRALESIONAL; INTRAMUSCULAR; INTRAVENOUS; SOFT TISSUE at 09:34

## 2024-10-01 RX ADMIN — OXALIPLATIN 140 MG: 5 INJECTION, SOLUTION INTRAVENOUS at 10:03

## 2024-10-01 RX ADMIN — PALONOSETRON HYDROCHLORIDE 0.25 MG: 0.25 INJECTION INTRAVENOUS at 09:32

## 2024-10-01 RX ADMIN — LEUCOVORIN CALCIUM 660 MG: 350 INJECTION, POWDER, LYOPHILIZED, FOR SOLUTION INTRAMUSCULAR; INTRAVENOUS at 12:33

## 2024-10-01 RX ADMIN — IRINOTECAN HYDROCHLORIDE 205 MG: 20 INJECTION, SOLUTION INTRAVENOUS at 13:07

## 2024-10-01 RX ADMIN — ATROPINE SULFATE 0.25 MG: 0.4 INJECTION, SOLUTION INTRAVENOUS at 13:32

## 2024-10-01 RX ADMIN — DEXTROSE MONOHYDRATE 20 ML/HR: 50 INJECTION, SOLUTION INTRAVENOUS at 09:32

## 2024-10-01 NOTE — ASSESSMENT & PLAN NOTE
Patient recently started on duloxetine.  He is also on Suboxone and gabapentin.  No change yet but he only recently started the duloxetine.  Continue same.  I will add ibuprofen 800 mg 3 times a day for 10 days.  Reassess next visit.

## 2024-10-01 NOTE — PROGRESS NOTES
Chief Complaint  No chief complaint on file.    Ad, MD Ad Azevedo, Forrest Leon MD    Subjective          Josue Stern presents to [facility] for ongoing treatment of his pancreatic cancer.  He is on neoadjuvant FOLFIRINOX.  He had a lot of issues with his first cycle including increased fatigue and neuropathic pain especially in his feet.  Recently started on duloxetine but he is only been on a few days.  He takes Suboxone and gabapentin chronically.  He reports adequate appetite.  He denies nausea, vomiting or diarrhea.    Oncology/Hematology History   Malignant neoplasm of head of pancreas   9/5/2024 Initial Diagnosis    Malignant neoplasm of pancreas     9/5/2024 -  Chemotherapy    OP CENTRAL VENOUS ACCESS DEVICE Access, Care, and Maintenance (CVAD)     9/17/2024 -  Chemotherapy    OP PANCREATIC mFOLFIRINOX (OXALIplatin / Leucovorin / Irinotecan / Fluorouracil CIV)           Current Outpatient Medications on File Prior to Visit   Medication Sig Dispense Refill    albuterol sulfate  (90 Base) MCG/ACT inhaler Inhale 2 puffs Every 4 (Four) Hours As Needed for Wheezing. 18 g 0    amLODIPine (Norvasc) 5 MG tablet Take 1 tablet by mouth Daily.      amoxicillin (AMOXIL) 500 MG capsule Take 1 capsule by mouth 3 times a day.      azithromycin (Zithromax Z-Tomasz) 250 MG tablet Take 2 tablets by mouth on day 1, then 1 tablet daily on days 2-5 6 tablet 0    buprenorphine-naloxone (Suboxone) 8-2 MG film film Place 1 film under the tongue 2 (Two) Times a Day.      busPIRone (BUSPAR) 15 MG tablet Take 1 tablet by mouth 3 (Three) Times a Day.      cetirizine (ZyrTEC Allergy) 10 MG tablet Take 1 tablet by mouth Daily.      DULoxetine (CYMBALTA) 30 MG capsule Take 1 capsule by mouth Daily. 30 capsule 1    gabapentin (NEURONTIN) 800 MG tablet Take 1 tablet by mouth 3 (Three) Times a Day.      lidocaine-prilocaine (EMLA) 2.5-2.5 % cream Apply 1 Application topically  to the appropriate area as directed As Needed for Injection Site Pain. 30 g 1    ondansetron (ZOFRAN) 8 MG tablet Take 1 tablet by mouth 3 (Three) Times a Day As Needed for Nausea or Vomiting. 30 tablet 5    prazosin (MINIPRESS) 1 MG capsule Take 1 capsule by mouth Every Night.      predniSONE (DELTASONE) 50 MG tablet Take 1 tablet by mouth Daily. 5 tablet 0    propranolol LA (Inderal LA) 60 MG 24 hr capsule Take 1 capsule by mouth Daily.       No current facility-administered medications on file prior to visit.       Allergies   Allergen Reactions    Aspirin Nausea Only     Past Medical History:   Diagnosis Date    Allergies     Asthma     GERD (gastroesophageal reflux disease)     Hypertension     Malignant neoplasm of pancreas 09/05/2024     Past Surgical History:   Procedure Laterality Date    HERNIA REPAIR       Social History     Socioeconomic History    Marital status: Single   Tobacco Use    Smoking status: Every Day     Current packs/day: 0.50     Types: Cigarettes    Smokeless tobacco: Never   Vaping Use    Vaping status: Every Day   Substance and Sexual Activity    Alcohol use: Never    Drug use: Never    Sexual activity: Defer     History reviewed. No pertinent family history.    Objective   Physical Exam  Vitals reviewed. Exam conducted with a chaperone present.   Cardiovascular:      Rate and Rhythm: Normal rate and regular rhythm.      Heart sounds: Normal heart sounds.   Pulmonary:      Effort: Pulmonary effort is normal.      Breath sounds: Normal breath sounds.      Comments: Port-A-Cath  Abdominal:      General: Bowel sounds are normal.   Lymphadenopathy:      Cervical: No cervical adenopathy.   Psychiatric:         Mood and Affect: Mood normal.         Behavior: Behavior normal.         Vitals:    10/01/24 0851   BP: 130/80   Pulse: 57   Resp: 18   Temp: 97.8 °F (36.6 °C)   TempSrc: Temporal   SpO2: 94%   Weight: 69.9 kg (154 lb)   PainSc:   6   PainLoc: Back     ECOG score: 0         PHQ-9 Total  Score:                    Result Review :   The following data was reviewed by: Gene Blanchard MD on 10/01/2024:  Lab Results   Component Value Date    HGB 10.3 (L) 10/01/2024    HCT 32.0 (L) 10/01/2024    MCV 88.9 10/01/2024     10/01/2024    WBC 3.91 10/01/2024    NEUTROABS 2.03 10/01/2024    LYMPHSABS 1.22 10/01/2024    MONOSABS 0.49 10/01/2024    EOSABS 0.15 10/01/2024    BASOSABS 0.01 10/01/2024     Lab Results   Component Value Date    GLUCOSE 116 (H) 10/01/2024    BUN 12 10/01/2024    CREATININE 1.02 10/01/2024     10/01/2024    K 4.2 10/01/2024     10/01/2024    CO2 28.0 10/01/2024    CALCIUM 8.7 10/01/2024    PROTEINTOT 5.9 (L) 10/01/2024    ALBUMIN 3.8 10/01/2024    BILITOT 1.1 10/01/2024    ALKPHOS 110 10/01/2024    AST 28 10/01/2024    ALT 22 10/01/2024     Lab Results   Component Value Date    MG 1.7 12/10/2021    PHOS 3.4 12/10/2021    FREET4 1.74 06/30/2020    TSH 0.256 (L) 12/06/2021     Lab Results   Component Value Date    IRON 15 (L) 12/08/2021    LABIRON 7 (L) 12/08/2021    TRANSFERRIN 150 (L) 12/08/2021    TIBC 224 (L) 12/08/2021     Lab Results   Component Value Date    FERRITIN 75 12/21/2020    OAMUUFHM35 1,022 (H) 12/08/2021    FOLATE 10.90 12/08/2021     Lab Results   Component Value Date     1,386.0 (H) 09/05/2024             Assessment and Plan    Diagnoses and all orders for this visit:    1. Malignant neoplasm of head of pancreas (Primary)  Assessment & Plan:  Patient is on neoadjuvant chemotherapy with the FOLFIRINOX regimen.  He notes increased fatigue, neuropathic pain.  He is due for cycle 2.  Lab work today is adequate for treatment.  I will dose reduce given his issues.  I will see him back for cycle 3-day 1 with lab work prior to monitor for toxicities.    Orders:  -     CBC and Differential; Future  -     Comprehensive metabolic panel; Future  -     Infusion Appointment Request 10; Future    2. Neuropathy associated with cancer  Assessment &  Plan:  Patient recently started on duloxetine.  He is also on Suboxone and gabapentin.  No change yet but he only recently started the duloxetine.  Continue same.  I will add ibuprofen 800 mg 3 times a day for 10 days.  Reassess next visit.    Orders:  -     ibuprofen (ADVIL,MOTRIN) 800 MG tablet; Take 1 tablet by mouth Every 8 (Eight) Hours As Needed for Mild Pain.  Dispense: 30 tablet; Refill: 0    Other orders  -     Cancel: dextrose (D5W) 5 % infusion  -     Cancel: palonosetron (ALOXI) injection 0.25 mg  -     Cancel: dexAMETHasone (DECADRON) 12 mg in sodium chloride 0.9 % IVPB  -     Cancel: OXALIplatin (ELOXATIN) 140 mg in dextrose (D5W) 5 % 278 mL chemo IVPB  -     Cancel: leucovorin 660 mg in dextrose (D5W) 5 % 316 mL IVPB  -     Cancel: irinotecan (CAMPTOSAR) 205 mg in dextrose (D5W) 5 % 510.3 mL chemo IVPB  -     Cancel: atropine injection 0.25 mg  -     Cancel: fluorouracil (ADRUCIL) 3,930 mg in sodium chloride 0.9 % 78.6 mL chemo infusion - FOR HOME USE  -     Cancel: Hydrocortisone Sod Suc (PF) (Solu-CORTEF) injection 100 mg  -     Cancel: diphenhydrAMINE (BENADRYL) injection 50 mg  -     Cancel: famotidine (PEPCID) injection 20 mg            Patient Follow Up: Cycle 3-day 1    Patient was given instructions and counseling regarding his condition or for health maintenance advice. Please see specific information pulled into the AVS if appropriate.     Gene Blanchard MD    10/1/2024

## 2024-10-01 NOTE — ASSESSMENT & PLAN NOTE
Patient is on neoadjuvant chemotherapy with the FOLFIRINOX regimen.  He notes increased fatigue, neuropathic pain.  He is due for cycle 2.  Lab work today is adequate for treatment.  I will dose reduce given his issues.  I will see him back for cycle 3-day 1 with lab work prior to monitor for toxicities.

## 2024-10-01 NOTE — PROGRESS NOTES
Outpatient Nutrition Oncology Follow Up    Patient Name: Josue Stern  YOB: 1955  MRN: [id number]      Reason for Consult:  Identified as high risk by screening criteria       Today's Weight:   69.9 kg    Weight Change:  1.3% wt decline in the past week    Nutrition-related concerns: Diarrhea    Consult or Intervention:  Met with pt briefly while receiving his infusion.  Pt was very drowsy today and difficult to keep awake during our conversation.  He has lost weight.  He reports episodes of diarrhea to infusion nurse.  Provided pt with handout on pancreatic enzymes, only to make pt aware of and identify symptoms of steatorrhea.  Pt has nutrition handout on diarrhea, given on 9/17/24 and infusion nurse to provide additional information on diarrhea today.    Nutrition Diagnosis: Unintentional weight loss related to GI dysfunction as evidenced by physiological causes increasing nutrient needs. and hypermetabolic state.    Plan: Will follow up per oncology nutrition protocol

## 2024-10-03 ENCOUNTER — HOSPITAL ENCOUNTER (OUTPATIENT)
Dept: ONCOLOGY | Facility: HOSPITAL | Age: 69
Discharge: HOME | End: 2024-10-03
Payer: MEDICARE

## 2024-10-03 DIAGNOSIS — C25.0 MALIGNANT NEOPLASM OF HEAD OF PANCREAS: Primary | ICD-10-CM

## 2024-10-03 PROCEDURE — 25010000002 HEPARIN LOCK FLUSH PER 10 UNITS: Performed by: INTERNAL MEDICINE

## 2024-10-03 RX ORDER — SODIUM CHLORIDE 0.9 % (FLUSH) 0.9 %
20 SYRINGE (ML) INJECTION AS NEEDED
OUTPATIENT
Start: 2024-10-03

## 2024-10-03 RX ORDER — HEPARIN SODIUM (PORCINE) LOCK FLUSH IV SOLN 100 UNIT/ML 100 UNIT/ML
500 SOLUTION INTRAVENOUS AS NEEDED
OUTPATIENT
Start: 2024-10-03

## 2024-10-03 RX ORDER — HEPARIN SODIUM (PORCINE) LOCK FLUSH IV SOLN 100 UNIT/ML 100 UNIT/ML
500 SOLUTION INTRAVENOUS AS NEEDED
Status: DISCONTINUED | OUTPATIENT
Start: 2024-10-03 | End: 2024-10-04 | Stop reason: HOSPADM

## 2024-10-03 RX ORDER — SODIUM CHLORIDE 0.9 % (FLUSH) 0.9 %
20 SYRINGE (ML) INJECTION AS NEEDED
Status: DISCONTINUED | OUTPATIENT
Start: 2024-10-03 | End: 2024-10-04 | Stop reason: HOSPADM

## 2024-10-03 RX ADMIN — Medication 20 ML: at 13:46

## 2024-10-03 RX ADMIN — HEPARIN 500 UNITS: 100 SYRINGE at 13:46

## 2024-10-15 ENCOUNTER — OFFICE VISIT (OUTPATIENT)
Dept: ONCOLOGY | Facility: HOSPITAL | Age: 69
End: 2024-10-15
Payer: MEDICARE

## 2024-10-15 ENCOUNTER — DOCUMENTATION (OUTPATIENT)
Dept: ONCOLOGY | Facility: HOSPITAL | Age: 69
End: 2024-10-15
Payer: MEDICARE

## 2024-10-15 ENCOUNTER — HOSPITAL ENCOUNTER (OUTPATIENT)
Dept: ONCOLOGY | Facility: HOSPITAL | Age: 69
Discharge: HOME | End: 2024-10-15
Payer: MEDICARE

## 2024-10-15 VITALS
OXYGEN SATURATION: 100 % | RESPIRATION RATE: 18 BRPM | WEIGHT: 158 LBS | TEMPERATURE: 97.2 F | SYSTOLIC BLOOD PRESSURE: 164 MMHG | DIASTOLIC BLOOD PRESSURE: 96 MMHG | HEART RATE: 60 BPM | BODY MASS INDEX: 25.5 KG/M2

## 2024-10-15 VITALS
WEIGHT: 158.2 LBS | OXYGEN SATURATION: 100 % | DIASTOLIC BLOOD PRESSURE: 96 MMHG | SYSTOLIC BLOOD PRESSURE: 164 MMHG | TEMPERATURE: 97.2 F | BODY MASS INDEX: 25.53 KG/M2 | RESPIRATION RATE: 18 BRPM | HEART RATE: 60 BPM

## 2024-10-15 DIAGNOSIS — C25.0 MALIGNANT NEOPLASM OF HEAD OF PANCREAS: Primary | ICD-10-CM

## 2024-10-15 DIAGNOSIS — C80.1 NEUROPATHY ASSOCIATED WITH CANCER: ICD-10-CM

## 2024-10-15 DIAGNOSIS — G63 NEUROPATHY ASSOCIATED WITH CANCER: ICD-10-CM

## 2024-10-15 LAB
ALBUMIN SERPL-MCNC: 3.6 G/DL (ref 3.5–5.2)
ALBUMIN/GLOB SERPL: 1.6 G/DL
ALP SERPL-CCNC: 135 U/L (ref 39–117)
ALT SERPL W P-5'-P-CCNC: 35 U/L (ref 1–41)
ANION GAP SERPL CALCULATED.3IONS-SCNC: 4.8 MMOL/L (ref 5–15)
AST SERPL-CCNC: 47 U/L (ref 1–40)
BASOPHILS # BLD AUTO: 0.01 10*3/MM3 (ref 0–0.2)
BASOPHILS NFR BLD AUTO: 0.3 % (ref 0–1.5)
BILIRUB SERPL-MCNC: 0.7 MG/DL (ref 0–1.2)
BUN SERPL-MCNC: 10 MG/DL (ref 8–23)
BUN/CREAT SERPL: 9.8 (ref 7–25)
CALCIUM SPEC-SCNC: 8.7 MG/DL (ref 8.6–10.5)
CHLORIDE SERPL-SCNC: 109 MMOL/L (ref 98–107)
CO2 SERPL-SCNC: 25.2 MMOL/L (ref 22–29)
CREAT SERPL-MCNC: 1.02 MG/DL (ref 0.76–1.27)
DEPRECATED RDW RBC AUTO: 41.8 FL (ref 37–54)
EGFRCR SERPLBLD CKD-EPI 2021: 79.1 ML/MIN/1.73
EOSINOPHIL # BLD AUTO: 0.02 10*3/MM3 (ref 0–0.4)
EOSINOPHIL NFR BLD AUTO: 0.5 % (ref 0.3–6.2)
ERYTHROCYTE [DISTWIDTH] IN BLOOD BY AUTOMATED COUNT: 13.3 % (ref 12.3–15.4)
GLOBULIN UR ELPH-MCNC: 2.2 GM/DL
GLUCOSE SERPL-MCNC: 134 MG/DL (ref 65–99)
HCT VFR BLD AUTO: 32.3 % (ref 37.5–51)
HGB BLD-MCNC: 10.7 G/DL (ref 13–17.7)
IMM GRANULOCYTES # BLD AUTO: 0.01 10*3/MM3 (ref 0–0.05)
IMM GRANULOCYTES NFR BLD AUTO: 0.3 % (ref 0–0.5)
LYMPHOCYTES # BLD AUTO: 1.93 10*3/MM3 (ref 0.7–3.1)
LYMPHOCYTES NFR BLD AUTO: 51.3 % (ref 19.6–45.3)
MCH RBC QN AUTO: 28.4 PG (ref 26.6–33)
MCHC RBC AUTO-ENTMCNC: 33.1 G/DL (ref 31.5–35.7)
MCV RBC AUTO: 85.7 FL (ref 79–97)
MONOCYTES # BLD AUTO: 0.49 10*3/MM3 (ref 0.1–0.9)
MONOCYTES NFR BLD AUTO: 13 % (ref 5–12)
NEUTROPHILS NFR BLD AUTO: 1.3 10*3/MM3 (ref 1.7–7)
NEUTROPHILS NFR BLD AUTO: 34.6 % (ref 42.7–76)
PLATELET # BLD AUTO: 134 10*3/MM3 (ref 140–450)
PMV BLD AUTO: 10.1 FL (ref 6–12)
POTASSIUM SERPL-SCNC: 3.7 MMOL/L (ref 3.5–5.2)
PROT SERPL-MCNC: 5.8 G/DL (ref 6–8.5)
RBC # BLD AUTO: 3.77 10*6/MM3 (ref 4.14–5.8)
SODIUM SERPL-SCNC: 139 MMOL/L (ref 136–145)
WBC NRBC COR # BLD AUTO: 3.76 10*3/MM3 (ref 3.4–10.8)

## 2024-10-15 PROCEDURE — 96415 CHEMO IV INFUSION ADDL HR: CPT

## 2024-10-15 PROCEDURE — 25010000002 IRINOTECAN PER 20 MG: Performed by: INTERNAL MEDICINE

## 2024-10-15 PROCEDURE — 96368 THER/DIAG CONCURRENT INF: CPT

## 2024-10-15 PROCEDURE — G0498 CHEMO EXTEND IV INFUS W/PUMP: HCPCS

## 2024-10-15 PROCEDURE — 80053 COMPREHEN METABOLIC PANEL: CPT | Performed by: INTERNAL MEDICINE

## 2024-10-15 PROCEDURE — 25010000002 PALONOSETRON PER 25 MCG: Performed by: INTERNAL MEDICINE

## 2024-10-15 PROCEDURE — 96366 THER/PROPH/DIAG IV INF ADDON: CPT

## 2024-10-15 PROCEDURE — 25010000002 FLUOROURACIL PER 500 MG: Performed by: INTERNAL MEDICINE

## 2024-10-15 PROCEDURE — 0 DEXTROSE 5 % SOLUTION: Performed by: INTERNAL MEDICINE

## 2024-10-15 PROCEDURE — 96376 TX/PRO/DX INJ SAME DRUG ADON: CPT

## 2024-10-15 PROCEDURE — 25010000002 DEXAMETHASONE PER 1 MG: Performed by: INTERNAL MEDICINE

## 2024-10-15 PROCEDURE — 25010000002 OXALIPLATIN PER 0.5 MG: Performed by: INTERNAL MEDICINE

## 2024-10-15 PROCEDURE — 25010000002 LEUCOVORIN CALCIUM PER 50 MG: Performed by: INTERNAL MEDICINE

## 2024-10-15 PROCEDURE — 85025 COMPLETE CBC W/AUTO DIFF WBC: CPT | Performed by: INTERNAL MEDICINE

## 2024-10-15 PROCEDURE — 0 DEXTROSE 5 % SOLUTION 250 ML FLEX CONT: Performed by: INTERNAL MEDICINE

## 2024-10-15 PROCEDURE — 96375 TX/PRO/DX INJ NEW DRUG ADDON: CPT

## 2024-10-15 PROCEDURE — 96413 CHEMO IV INFUSION 1 HR: CPT

## 2024-10-15 PROCEDURE — 0 DEXTROSE 5 % SOLUTION 500 ML FLEX CONT: Performed by: INTERNAL MEDICINE

## 2024-10-15 PROCEDURE — 96417 CHEMO IV INFUS EACH ADDL SEQ: CPT

## 2024-10-15 RX ORDER — ATROPINE SULFATE 1 MG/ML
0.25 INJECTION, SOLUTION INTRAMUSCULAR; INTRAVENOUS; SUBCUTANEOUS
Status: CANCELLED | OUTPATIENT
Start: 2024-10-15

## 2024-10-15 RX ORDER — DEXTROSE MONOHYDRATE 50 MG/ML
20 INJECTION, SOLUTION INTRAVENOUS ONCE
Status: COMPLETED | OUTPATIENT
Start: 2024-10-15 | End: 2024-10-15

## 2024-10-15 RX ORDER — DEXAMETHASONE SODIUM PHOSPHATE 4 MG/ML
12 INJECTION, SOLUTION INTRA-ARTICULAR; INTRALESIONAL; INTRAMUSCULAR; INTRAVENOUS; SOFT TISSUE ONCE
Status: COMPLETED | OUTPATIENT
Start: 2024-10-15 | End: 2024-10-15

## 2024-10-15 RX ORDER — PALONOSETRON 0.05 MG/ML
0.25 INJECTION, SOLUTION INTRAVENOUS ONCE
Status: COMPLETED | OUTPATIENT
Start: 2024-10-15 | End: 2024-10-15

## 2024-10-15 RX ORDER — IBUPROFEN 800 MG/1
800 TABLET, FILM COATED ORAL EVERY 8 HOURS PRN
Qty: 30 TABLET | Refills: 0 | Status: SHIPPED | OUTPATIENT
Start: 2024-10-15

## 2024-10-15 RX ORDER — PALONOSETRON 0.05 MG/ML
0.25 INJECTION, SOLUTION INTRAVENOUS ONCE
Status: CANCELLED | OUTPATIENT
Start: 2024-10-15

## 2024-10-15 RX ORDER — DEXTROSE MONOHYDRATE 50 MG/ML
20 INJECTION, SOLUTION INTRAVENOUS ONCE
Status: CANCELLED | OUTPATIENT
Start: 2024-10-15

## 2024-10-15 RX ORDER — DIPHENHYDRAMINE HYDROCHLORIDE 50 MG/ML
50 INJECTION INTRAMUSCULAR; INTRAVENOUS AS NEEDED
Status: CANCELLED | OUTPATIENT
Start: 2024-10-15

## 2024-10-15 RX ORDER — FAMOTIDINE 10 MG/ML
20 INJECTION, SOLUTION INTRAVENOUS AS NEEDED
Status: CANCELLED | OUTPATIENT
Start: 2024-10-15

## 2024-10-15 RX ADMIN — ATROPINE SULFATE 0.25 MG: 0.4 INJECTION, SOLUTION INTRAVENOUS at 12:23

## 2024-10-15 RX ADMIN — LEUCOVORIN CALCIUM 660 MG: 350 INJECTION, POWDER, LYOPHILIZED, FOR SOLUTION INTRAMUSCULAR; INTRAVENOUS at 11:35

## 2024-10-15 RX ADMIN — FLUOROURACIL 3900 MG: 50 INJECTION, SOLUTION INTRAVENOUS at 13:38

## 2024-10-15 RX ADMIN — PALONOSETRON HYDROCHLORIDE 0.25 MG: 0.25 INJECTION INTRAVENOUS at 09:19

## 2024-10-15 RX ADMIN — DEXTROSE MONOHYDRATE 20 ML/HR: 50 INJECTION, SOLUTION INTRAVENOUS at 09:18

## 2024-10-15 RX ADMIN — OXALIPLATIN 140 MG: 5 INJECTION, SOLUTION INTRAVENOUS at 09:47

## 2024-10-15 RX ADMIN — DEXAMETHASONE SODIUM PHOSPHATE 12 MG: 4 INJECTION, SOLUTION INTRA-ARTICULAR; INTRALESIONAL; INTRAMUSCULAR; INTRAVENOUS; SOFT TISSUE at 09:22

## 2024-10-15 RX ADMIN — ATROPINE SULFATE 0.25 MG: 0.4 INJECTION, SOLUTION INTRAVENOUS at 13:15

## 2024-10-15 RX ADMIN — IRINOTECAN HYDROCHLORIDE 205 MG: 20 INJECTION, SOLUTION INTRAVENOUS at 12:05

## 2024-10-15 NOTE — PROGRESS NOTES
Diagnosis: Cancer of the pancreas    Reason for Referral: Patient requested to meet with OSW.    Content of Visit: OSW met with patient at his infusion chair to review his barriers to care or unmet needs.  Patient stated he needed rental assistance and wondered if there were any resources.  OSW asked patient if her colleague had assisted him in applying to [employer].  Patient stated that he had no applications into [employer].  OSW assisted patient in applying to [employer] for financial assistance to address his rent payment.  Patient expressed appreciation for OSW's assistance today.  Patient stated he is still trying to work at least part-time at [employer].    Resources/Referrals Provided: [employer] application submitted

## 2024-10-15 NOTE — ASSESSMENT & PLAN NOTE
Patient is due for cycle 3 of modified FOLFIRINOX.  Does note some fatigue and nausea with his regimen but is able to mitigate those symptoms.  Neuropathy is stable.  ANC is borderline at 1300 but I will proceed with treatment today.  He does receive growth factor support.  I will see him back for cycle 4-day 1 with lab work prior to monitor for toxicities.

## 2024-10-15 NOTE — PROGRESS NOTES
Chief Complaint  Malignant neoplasm of head of pancreas and Malignant neoplasm of head of pancreas 09/24/2024 Neuropath    Ad, MD Ad Azevedo, Forrest Leon MD    Subjective          Josue Stern presents to [facility] for ongoing treatment of his pancreatic cancer.  He is on modified FOLFIRINOX.  He is due for cycle 3.  He has ongoing neuropathy from his medicine but stable.  Ibuprofen helps.  He notes fatigue especially for a day or 2 after finishing his treatment but he is still able to perform his ADLs including working.  He has some nausea but antiemetics are effective.  He is eating and drinking normally.  He reports good appetite.    Oncology/Hematology History   Malignant neoplasm of head of pancreas   9/5/2024 Initial Diagnosis    Malignant neoplasm of pancreas     9/5/2024 -  Chemotherapy    OP CENTRAL VENOUS ACCESS DEVICE Access, Care, and Maintenance (CVAD)     9/17/2024 -  Chemotherapy    OP PANCREATIC mFOLFIRINOX (OXALIplatin / Leucovorin / Irinotecan / Fluorouracil CIV)         Hello  Current Outpatient Medications on File Prior to Visit   Medication Sig Dispense Refill    albuterol sulfate  (90 Base) MCG/ACT inhaler Inhale 2 puffs Every 4 (Four) Hours As Needed for Wheezing. 18 g 0    amLODIPine (Norvasc) 5 MG tablet Take 1 tablet by mouth Daily.      amoxicillin (AMOXIL) 500 MG capsule Take 1 capsule by mouth 3 times a day.      azithromycin (Zithromax Z-Tomasz) 250 MG tablet Take 2 tablets by mouth on day 1, then 1 tablet daily on days 2-5 6 tablet 0    buprenorphine-naloxone (Suboxone) 8-2 MG film film Place 1 film under the tongue 2 (Two) Times a Day.      busPIRone (BUSPAR) 15 MG tablet Take 1 tablet by mouth 3 (Three) Times a Day.      cetirizine (ZyrTEC Allergy) 10 MG tablet Take 1 tablet by mouth Daily.      DULoxetine (CYMBALTA) 30 MG capsule Take 1 capsule by mouth Daily. 30 capsule 1    gabapentin (NEURONTIN) 800 MG tablet  Take 1 tablet by mouth 3 (Three) Times a Day.      lidocaine-prilocaine (EMLA) 2.5-2.5 % cream Apply 1 Application topically to the appropriate area as directed As Needed for Injection Site Pain. 30 g 1    ondansetron (ZOFRAN) 8 MG tablet Take 1 tablet by mouth 3 (Three) Times a Day As Needed for Nausea or Vomiting. 30 tablet 5    prazosin (MINIPRESS) 1 MG capsule Take 1 capsule by mouth Every Night.      predniSONE (DELTASONE) 50 MG tablet Take 1 tablet by mouth Daily. 5 tablet 0    propranolol LA (Inderal LA) 60 MG 24 hr capsule Take 1 capsule by mouth Daily.      [DISCONTINUED] ibuprofen (ADVIL,MOTRIN) 800 MG tablet Take 1 tablet by mouth Every 8 (Eight) Hours As Needed for Mild Pain. 30 tablet 0     Current Facility-Administered Medications on File Prior to Visit   Medication Dose Route Frequency Provider Last Rate Last Admin    atropine sulfate injection 0.25 mg  0.25 mg Intravenous Q15 Min PRN Gene Blanchard MD        [COMPLETED] dexAMETHasone (DECADRON) injection 12 mg  12 mg Intravenous Once Gene Blanchard MD   12 mg at 10/15/24 0922    [COMPLETED] dextrose (D5W) 5 % infusion  20 mL/hr Intravenous Once Gene Blanchard MD 20 mL/hr at 10/15/24 0918 20 mL/hr at 10/15/24 0918    fluorouracil (ADRUCIL) 3,900 mg in sodium chloride 0.9 % 138 mL chemo infusion - FOR HOME USE  3,900 mg Intravenous Once Gene Blanchard MD        irinotecan (CAMPTOSAR) 205 mg in dextrose (D5W) 5 % 560.3 mL chemo IVPB  112.5 mg/m2 (Treatment Plan Recorded) Intravenous Once Gene Blanchard MD        leucovorin 660 mg in dextrose (D5W) 5 % 308 mL IVPB  360 mg/m2 (Treatment Plan Recorded) Intravenous Once Gene Blanchard MD        OXALIplatin (ELOXATIN) 140 mg in dextrose (D5W) 5 % 303 mL chemo IVPB  76.5 mg/m2 (Treatment Plan Recorded) Intravenous Once Gene Blanchard MD        [COMPLETED] palonosetron (ALOXI) injection 0.25 mg  0.25 mg Intravenous Once Gene Blanchard MD   0.25 mg at 10/15/24 0919    [DISCONTINUED]  dexAMETHasone (DECADRON) IVPB 12 mg  12 mg Intravenous Once Gene Blanchard MD           Allergies   Allergen Reactions    Aspirin Nausea Only     Past Medical History:   Diagnosis Date    Allergies     Asthma     GERD (gastroesophageal reflux disease)     Hypertension     Malignant neoplasm of pancreas 09/05/2024     Past Surgical History:   Procedure Laterality Date    HERNIA REPAIR       Social History     Socioeconomic History    Marital status: Single   Tobacco Use    Smoking status: Every Day     Current packs/day: 0.50     Types: Cigarettes    Smokeless tobacco: Never   Vaping Use    Vaping status: Every Day   Substance and Sexual Activity    Alcohol use: Never    Drug use: Never    Sexual activity: Defer     History reviewed. No pertinent family history.    Objective   Physical Exam  Vitals reviewed. Exam conducted with a chaperone present.   Cardiovascular:      Rate and Rhythm: Normal rate and regular rhythm.      Heart sounds: Normal heart sounds. No murmur heard.     No gallop.   Pulmonary:      Effort: Pulmonary effort is normal.      Breath sounds: Normal breath sounds.      Comments: Port-A-Cath  Abdominal:      General: Bowel sounds are normal.   Lymphadenopathy:      Cervical: No cervical adenopathy.   Psychiatric:         Mood and Affect: Mood normal.         Behavior: Behavior normal.         Vitals:    10/15/24 0834   BP: 164/96   Pulse: 60   Resp: 18   Temp: 97.2 °F (36.2 °C)   TempSrc: Temporal   SpO2: 100%   Weight: 71.7 kg (158 lb)   PainSc: 0-No pain     ECOG score: 0         PHQ-9 Total Score:                    Result Review :   The following data was reviewed by: Gene Blanchard MD on 10/15/2024:  Lab Results   Component Value Date    HGB 10.7 (L) 10/15/2024    HCT 32.3 (L) 10/15/2024    MCV 85.7 10/15/2024     (L) 10/15/2024    WBC 3.76 10/15/2024    NEUTROABS 1.30 (L) 10/15/2024    LYMPHSABS 1.93 10/15/2024    MONOSABS 0.49 10/15/2024    EOSABS 0.02 10/15/2024    BASOSABS  0.01 10/15/2024     Lab Results   Component Value Date    GLUCOSE 134 (H) 10/15/2024    BUN 10 10/15/2024    CREATININE 1.02 10/15/2024     10/15/2024    K 3.7 10/15/2024     (H) 10/15/2024    CO2 25.2 10/15/2024    CALCIUM 8.7 10/15/2024    PROTEINTOT 5.8 (L) 10/15/2024    ALBUMIN 3.6 10/15/2024    BILITOT 0.7 10/15/2024    ALKPHOS 135 (H) 10/15/2024    AST 47 (H) 10/15/2024    ALT 35 10/15/2024     Lab Results   Component Value Date    MG 1.7 12/10/2021    PHOS 3.4 12/10/2021    FREET4 1.74 06/30/2020    TSH 0.256 (L) 12/06/2021     Lab Results   Component Value Date    IRON 15 (L) 12/08/2021    LABIRON 7 (L) 12/08/2021    TRANSFERRIN 150 (L) 12/08/2021    TIBC 224 (L) 12/08/2021     Lab Results   Component Value Date    FERRITIN 75 12/21/2020    LMPWKTPB20 1,022 (H) 12/08/2021    FOLATE 10.90 12/08/2021     Lab Results   Component Value Date     1,386.0 (H) 09/05/2024             Assessment and Plan    Diagnoses and all orders for this visit:    1. Malignant neoplasm of head of pancreas (Primary)  Assessment & Plan:  Patient is due for cycle 3 of modified FOLFIRINOX.  Does note some fatigue and nausea with his regimen but is able to mitigate those symptoms.  Neuropathy is stable.  ANC is borderline at 1300 but I will proceed with treatment today.  He does receive growth factor support.  I will see him back for cycle 4-day 1 with lab work prior to monitor for toxicities.    Orders:  -     Infusion Appointment Request 10; Future    2. Neuropathy associated with cancer  Assessment & Plan:  Continue duloxetine.  Refilled ibuprofen as needed.    Orders:  -     ibuprofen (ADVIL,MOTRIN) 800 MG tablet; Take 1 tablet by mouth Every 8 (Eight) Hours As Needed for Mild Pain.  Dispense: 30 tablet; Refill: 0    Other orders  -     OK To Treat  -     Cancel: dextrose (D5W) 5 % infusion  -     Cancel: palonosetron (ALOXI) injection 0.25 mg  -     Cancel: dexAMETHasone (DECADRON) 12 mg in sodium chloride 0.9 %  IVPB  -     Cancel: OXALIplatin (ELOXATIN) 140 mg in dextrose (D5W) 5 % 278 mL chemo IVPB  -     Cancel: leucovorin 660 mg in dextrose (D5W) 5 % 316 mL IVPB  -     Cancel: irinotecan (CAMPTOSAR) 205 mg in dextrose (D5W) 5 % 510.3 mL chemo IVPB  -     Cancel: atropine injection 0.25 mg  -     Cancel: fluorouracil (ADRUCIL) 3,930 mg in sodium chloride 0.9 % 78.6 mL chemo infusion - FOR HOME USE  -     Hydrocortisone Sod Suc (PF) (Solu-CORTEF) injection 100 mg  -     diphenhydrAMINE (BENADRYL) injection 50 mg  -     famotidine (PEPCID) injection 20 mg            Patient Follow Up: Cycle 4-day 1    Patient was given instructions and counseling regarding his condition or for health maintenance advice. Please see specific information pulled into the AVS if appropriate.     Gene Blanchard MD    10/15/2024

## 2024-10-16 ENCOUNTER — TELEPHONE (OUTPATIENT)
Dept: ONCOLOGY | Facility: HOSPITAL | Age: 69
End: 2024-10-16
Payer: MEDICARE

## 2024-10-16 NOTE — TELEPHONE ENCOUNTER
OSW contacted patient to inform him that [employer] representative had emailed OSW to request a copy of patient's first page of his lease agreement.  Patient stated he did not have that page but he would go to the apartment managers' office when he felt better either today or tomorrow and request a copy of the first page of his lease agreement.  Patient expressed appreciation for OSW assisting him with applying for rental assistance.

## 2024-10-17 ENCOUNTER — HOSPITAL ENCOUNTER (OUTPATIENT)
Dept: ONCOLOGY | Facility: HOSPITAL | Age: 69
Discharge: HOME | End: 2024-10-17
Payer: MEDICARE

## 2024-10-17 ENCOUNTER — DOCUMENTATION (OUTPATIENT)
Dept: ONCOLOGY | Facility: HOSPITAL | Age: 69
End: 2024-10-17
Payer: MEDICARE

## 2024-10-17 DIAGNOSIS — C25.0 MALIGNANT NEOPLASM OF HEAD OF PANCREAS: Primary | ICD-10-CM

## 2024-10-17 PROCEDURE — 25010000002 HEPARIN LOCK FLUSH PER 10 UNITS: Performed by: INTERNAL MEDICINE

## 2024-10-17 RX ORDER — SODIUM CHLORIDE 0.9 % (FLUSH) 0.9 %
20 SYRINGE (ML) INJECTION AS NEEDED
Status: DISCONTINUED | OUTPATIENT
Start: 2024-10-17 | End: 2024-10-18 | Stop reason: HOSPADM

## 2024-10-17 RX ORDER — HEPARIN SODIUM (PORCINE) LOCK FLUSH IV SOLN 100 UNIT/ML 100 UNIT/ML
500 SOLUTION INTRAVENOUS AS NEEDED
OUTPATIENT
Start: 2024-10-17

## 2024-10-17 RX ORDER — SODIUM CHLORIDE 0.9 % (FLUSH) 0.9 %
20 SYRINGE (ML) INJECTION AS NEEDED
OUTPATIENT
Start: 2024-10-17

## 2024-10-17 RX ORDER — HEPARIN SODIUM (PORCINE) LOCK FLUSH IV SOLN 100 UNIT/ML 100 UNIT/ML
500 SOLUTION INTRAVENOUS AS NEEDED
Status: DISCONTINUED | OUTPATIENT
Start: 2024-10-17 | End: 2024-10-18 | Stop reason: HOSPADM

## 2024-10-17 RX ADMIN — HEPARIN 500 UNITS: 100 SYRINGE at 14:06

## 2024-10-17 RX ADMIN — Medication 20 ML: at 14:06

## 2024-10-17 NOTE — PROGRESS NOTES
Diagnosis: Pancreatic cancer    Reason for Referral: Community resources    Content of Visit: Patient is requesting rental assistance.  Patient requested to meet with OSW in order to present his first page of his lease agreement for OSW  to submit to [employer] on his behalf.  OSW made patient aware that Anastasia Hayes asked in an email for any other bills he would like to have paid. Patient stated he has a car insurance payment that he would like to submit.  Patient stated next week he would bring in that documentation.  Patient expressed appreciation for OSW assisting him with receiving financial assistance.    Resources/Referrals Provided: [employer] for rental assistance

## 2024-10-23 ENCOUNTER — DOCUMENTATION (OUTPATIENT)
Dept: ONCOLOGY | Facility: HOSPITAL | Age: 69
End: 2024-10-23
Payer: MEDICARE

## 2024-10-23 NOTE — PROGRESS NOTES
Patient requested OSW to contact [employer] and confirm he is approved for rental assistance. OSW contacted [employer] and they replied that the check has been requested and will communicate with OSW when payment is sent. Patient expressed appreciation and agreed OSW will notify him when an email is received stating the check has been sent so the patient can follow up. This patient is concerned about protecting his credit and relationship with his landlord.

## 2024-10-29 ENCOUNTER — DOCUMENTATION (OUTPATIENT)
Dept: ONCOLOGY | Facility: HOSPITAL | Age: 69
End: 2024-10-29
Payer: MEDICARE

## 2024-10-29 ENCOUNTER — OFFICE VISIT (OUTPATIENT)
Dept: ONCOLOGY | Facility: HOSPITAL | Age: 69
End: 2024-10-29
Payer: MEDICARE

## 2024-10-29 ENCOUNTER — HOSPITAL ENCOUNTER (OUTPATIENT)
Dept: ONCOLOGY | Facility: HOSPITAL | Age: 69
Discharge: HOME | End: 2024-10-29
Payer: MEDICARE

## 2024-10-29 VITALS
OXYGEN SATURATION: 98 % | SYSTOLIC BLOOD PRESSURE: 155 MMHG | BODY MASS INDEX: 24.86 KG/M2 | RESPIRATION RATE: 16 BRPM | WEIGHT: 154 LBS | DIASTOLIC BLOOD PRESSURE: 96 MMHG | HEART RATE: 54 BPM | TEMPERATURE: 96.9 F

## 2024-10-29 VITALS
DIASTOLIC BLOOD PRESSURE: 96 MMHG | RESPIRATION RATE: 16 BRPM | OXYGEN SATURATION: 98 % | HEART RATE: 54 BPM | TEMPERATURE: 96.9 F | WEIGHT: 154.98 LBS | SYSTOLIC BLOOD PRESSURE: 155 MMHG | BODY MASS INDEX: 25.01 KG/M2

## 2024-10-29 DIAGNOSIS — C25.0 MALIGNANT NEOPLASM OF HEAD OF PANCREAS: Primary | ICD-10-CM

## 2024-10-29 DIAGNOSIS — G63 NEUROPATHY ASSOCIATED WITH CANCER: ICD-10-CM

## 2024-10-29 DIAGNOSIS — D70.1 CHEMOTHERAPY-INDUCED NEUTROPENIA: ICD-10-CM

## 2024-10-29 DIAGNOSIS — D69.6 THROMBOCYTOPENIA: ICD-10-CM

## 2024-10-29 DIAGNOSIS — C80.1 NEUROPATHY ASSOCIATED WITH CANCER: ICD-10-CM

## 2024-10-29 DIAGNOSIS — T45.1X5A CHEMOTHERAPY-INDUCED NEUTROPENIA: ICD-10-CM

## 2024-10-29 LAB
ALBUMIN SERPL-MCNC: 3.4 G/DL (ref 3.5–5.2)
ALBUMIN/GLOB SERPL: 1.8 G/DL
ALP SERPL-CCNC: 142 U/L (ref 39–117)
ALT SERPL W P-5'-P-CCNC: 32 U/L (ref 1–41)
ANION GAP SERPL CALCULATED.3IONS-SCNC: 1.9 MMOL/L (ref 5–15)
AST SERPL-CCNC: 40 U/L (ref 1–40)
BASOPHILS # BLD AUTO: 0.01 10*3/MM3 (ref 0–0.2)
BASOPHILS NFR BLD AUTO: 0.4 % (ref 0–1.5)
BILIRUB SERPL-MCNC: 0.4 MG/DL (ref 0–1.2)
BUN SERPL-MCNC: 10 MG/DL (ref 8–23)
BUN/CREAT SERPL: 10.5 (ref 7–25)
CALCIUM SPEC-SCNC: 8.1 MG/DL (ref 8.6–10.5)
CHLORIDE SERPL-SCNC: 111 MMOL/L (ref 98–107)
CO2 SERPL-SCNC: 26.1 MMOL/L (ref 22–29)
CREAT SERPL-MCNC: 0.95 MG/DL (ref 0.76–1.27)
DEPRECATED RDW RBC AUTO: 43.1 FL (ref 37–54)
EGFRCR SERPLBLD CKD-EPI 2021: 86.1 ML/MIN/1.73
EOSINOPHIL # BLD AUTO: 0.04 10*3/MM3 (ref 0–0.4)
EOSINOPHIL NFR BLD AUTO: 1.5 % (ref 0.3–6.2)
ERYTHROCYTE [DISTWIDTH] IN BLOOD BY AUTOMATED COUNT: 13.4 % (ref 12.3–15.4)
GLOBULIN UR ELPH-MCNC: 1.9 GM/DL
GLUCOSE SERPL-MCNC: 102 MG/DL (ref 65–99)
HCT VFR BLD AUTO: 32.1 % (ref 37.5–51)
HGB BLD-MCNC: 10.5 G/DL (ref 13–17.7)
IMM GRANULOCYTES # BLD AUTO: 0.01 10*3/MM3 (ref 0–0.05)
IMM GRANULOCYTES NFR BLD AUTO: 0.4 % (ref 0–0.5)
LYMPHOCYTES # BLD AUTO: 1.42 10*3/MM3 (ref 0.7–3.1)
LYMPHOCYTES NFR BLD AUTO: 53 % (ref 19.6–45.3)
MCH RBC QN AUTO: 28.3 PG (ref 26.6–33)
MCHC RBC AUTO-ENTMCNC: 32.7 G/DL (ref 31.5–35.7)
MCV RBC AUTO: 86.5 FL (ref 79–97)
MONOCYTES # BLD AUTO: 0.41 10*3/MM3 (ref 0.1–0.9)
MONOCYTES NFR BLD AUTO: 15.3 % (ref 5–12)
NEUTROPHILS NFR BLD AUTO: 0.79 10*3/MM3 (ref 1.7–7)
NEUTROPHILS NFR BLD AUTO: 29.4 % (ref 42.7–76)
PLATELET # BLD AUTO: 93 10*3/MM3 (ref 140–450)
PMV BLD AUTO: 11.1 FL (ref 6–12)
POTASSIUM SERPL-SCNC: 3.8 MMOL/L (ref 3.5–5.2)
PROT SERPL-MCNC: 5.3 G/DL (ref 6–8.5)
RBC # BLD AUTO: 3.71 10*6/MM3 (ref 4.14–5.8)
SODIUM SERPL-SCNC: 139 MMOL/L (ref 136–145)
WBC NRBC COR # BLD AUTO: 2.68 10*3/MM3 (ref 3.4–10.8)

## 2024-10-29 PROCEDURE — 80053 COMPREHEN METABOLIC PANEL: CPT | Performed by: INTERNAL MEDICINE

## 2024-10-29 PROCEDURE — 85025 COMPLETE CBC W/AUTO DIFF WBC: CPT | Performed by: INTERNAL MEDICINE

## 2024-10-29 PROCEDURE — 25010000002 HEPARIN LOCK FLUSH PER 10 UNITS: Performed by: INTERNAL MEDICINE

## 2024-10-29 PROCEDURE — 36591 DRAW BLOOD OFF VENOUS DEVICE: CPT

## 2024-10-29 RX ORDER — HEPARIN SODIUM (PORCINE) LOCK FLUSH IV SOLN 100 UNIT/ML 100 UNIT/ML
500 SOLUTION INTRAVENOUS AS NEEDED
Status: DISCONTINUED | OUTPATIENT
Start: 2024-10-29 | End: 2024-10-30 | Stop reason: HOSPADM

## 2024-10-29 RX ORDER — HEPARIN SODIUM (PORCINE) LOCK FLUSH IV SOLN 100 UNIT/ML 100 UNIT/ML
500 SOLUTION INTRAVENOUS AS NEEDED
OUTPATIENT
Start: 2024-10-29

## 2024-10-29 RX ORDER — SODIUM CHLORIDE 0.9 % (FLUSH) 0.9 %
20 SYRINGE (ML) INJECTION AS NEEDED
OUTPATIENT
Start: 2024-10-29

## 2024-10-29 RX ORDER — SODIUM CHLORIDE 0.9 % (FLUSH) 0.9 %
20 SYRINGE (ML) INJECTION AS NEEDED
Status: DISCONTINUED | OUTPATIENT
Start: 2024-10-29 | End: 2024-10-30 | Stop reason: HOSPADM

## 2024-10-29 RX ADMIN — HEPARIN 500 UNITS: 100 SYRINGE at 09:02

## 2024-10-29 RX ADMIN — Medication 20 ML: at 09:02

## 2024-10-29 NOTE — PROGRESS NOTES
Diagnosis: Pancreatic cancer    Reason for Referral: Financial assistance    Content of Visit: OSW met with Mr. Stern in the medical oncology infusion center this morning to follow-up with him regarding his financial assistance request through [employer].  Mr. Stern is requesting a status update regarding his rental assistance, as he has not heard from [employer] himself.    Additionally, he provided OSW with a copy of his car payment statement to submit to [employer] this morning.  OSW submitted this statement to Nicolle at [employer], and requested she please provide us with a status update on his rental assistance.    Advised Mr. Stern that if he needs ongoing financial assistance while undergoing treatment, OSW may have other resources in addition to [employer].  OSW will plan to follow-up with him during his next treatment to further review these resources.  He verbalized understanding and expressed appreciation.  OSW support remains available.    Update 10/30/2024: OSW received confirmation from Nicolle at [employer] that the check for rent went out on 10/25/2024 for $550 to [place].  Additionally, Nicolle made the car payment for $352.89 at [facility].  OSW contacted Mr. Stern via telephone this morning to make him aware.  He verbalized understanding and expressed appreciation.  OSW support remains available.    Resources/Referrals Provided: [employer]- car payment

## 2024-10-29 NOTE — ASSESSMENT & PLAN NOTE
ANC is 700.  This should improve with time.  He denies any febrile or infectious complications.  I will add Udenyca to his regimen moving forward

## 2024-10-29 NOTE — ASSESSMENT & PLAN NOTE
Patient is due for cycle 4 however his blood counts are inadequate for treatment including ANC and platelet count.  I will defer therapy to allow for appropriate bone marrow recovery.  I will add growth factor to his regimen moving forward.  He will return in 2 weeks to resume therapy if blood counts are better.  He does report a tooth that may need extraction.  Blood counts are reasonable for that procedure if the dentist wishes to proceed.

## 2024-10-29 NOTE — ASSESSMENT & PLAN NOTE
Secondary to chemotherapy.  He denies bruising or bleeding.  This should improve with time.  Repeat CBC next visit.

## 2024-10-29 NOTE — PROGRESS NOTES
Chief Complaint  Malignant neoplasm of head of pancreas    Forrest Duong MD Smith, Forrest Leon MD    Subjective          Josue Stern presents to [facility] for ongoing treatment of his pancreatic cancer.  He is on modified FOLFIRINOX.  He notes some nausea with treatments but antiemetics are effective.  He continues to have neuropathy in his feet this has been a longstanding problem but worse on chemotherapy.  Started on Cymbalta but he does not feel its helped.  He reports a tooth that is broken and the dentist is considering extraction    Oncology/Hematology History   Malignant neoplasm of head of pancreas   9/5/2024 Initial Diagnosis    Malignant neoplasm of pancreas     9/5/2024 -  Chemotherapy    OP CENTRAL VENOUS ACCESS DEVICE Access, Care, and Maintenance (CVAD)     9/17/2024 -  Chemotherapy    OP PANCREATIC mFOLFIRINOX (OXALIplatin / Leucovorin / Irinotecan / Fluorouracil CIV)           Current Outpatient Medications on File Prior to Visit   Medication Sig Dispense Refill    albuterol sulfate  (90 Base) MCG/ACT inhaler Inhale 2 puffs Every 4 (Four) Hours As Needed for Wheezing. 18 g 0    amLODIPine (Norvasc) 5 MG tablet Take 1 tablet by mouth Daily.      amoxicillin (AMOXIL) 500 MG capsule Take 1 capsule by mouth 3 times a day.      azithromycin (Zithromax Z-Tomasz) 250 MG tablet Take 2 tablets by mouth on day 1, then 1 tablet daily on days 2-5 6 tablet 0    buprenorphine-naloxone (Suboxone) 8-2 MG film film Place 1 film under the tongue 2 (Two) Times a Day.      busPIRone (BUSPAR) 15 MG tablet Take 1 tablet by mouth 3 (Three) Times a Day.      cetirizine (ZyrTEC Allergy) 10 MG tablet Take 1 tablet by mouth Daily.      DULoxetine (CYMBALTA) 30 MG capsule Take 1 capsule by mouth Daily. 30 capsule 1    gabapentin (NEURONTIN) 800 MG tablet Take 1 tablet by mouth 3 (Three) Times a Day.      ibuprofen (ADVIL,MOTRIN) 800 MG tablet Take 1 tablet by  mouth Every 8 (Eight) Hours As Needed for Mild Pain. 30 tablet 0    lidocaine-prilocaine (EMLA) 2.5-2.5 % cream Apply 1 Application topically to the appropriate area as directed As Needed for Injection Site Pain. 30 g 1    ondansetron (ZOFRAN) 8 MG tablet Take 1 tablet by mouth 3 (Three) Times a Day As Needed for Nausea or Vomiting. 30 tablet 5    prazosin (MINIPRESS) 1 MG capsule Take 1 capsule by mouth Every Night.      predniSONE (DELTASONE) 50 MG tablet Take 1 tablet by mouth Daily. 5 tablet 0    propranolol LA (Inderal LA) 60 MG 24 hr capsule Take 1 capsule by mouth Daily.       No current facility-administered medications on file prior to visit.       Allergies   Allergen Reactions    Aspirin Nausea Only     Past Medical History:   Diagnosis Date    Allergies     Asthma     GERD (gastroesophageal reflux disease)     Hypertension     Malignant neoplasm of pancreas 09/05/2024     Past Surgical History:   Procedure Laterality Date    HERNIA REPAIR       Social History     Socioeconomic History    Marital status: Single   Tobacco Use    Smoking status: Every Day     Current packs/day: 0.50     Types: Cigarettes    Smokeless tobacco: Never   Vaping Use    Vaping status: Every Day   Substance and Sexual Activity    Alcohol use: Never    Drug use: Never    Sexual activity: Defer     History reviewed. No pertinent family history.    Objective   Physical Exam  Vitals reviewed. Exam conducted with a chaperone present.   HENT:      Mouth/Throat:      Comments: Broken tooth in the right maxilla  Cardiovascular:      Rate and Rhythm: Normal rate and regular rhythm.      Heart sounds: Normal heart sounds. No murmur heard.     No gallop.   Pulmonary:      Effort: Pulmonary effort is normal.      Breath sounds: Normal breath sounds.      Comments: Port-A-Cath  Abdominal:      General: Bowel sounds are normal.   Musculoskeletal:      Right lower leg: No edema.      Left lower leg: No edema.   Lymphadenopathy:      Cervical: No  cervical adenopathy.   Psychiatric:         Mood and Affect: Mood normal.         Behavior: Behavior normal.         Vitals:    10/29/24 0834   BP: 155/96   Pulse: 54   Resp: 16   Temp: 96.9 °F (36.1 °C)   TempSrc: Temporal   SpO2: 98%   Weight: 69.9 kg (154 lb)   PainSc:   6   PainLoc: Comment: Lower back, toe and feet.     ECOG score: 0         PHQ-9 Total Score:                    Result Review :   The following data was reviewed by: Gene Blanchard MD on 10/29/2024:  Lab Results   Component Value Date    HGB 10.5 (L) 10/29/2024    HCT 32.1 (L) 10/29/2024    MCV 86.5 10/29/2024    PLT 93 (L) 10/29/2024    WBC 2.68 (L) 10/29/2024    NEUTROABS 0.79 (L) 10/29/2024    LYMPHSABS 1.42 10/29/2024    MONOSABS 0.41 10/29/2024    EOSABS 0.04 10/29/2024    BASOSABS 0.01 10/29/2024     Lab Results   Component Value Date    GLUCOSE 134 (H) 10/15/2024    BUN 10 10/15/2024    CREATININE 1.02 10/15/2024     10/15/2024    K 3.7 10/15/2024     (H) 10/15/2024    CO2 25.2 10/15/2024    CALCIUM 8.7 10/15/2024    PROTEINTOT 5.8 (L) 10/15/2024    ALBUMIN 3.6 10/15/2024    BILITOT 0.7 10/15/2024    ALKPHOS 135 (H) 10/15/2024    AST 47 (H) 10/15/2024    ALT 35 10/15/2024     Lab Results   Component Value Date    MG 1.7 12/10/2021    PHOS 3.4 12/10/2021    FREET4 1.74 06/30/2020    TSH 0.256 (L) 12/06/2021     Lab Results   Component Value Date    IRON 15 (L) 12/08/2021    LABIRON 7 (L) 12/08/2021    TRANSFERRIN 150 (L) 12/08/2021    TIBC 224 (L) 12/08/2021     Lab Results   Component Value Date    FERRITIN 75 12/21/2020    PAWPCWQQ41 1,022 (H) 12/08/2021    FOLATE 10.90 12/08/2021     Lab Results   Component Value Date     1,386.0 (H) 09/05/2024             Assessment and Plan    Diagnoses and all orders for this visit:    1. Malignant neoplasm of head of pancreas (Primary)  Assessment & Plan:  Patient is due for cycle 4 however his blood counts are inadequate for treatment including ANC and platelet count.  I will  defer therapy to allow for appropriate bone marrow recovery.  I will add growth factor to his regimen moving forward.  He will return in 2 weeks to resume therapy if blood counts are better.  He does report a tooth that may need extraction.  Blood counts are reasonable for that procedure if the dentist wishes to proceed.    Orders:  -     CBC and Differential; Future  -     Comprehensive metabolic panel; Future    2. Neuropathy associated with cancer  Assessment & Plan:  Increase duloxetine to 60 mg daily.      3. Chemotherapy-induced neutropenia  Assessment & Plan:  ANC is 700.  This should improve with time.  He denies any febrile or infectious complications.  I will add Udenyca to his regimen moving forward      4. Thrombocytopenia  Assessment & Plan:  Secondary to chemotherapy.  He denies bruising or bleeding.  This should improve with time.  Repeat CBC next visit.              Patient Follow Up: 2 weeks    Patient was given instructions and counseling regarding his condition or for health maintenance advice. Please see specific information pulled into the AVS if appropriate.     Gene Blanchard MD    10/29/2024

## 2024-11-07 ENCOUNTER — TELEPHONE (OUTPATIENT)
Dept: ONCOLOGY | Facility: HOSPITAL | Age: 69
End: 2024-11-07
Payer: MEDICARE

## 2024-11-07 NOTE — TELEPHONE ENCOUNTER
Caller: Josue Stern    Relationship: Self    Best call back number: [phone]    What is the best time to reach you: ANYTIME    Who are you requesting to speak with (clinical staff, provider,  specific staff member): CLINICAL      What was the call regarding: PT ASKING IF HE SHOULD BE COMING BY FOR A LAB DRAW PRIOR TO HIS 11/12 TREATMENT.  PT HAD ONE SCHEDULED FOR 11/11 BUT IT WAS CANCELED.    PLEASE ADVISE

## 2024-11-07 NOTE — TELEPHONE ENCOUNTER
LEFT MESSAGE FOR PATIENT IN REGARDS TO LAB APPOINTMENT, I HAVE HIM SCHEDULED FOR 11/11/2024 AT 3:45 PM, ASKED FOR PATIENT TO CALL OFFICE BACK IF THAT DIDN'T WORK FOR HIM.

## 2024-11-11 ENCOUNTER — TELEPHONE (OUTPATIENT)
Dept: ONCOLOGY | Facility: HOSPITAL | Age: 69
End: 2024-11-11
Payer: MEDICARE

## 2024-11-11 NOTE — TELEPHONE ENCOUNTER
LEFT MESSAGE FOR PATIENT IN REGARDS TO LAB APPOINTMENT FOR TODAY. WE WILL JUST DRAW LABS WITH INFUSION TOMORROW.

## 2024-11-12 ENCOUNTER — HOSPITAL ENCOUNTER (OUTPATIENT)
Dept: ONCOLOGY | Facility: HOSPITAL | Age: 69
Discharge: HOME | End: 2024-11-12
Payer: MEDICARE

## 2024-11-12 ENCOUNTER — DOCUMENTATION (OUTPATIENT)
Dept: NUTRITION | Facility: HOSPITAL | Age: 69
End: 2024-11-12
Payer: MEDICARE

## 2024-11-12 ENCOUNTER — OFFICE VISIT (OUTPATIENT)
Dept: ONCOLOGY | Facility: HOSPITAL | Age: 69
End: 2024-11-12
Payer: MEDICARE

## 2024-11-12 ENCOUNTER — DOCUMENTATION (OUTPATIENT)
Dept: ONCOLOGY | Facility: HOSPITAL | Age: 69
End: 2024-11-12
Payer: MEDICARE

## 2024-11-12 VITALS
WEIGHT: 150 LBS | DIASTOLIC BLOOD PRESSURE: 94 MMHG | SYSTOLIC BLOOD PRESSURE: 147 MMHG | BODY MASS INDEX: 24.21 KG/M2 | RESPIRATION RATE: 18 BRPM | HEART RATE: 56 BPM | OXYGEN SATURATION: 100 % | TEMPERATURE: 97.1 F

## 2024-11-12 VITALS
DIASTOLIC BLOOD PRESSURE: 94 MMHG | WEIGHT: 150.35 LBS | OXYGEN SATURATION: 100 % | BODY MASS INDEX: 24.27 KG/M2 | HEART RATE: 56 BPM | SYSTOLIC BLOOD PRESSURE: 147 MMHG | TEMPERATURE: 97.1 F | RESPIRATION RATE: 18 BRPM

## 2024-11-12 DIAGNOSIS — C25.0 MALIGNANT NEOPLASM OF HEAD OF PANCREAS: Primary | ICD-10-CM

## 2024-11-12 DIAGNOSIS — G63 NEUROPATHY ASSOCIATED WITH CANCER: ICD-10-CM

## 2024-11-12 DIAGNOSIS — C80.1 NEUROPATHY ASSOCIATED WITH CANCER: ICD-10-CM

## 2024-11-12 LAB
ALBUMIN SERPL-MCNC: 3.9 G/DL (ref 3.5–5.2)
ALBUMIN/GLOB SERPL: 1.5 G/DL
ALP SERPL-CCNC: 193 U/L (ref 39–117)
ALT SERPL W P-5'-P-CCNC: 34 U/L (ref 1–41)
ANION GAP SERPL CALCULATED.3IONS-SCNC: 8.6 MMOL/L (ref 5–15)
AST SERPL-CCNC: 50 U/L (ref 1–40)
BASOPHILS # BLD AUTO: 0.03 10*3/MM3 (ref 0–0.2)
BASOPHILS NFR BLD AUTO: 0.6 % (ref 0–1.5)
BILIRUB SERPL-MCNC: 0.4 MG/DL (ref 0–1.2)
BUN SERPL-MCNC: 11 MG/DL (ref 8–23)
BUN/CREAT SERPL: 10.8 (ref 7–25)
CALCIUM SPEC-SCNC: 8.6 MG/DL (ref 8.6–10.5)
CHLORIDE SERPL-SCNC: 105 MMOL/L (ref 98–107)
CO2 SERPL-SCNC: 24.4 MMOL/L (ref 22–29)
CREAT SERPL-MCNC: 1.02 MG/DL (ref 0.76–1.27)
DEPRECATED RDW RBC AUTO: 46.5 FL (ref 37–54)
EGFRCR SERPLBLD CKD-EPI 2021: 79.1 ML/MIN/1.73
EOSINOPHIL # BLD AUTO: 0.06 10*3/MM3 (ref 0–0.4)
EOSINOPHIL NFR BLD AUTO: 1.1 % (ref 0.3–6.2)
ERYTHROCYTE [DISTWIDTH] IN BLOOD BY AUTOMATED COUNT: 14.6 % (ref 12.3–15.4)
GLOBULIN UR ELPH-MCNC: 2.6 GM/DL
GLUCOSE SERPL-MCNC: 104 MG/DL (ref 65–99)
HCT VFR BLD AUTO: 36.3 % (ref 37.5–51)
HGB BLD-MCNC: 11.6 G/DL (ref 13–17.7)
IMM GRANULOCYTES # BLD AUTO: 0.02 10*3/MM3 (ref 0–0.05)
IMM GRANULOCYTES NFR BLD AUTO: 0.4 % (ref 0–0.5)
LYMPHOCYTES # BLD AUTO: 2.3 10*3/MM3 (ref 0.7–3.1)
LYMPHOCYTES NFR BLD AUTO: 43.2 % (ref 19.6–45.3)
MCH RBC QN AUTO: 27.8 PG (ref 26.6–33)
MCHC RBC AUTO-ENTMCNC: 32 G/DL (ref 31.5–35.7)
MCV RBC AUTO: 87.1 FL (ref 79–97)
MONOCYTES # BLD AUTO: 0.86 10*3/MM3 (ref 0.1–0.9)
MONOCYTES NFR BLD AUTO: 16.1 % (ref 5–12)
NEUTROPHILS NFR BLD AUTO: 2.06 10*3/MM3 (ref 1.7–7)
NEUTROPHILS NFR BLD AUTO: 38.6 % (ref 42.7–76)
NRBC BLD AUTO-RTO: 0 /100 WBC (ref 0–0.2)
PLATELET # BLD AUTO: 186 10*3/MM3 (ref 140–450)
PMV BLD AUTO: 10.5 FL (ref 6–12)
POTASSIUM SERPL-SCNC: 4.5 MMOL/L (ref 3.5–5.2)
PROT SERPL-MCNC: 6.5 G/DL (ref 6–8.5)
RBC # BLD AUTO: 4.17 10*6/MM3 (ref 4.14–5.8)
SODIUM SERPL-SCNC: 138 MMOL/L (ref 136–145)
WBC NRBC COR # BLD AUTO: 5.33 10*3/MM3 (ref 3.4–10.8)

## 2024-11-12 PROCEDURE — 25010000002 DEXAMETHASONE PER 1 MG: Performed by: INTERNAL MEDICINE

## 2024-11-12 PROCEDURE — 25010000002 OXALIPLATIN PER 0.5 MG: Performed by: INTERNAL MEDICINE

## 2024-11-12 PROCEDURE — 96417 CHEMO IV INFUS EACH ADDL SEQ: CPT

## 2024-11-12 PROCEDURE — G0498 CHEMO EXTEND IV INFUS W/PUMP: HCPCS

## 2024-11-12 PROCEDURE — 80053 COMPREHEN METABOLIC PANEL: CPT | Performed by: INTERNAL MEDICINE

## 2024-11-12 PROCEDURE — 25010000003 DEXTROSE 5 % SOLUTION: Performed by: INTERNAL MEDICINE

## 2024-11-12 PROCEDURE — 96368 THER/DIAG CONCURRENT INF: CPT

## 2024-11-12 PROCEDURE — 25010000002 LEUCOVORIN CALCIUM PER 50 MG: Performed by: INTERNAL MEDICINE

## 2024-11-12 PROCEDURE — 96415 CHEMO IV INFUSION ADDL HR: CPT

## 2024-11-12 PROCEDURE — 96413 CHEMO IV INFUSION 1 HR: CPT

## 2024-11-12 PROCEDURE — 25010000002 PALONOSETRON PER 25 MCG: Performed by: INTERNAL MEDICINE

## 2024-11-12 PROCEDURE — 96366 THER/PROPH/DIAG IV INF ADDON: CPT

## 2024-11-12 PROCEDURE — 25010000002 FLUOROURACIL PER 500 MG: Performed by: INTERNAL MEDICINE

## 2024-11-12 PROCEDURE — 25010000003 DEXTROSE 5 % SOLUTION 500 ML FLEX CONT: Performed by: INTERNAL MEDICINE

## 2024-11-12 PROCEDURE — 25010000003 DEXTROSE 5 % SOLUTION 250 ML FLEX CONT: Performed by: INTERNAL MEDICINE

## 2024-11-12 PROCEDURE — 25010000002 IRINOTECAN PER 20 MG: Performed by: INTERNAL MEDICINE

## 2024-11-12 PROCEDURE — 96375 TX/PRO/DX INJ NEW DRUG ADDON: CPT

## 2024-11-12 PROCEDURE — 85025 COMPLETE CBC W/AUTO DIFF WBC: CPT | Performed by: INTERNAL MEDICINE

## 2024-11-12 RX ORDER — DIPHENHYDRAMINE HYDROCHLORIDE 50 MG/ML
50 INJECTION INTRAMUSCULAR; INTRAVENOUS AS NEEDED
Status: DISCONTINUED | OUTPATIENT
Start: 2024-11-12 | End: 2024-11-13 | Stop reason: HOSPADM

## 2024-11-12 RX ORDER — DIPHENHYDRAMINE HYDROCHLORIDE 50 MG/ML
50 INJECTION INTRAMUSCULAR; INTRAVENOUS AS NEEDED
Status: CANCELLED | OUTPATIENT
Start: 2024-11-12

## 2024-11-12 RX ORDER — FAMOTIDINE 10 MG/ML
20 INJECTION, SOLUTION INTRAVENOUS AS NEEDED
Status: CANCELLED | OUTPATIENT
Start: 2024-11-12

## 2024-11-12 RX ORDER — DEXTROSE MONOHYDRATE 50 MG/ML
20 INJECTION, SOLUTION INTRAVENOUS ONCE
Status: CANCELLED | OUTPATIENT
Start: 2024-11-12

## 2024-11-12 RX ORDER — DEXTROSE MONOHYDRATE 50 MG/ML
20 INJECTION, SOLUTION INTRAVENOUS ONCE
Status: COMPLETED | OUTPATIENT
Start: 2024-11-12 | End: 2024-11-12

## 2024-11-12 RX ORDER — DEXAMETHASONE SODIUM PHOSPHATE 4 MG/ML
12 INJECTION, SOLUTION INTRA-ARTICULAR; INTRALESIONAL; INTRAMUSCULAR; INTRAVENOUS; SOFT TISSUE ONCE
Status: COMPLETED | OUTPATIENT
Start: 2024-11-12 | End: 2024-11-12

## 2024-11-12 RX ORDER — FAMOTIDINE 10 MG/ML
20 INJECTION, SOLUTION INTRAVENOUS AS NEEDED
Status: DISCONTINUED | OUTPATIENT
Start: 2024-11-12 | End: 2024-11-13 | Stop reason: HOSPADM

## 2024-11-12 RX ORDER — PALONOSETRON 0.05 MG/ML
0.25 INJECTION, SOLUTION INTRAVENOUS ONCE
Status: CANCELLED | OUTPATIENT
Start: 2024-11-12

## 2024-11-12 RX ORDER — ATROPINE SULFATE 1 MG/ML
0.25 INJECTION, SOLUTION INTRAMUSCULAR; INTRAVENOUS; SUBCUTANEOUS
Status: CANCELLED | OUTPATIENT
Start: 2024-11-12

## 2024-11-12 RX ORDER — IBUPROFEN 800 MG/1
800 TABLET, FILM COATED ORAL EVERY 8 HOURS PRN
Qty: 30 TABLET | Refills: 1 | Status: SHIPPED | OUTPATIENT
Start: 2024-11-12

## 2024-11-12 RX ORDER — PALONOSETRON 0.05 MG/ML
0.25 INJECTION, SOLUTION INTRAVENOUS ONCE
Status: COMPLETED | OUTPATIENT
Start: 2024-11-12 | End: 2024-11-12

## 2024-11-12 RX ADMIN — PALONOSETRON HYDROCHLORIDE 0.25 MG: 0.25 INJECTION INTRAVENOUS at 09:45

## 2024-11-12 RX ADMIN — ATROPINE SULFATE 0.25 MG: 0.4 INJECTION, SOLUTION INTRAVENOUS at 12:34

## 2024-11-12 RX ADMIN — OXALIPLATIN 140 MG: 5 INJECTION, SOLUTION INTRAVENOUS at 10:05

## 2024-11-12 RX ADMIN — DEXTROSE MONOHYDRATE 20 ML/HR: 50 INJECTION, SOLUTION INTRAVENOUS at 09:37

## 2024-11-12 RX ADMIN — LEUCOVORIN CALCIUM 660 MG: 350 INJECTION, POWDER, LYOPHILIZED, FOR SOLUTION INTRAMUSCULAR; INTRAVENOUS at 12:05

## 2024-11-12 RX ADMIN — IRINOTECAN HYDROCHLORIDE 205 MG: 20 INJECTION, SOLUTION INTRAVENOUS at 12:38

## 2024-11-12 RX ADMIN — FLUOROURACIL 3900 MG: 50 INJECTION, SOLUTION INTRAVENOUS at 14:14

## 2024-11-12 RX ADMIN — DEXAMETHASONE SODIUM PHOSPHATE 12 MG: 4 INJECTION, SOLUTION INTRA-ARTICULAR; INTRALESIONAL; INTRAMUSCULAR; INTRAVENOUS; SOFT TISSUE at 09:42

## 2024-11-12 NOTE — ASSESSMENT & PLAN NOTE
"Preventive Care Visit  [facility] [place] Kristen M. Kehr, PA-C, Family Medicine  Nov 4, 2024      Assessment & Plan     Routine general medical examination at a health care facility  Health maintenance reviewed and updated.      Cervical cancer screening  Cervical high risk HPV (human papillomavirus) test positive  - HPV and Gynecologic Cytology Panel - Recommended Age 30 - 65 Years    Herpes simplex  Stable, refills given   - valACYclovir (VALTREX) 500 MG tablet; Take 1 tablet (500 mg) by mouth daily.    Urinary urgency  Avoid caffeine and carbonation.   Start therapy. Consider URO / GYN referral if needed  - Physical Therapy  Referral; Future            BMI  Estimated body mass index is 28.28 kg/m  as calculated from the following:    Height as of this encounter: 1.727 m (5' 8\").    Weight as of this encounter: 84.4 kg (186 lb).   Weight management plan: Discussed healthy diet and exercise guidelines    Counseling  Appropriate preventive services were addressed with this patient via screening, questionnaire, or discussion as appropriate for fall prevention, nutrition, physical activity, Tobacco-use cessation, social engagement, weight loss and cognition.  Checklist reviewing preventive services available has been given to the patient.  Reviewed patient's diet, addressing concerns and/or questions.           Meghna Polanco is a 41 year old, presenting for the following:  Physical        11/4/2024    11:46 AM   Additional Questions   Roomed by Chris CHAMPION  Sherri is here today for preventative appointment and will need to address the following:    HSV: managed with valtrex 500 mg daily.   Urinary urgency: 2 pregnancies in the past. She has just started to notice urgency. No incontinence.         Health Care Directive  Patient does not have a Health Care Directive: Patient states has Advance Directive and will bring in a copy to clinic.      10/30/2024   General Health   How " Continue duloxetine.  He is already on gabapentin through other physicians.  Refill Motrin as needed.   would you rate your overall physical health? Good   Feel stress (tense, anxious, or unable to sleep) To some extent      (!) STRESS CONCERN      10/30/2024   Nutrition   Three or more servings of calcium each day? Yes   Diet: Regular (no restrictions)   How many servings of fruit and vegetables per day? (!) 2-3   How many sweetened beverages each day? 0-1            10/30/2024   Exercise   Days per week of moderate/strenous exercise 0 days   Average minutes spent exercising at this level 0 min      (!) EXERCISE CONCERN      10/30/2024   Social Factors   Frequency of gathering with friends or relatives Once a week   Worry food won't last until get money to buy more No   Food not last or not have enough money for food? No   Do you have housing? (Housing is defined as stable permanent housing and does not include staying ouside in a car, in a tent, in an abandoned building, in an overnight shelter, or couch-surfing.) Yes   Are you worried about losing your housing? No   Lack of transportation? No   Unable to get utilities (heat,electricity)? No            10/30/2024   Dental   Dentist two times every year? Yes            10/30/2024   TB Screening   Were you born outside of the [place]? No          Today's PHQ-9 Score:       2024     7:49 AM   PHQ-9 SCORE   PHQ-9 Total Score MyChart 4 (Minimal depression)   PHQ-9 Total Score 4        Patient-reported         10/30/2024   Substance Use   Alcohol more than 3/day or more than 7/wk No   Do you use any other substances recreationally? (!) CANNABIS PRODUCTS        Social History     Tobacco Use    Smoking status: Former     Current packs/day: 0.00     Average packs/day: 0.1 packs/day for 3.0 years (0.3 ttl pk-yrs)     Types: Cigarettes     Start date: 1997     Quit date: 2000     Years since quittin.5     Passive exposure: Never    Smokeless tobacco: Never   Vaping Use    Vaping status: Never Used   Substance Use Topics    Alcohol use: Yes     Comment: 1  drink/day    Drug use: Yes     Types: Marijuana     Comment: occ.           11/30/2023   LAST FHS-7 RESULTS   1st degree relative breast or ovarian cancer No   Any relative bilateral breast cancer No   Any male have breast cancer No   Any ONE woman have BOTH breast AND ovarian cancer No   Any woman with breast cancer before 50yrs No   2 or more relatives with breast AND/OR ovarian cancer No   2 or more relatives with breast AND/OR bowel cancer No           Mammogram Screening - Mammogram every 1-2 years updated in Health Maintenance based on mutual decision making        10/30/2024   STI Screening   New sexual partner(s) since last STI/HIV test? (!) YES         History of abnormal Pap smear: YES - reflected in Problem List and Health Maintenance accordingly        Latest Ref Rng & Units 10/9/2023    10:23 AM 8/29/2022     1:31 PM   PAP / HPV   PAP  Negative for Intraepithelial Lesion or Malignancy (NILM)  Negative for Intraepithelial Lesion or Malignancy (NILM)    HPV 16 DNA Negative Negative  Negative    HPV 18 DNA Negative Negative  Negative    Other HR HPV Negative Positive  Positive      ASCVD Risk   The 10-year ASCVD risk score (Freya SABA, et al., 2019) is: 0.5%    Values used to calculate the score:      Age: 41 years      Sex: Female      Is Non- : No      Diabetic: No      Tobacco smoker: No      Systolic Blood Pressure: 138 mmHg      Is BP treated: No      HDL Cholesterol: 47 mg/dL      Total Cholesterol: 139 mg/dL        10/30/2024   Contraception/Family Planning   Questions about contraception or family planning (!) YES has an IUD, wondering if she should keep during perimenopausal years.            Reviewed and updated as needed this visit by Provider                    Past Medical History:   Diagnosis Date    Arthritis June 2023    Noted during a kidney stone scan in ER. 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)      Herpes simplex     Uncomplicated asthma     Exercise induced or when my allergies are really severe     Past Surgical History:   Procedure Laterality Date    APPENDECTOMY       OB History    Para Term  AB Living   1 1 1 0 0 1   SAB IAB Ectopic Multiple Live Births   0 0 0 0 1      # Outcome Date GA Lbr Robel/2nd Weight Sex Type Anes PTL Lv   1 Term 11 40w4d / 04:00 2.815 kg (6 lb 3.3 oz) M Vag-Spont EPI  VIET      Birth Comments: uncomplicated spont labor and deliv      Name: Marija      Apgar1: 7  Apgar5: 9     BP Readings from Last 3 Encounters:   24 138/84   09/15/24 118/78   24 111/67    Wt Readings from Last 3 Encounters:   24 84.4 kg (186 lb)   09/15/24 81.2 kg (179 lb)   24 84.4 kg (186 lb 1.6 oz)                  Patient Active Problem List   Diagnosis    Herpes simplex    Cervical high risk HPV (human papillomavirus) test positive    Other migraine without status migrainosus, not intractable    Chronic neck pain    Chronic bilateral low back pain, unspecified whether sciatica present     Past Surgical History:   Procedure Laterality Date    APPENDECTOMY         Social History     Tobacco Use    Smoking status: Former     Current packs/day: 0.00     Average packs/day: 0.1 packs/day for 3.0 years (0.3 ttl pk-yrs)     Types: Cigarettes     Start date: 1997     Quit date: 2000     Years since quittin.5     Passive exposure: Never    Smokeless tobacco: Never   Substance Use Topics    Alcohol use: Yes     Comment: 1 drink/day     Family History   Problem Relation Age of Onset    Asthma Mother     Hyperlipidemia Mother     Hypertension Mother     Other - See Comments Mother         Factor II    Hypertension Father     Hyperlipidemia Father     Lymphoma Father     Other Cancer Father         Non-Hodgkin lymphoma in spleen in .    Heart Failure Maternal Grandmother     Diabetes Maternal Grandmother     Heart Disease Maternal Grandfather      "Cerebrovascular Disease Maternal Grandfather     Heart Failure Paternal Grandmother     Diabetes Paternal Grandfather     Heart Disease Paternal Grandfather     Other - See Comments Sister         Factor II    Asthma Son     Allergies Son         seasonal, peanut allergy    Breast Cancer Other         Age 50    Breast Cancer Other     Breast Cancer Other          Current Outpatient Medications   Medication Sig Dispense Refill    albuterol (PROAIR HFA/PROVENTIL HFA/VENTOLIN HFA) 108 (90 Base) MCG/ACT inhaler Inhale 2 puffs into the lungs every 6 hours as needed for wheezing, cough or shortness of breath. 8.5 g 0    cetirizine (ZYRTEC) 10 MG tablet Take 10 mg by mouth      cyclobenzaprine (FLEXERIL) 10 MG tablet Take 1 tablet (10 mg) by mouth 2 times daily as needed for muscle spasms 14 tablet 0    cyclobenzaprine (FLEXERIL) 5 MG tablet Take 1-2 tablets (5-10 mg) by mouth 3 times daily as needed for muscle spasms 30 tablet 0    levonorgestrel (MIRENA) 52 MG (20 mcg/day) IUD by Intrauterine route once      rizatriptan (MAXALT) 10 MG tablet Take 1 tablet (10 mg) by mouth at onset of headache for migraine May repeat in 2 hours. Max 3 tablets/24 hours. 18 tablet 5    valACYclovir (VALTREX) 500 MG tablet Take 1 tablet (500 mg) by mouth daily. 90 tablet 3     Allergies   Allergen Reactions    Nuts Hives and Itching    Animal Dander Hives    Grass Extracts [Gramineae Pollens] Cough, Itching and Other (See Comments)     Recent Labs   Lab Test 10/09/23  1028   LDL 81   HDL 47*   TRIG 57   CR 0.81   GFRESTIMATED >90   POTASSIUM 4.2   TSH 0.83          Review of Systems  Constitutional, HEENT, cardiovascular, pulmonary, GI, , musculoskeletal, neuro, skin, endocrine and psych systems are negative, except as otherwise noted.     Objective    Exam  /84   Pulse 72   Temp (!) 96.3  F (35.7  C) (Tympanic)   Resp 16   Ht 1.727 m (5' 8\")   Wt 84.4 kg (186 lb)   LMP  (LMP Unknown)   SpO2 99%   Breastfeeding No   BMI " "28.28 kg/m     Estimated body mass index is 28.28 kg/m  as calculated from the following:    Height as of this encounter: 1.727 m (5' 8\").    Weight as of this encounter: 84.4 kg (186 lb).    Physical Exam  GENERAL: alert and no distress  EYES: Eyes grossly normal to inspection, PERRL and conjunctivae and sclerae normal  HENT: ear canals and TM's normal, nose and mouth without ulcers or lesions  NECK: no adenopathy, no asymmetry, masses, or scars  RESP: lungs clear to auscultation - no rales, rhonchi or wheezes  BREAST: normal without masses, tenderness or nipple discharge and no palpable axillary masses or adenopathy  CV: regular rate and rhythm, normal S1 S2, no S3 or S4, no murmur, click or rub, no peripheral edema  ABDOMEN: soft, nontender, no hepatosplenomegaly, no masses and bowel sounds normal   (female) w/bimanual: normal female external genitalia, normal urethral meatus, normal vaginal mucosa, and normal cervix/adnexa/uterus without masses or discharge. IUD strings present  MS: no gross musculoskeletal defects noted, no edema  SKIN: no suspicious lesions or rashes  NEURO: Normal strength and tone, mentation intact and speech normal  PSYCH: mentation appears normal, affect normal/bright        Signed Electronically by: Kristen M. Kehr, PA-C    "

## 2024-11-12 NOTE — PROGRESS NOTES
"Outpatient Nutrition Oncology Follow Up    Patient Name: Josue Stern  YOB: 1955  MRN: [id number]    Recommendation(s):   Small, frequent high kcal, high protein meals.  High kcal, high protein ONS 2-3 X day.  Adequate fluids.  Monitor for steatorrhea.      Reason for Consult:  Identified as high risk by screening criteria       Today's Weight:  68 kg    Weight Change:  3.9% loss in 1 month; 6.8% loss in 3 months (considered mild)    Nutrition-related concerns: Early Satiety (Infusion nurse mentioned pt is having loose stools, but pt denies)    Current PO intake:  Regular foods, trying to consume adequate proteins     Current Nutrition Supplement intake:  Ensure (a variety)    Consult or Intervention:  Pt seen due to weight decline.  Reviewed symptoms of steatorrhea and pt is not sure if he is having this issue due to reports he \"needs to pay more attention to BM's\".  Encouraged pt to do so.  He reports he feels like he is eating well and eating good protein sources, but may need to increase his consumption.  He is drinking regular Ensure and perhaps the high protein Ensure- not \"Plus\" or \"Complete\".  Provided samples of Ensure Complete and Boost Plus.  Provided handout on symptoms of pancreatic enzymes and high kcals / high protein easy to consume snacks.     Nutrition Diagnosis: Unintentional weight loss related to decreased ability to consume sufficient energy as evidenced by physiological causes increasing nutrient needs., hypermetabolic state., decreased appetite., and patient report.    Plan: Will follow up per oncology nutrition protocol             "

## 2024-11-12 NOTE — PROGRESS NOTES
Chief Complaint  No chief complaint on file.    Ad, MD Ad Azevedo, Forrest Leon MD    Subjective          Josue Stern presents to [facility] for ongoing treatment of his pancreatic cancer.  He is on modified FOLFIRINOX.  His last cycle was delayed due to neutropenia.  He denies fever chills or focal signs/symptoms of infection.  His blood counts did improve with additional time.  He has some fatigue but still able to perform all of his ADLs including working full-time.  He notes mild neuropathy in his feet but not causing issues with his activities.  Motrin does help that.    Oncology/Hematology History   Malignant neoplasm of head of pancreas   9/5/2024 Initial Diagnosis    Malignant neoplasm of pancreas     9/5/2024 -  Chemotherapy    OP CENTRAL VENOUS ACCESS DEVICE Access, Care, and Maintenance (CVAD)     9/17/2024 -  Chemotherapy    OP PANCREATIC mFOLFIRINOX (OXALIplatin / Leucovorin / Irinotecan / Fluorouracil CIV)           Current Outpatient Medications on File Prior to Visit   Medication Sig Dispense Refill    albuterol sulfate  (90 Base) MCG/ACT inhaler Inhale 2 puffs Every 4 (Four) Hours As Needed for Wheezing. 18 g 0    amLODIPine (Norvasc) 5 MG tablet Take 1 tablet by mouth Daily.      amoxicillin (AMOXIL) 500 MG capsule Take 1 capsule by mouth 3 times a day.      azithromycin (Zithromax Z-Tomasz) 250 MG tablet Take 2 tablets by mouth on day 1, then 1 tablet daily on days 2-5 6 tablet 0    buprenorphine-naloxone (Suboxone) 8-2 MG film film Place 1 film under the tongue 2 (Two) Times a Day.      busPIRone (BUSPAR) 15 MG tablet Take 1 tablet by mouth 3 (Three) Times a Day.      cetirizine (ZyrTEC Allergy) 10 MG tablet Take 1 tablet by mouth Daily.      DULoxetine (CYMBALTA) 30 MG capsule Take 1 capsule by mouth Daily. 30 capsule 1    gabapentin (NEURONTIN) 800 MG tablet Take 1 tablet by mouth 3 (Three) Times a Day.      lidocaine-prilocaine  (EMLA) 2.5-2.5 % cream Apply 1 Application topically to the appropriate area as directed As Needed for Injection Site Pain. 30 g 1    ondansetron (ZOFRAN) 8 MG tablet Take 1 tablet by mouth 3 (Three) Times a Day As Needed for Nausea or Vomiting. 30 tablet 5    prazosin (MINIPRESS) 1 MG capsule Take 1 capsule by mouth Every Night.      predniSONE (DELTASONE) 50 MG tablet Take 1 tablet by mouth Daily. 5 tablet 0    propranolol LA (Inderal LA) 60 MG 24 hr capsule Take 1 capsule by mouth Daily.      [DISCONTINUED] ibuprofen (ADVIL,MOTRIN) 800 MG tablet Take 1 tablet by mouth Every 8 (Eight) Hours As Needed for Mild Pain. 30 tablet 0     Current Facility-Administered Medications on File Prior to Visit   Medication Dose Route Frequency Provider Last Rate Last Admin    atropine sulfate injection 0.25 mg  0.25 mg Intravenous Q15 Min PRN Gene Blanchard MD   0.25 mg at 11/12/24 1234    [COMPLETED] dexAMETHasone (DECADRON) injection 12 mg  12 mg Intravenous Once Gene Blanchard MD   12 mg at 11/12/24 0942    [COMPLETED] dextrose (D5W) 5 % infusion  20 mL/hr Intravenous Once Gene Blanchard MD 20 mL/hr at 11/12/24 0937 20 mL/hr at 11/12/24 0937    diphenhydrAMINE (BENADRYL) injection 50 mg  50 mg Intravenous PRN Gene Blanchard MD        famotidine (PEPCID) injection 20 mg  20 mg Intravenous PRN Gene Blanchard MD        fluorouracil (ADRUCIL) 3,900 mg in sodium chloride 0.9 % 138 mL chemo infusion - FOR HOME USE  3,900 mg Intravenous Once Gene Blanchard MD        Hydrocortisone Sod Suc (PF) (Solu-CORTEF) injection 100 mg  100 mg Intravenous PRN Gene Blanchard MD        irinotecan (CAMPTOSAR) 205 mg in dextrose (D5W) 5 % 560.3 mL chemo IVPB  112.5 mg/m2 (Treatment Plan Recorded) Intravenous Once Gene Blanchard MD   205 mg at 11/12/24 1238    leucovorin 660 mg in dextrose (D5W) 5 % 308 mL IVPB  360 mg/m2 (Treatment Plan Recorded) Intravenous Once Gene Blanchard MD   660 mg at 11/12/24 1205    [COMPLETED]  OXALIplatin (ELOXATIN) 140 mg in dextrose (D5W) 5 % 303 mL chemo IVPB  76.5 mg/m2 (Treatment Plan Recorded) Intravenous Once Gene Blanchard MD   Stopped at 11/12/24 1203    [COMPLETED] palonosetron (ALOXI) injection 0.25 mg  0.25 mg Intravenous Once Gene Blanchard MD   0.25 mg at 11/12/24 0945    [DISCONTINUED] dexAMETHasone (DECADRON) IVPB 12 mg  12 mg Intravenous Once Gene Blanchard MD           Allergies   Allergen Reactions    Aspirin Nausea Only     Past Medical History:   Diagnosis Date    Allergies     Asthma     GERD (gastroesophageal reflux disease)     Hypertension     Malignant neoplasm of pancreas 09/05/2024     Past Surgical History:   Procedure Laterality Date    HERNIA REPAIR       Social History     Socioeconomic History    Marital status: Single   Tobacco Use    Smoking status: Every Day     Current packs/day: 0.50     Types: Cigarettes    Smokeless tobacco: Never   Vaping Use    Vaping status: Every Day   Substance and Sexual Activity    Alcohol use: Never    Drug use: Never    Sexual activity: Defer     History reviewed. No pertinent family history.    Objective   Physical Exam  Vitals reviewed. Exam conducted with a chaperone present.   Cardiovascular:      Rate and Rhythm: Normal rate and regular rhythm.      Heart sounds: Normal heart sounds. No murmur heard.     No gallop.   Pulmonary:      Effort: Pulmonary effort is normal.      Breath sounds: Normal breath sounds.      Comments: port  Abdominal:      General: Bowel sounds are normal.   Musculoskeletal:      Right lower leg: No edema.      Left lower leg: No edema.   Lymphadenopathy:      Cervical: No cervical adenopathy.   Psychiatric:         Mood and Affect: Mood normal.         Behavior: Behavior normal.         Vitals:    11/12/24 0848   BP: 147/94   Pulse: 56   Resp: 18   Temp: 97.1 °F (36.2 °C)   TempSrc: Temporal   SpO2: 100%   Weight: 68 kg (150 lb)   PainSc: 0-No pain     ECOG score: 0         PHQ-9 Total Score:                     Result Review :   The following data was reviewed by: Gene Blanchard MD on 11/12/2024:  Lab Results   Component Value Date    HGB 11.6 (L) 11/12/2024    HCT 36.3 (L) 11/12/2024    MCV 87.1 11/12/2024     11/12/2024    WBC 5.33 11/12/2024    NEUTROABS 2.06 11/12/2024    LYMPHSABS 2.30 11/12/2024    MONOSABS 0.86 11/12/2024    EOSABS 0.06 11/12/2024    BASOSABS 0.03 11/12/2024     Lab Results   Component Value Date    GLUCOSE 104 (H) 11/12/2024    BUN 11 11/12/2024    CREATININE 1.02 11/12/2024     11/12/2024    K 4.5 11/12/2024     11/12/2024    CO2 24.4 11/12/2024    CALCIUM 8.6 11/12/2024    PROTEINTOT 6.5 11/12/2024    ALBUMIN 3.9 11/12/2024    BILITOT 0.4 11/12/2024    ALKPHOS 193 (H) 11/12/2024    AST 50 (H) 11/12/2024    ALT 34 11/12/2024     Lab Results   Component Value Date    MG 1.7 12/10/2021    PHOS 3.4 12/10/2021    FREET4 1.74 06/30/2020    TSH 0.256 (L) 12/06/2021     Lab Results   Component Value Date    IRON 15 (L) 12/08/2021    LABIRON 7 (L) 12/08/2021    TRANSFERRIN 150 (L) 12/08/2021    TIBC 224 (L) 12/08/2021     Lab Results   Component Value Date    FERRITIN 75 12/21/2020    [id number] 1,022 (H) 12/08/2021    FOLATE 10.90 12/08/2021     Lab Results   Component Value Date     1,386.0 (H) 09/05/2024             Assessment and Plan    Diagnoses and all orders for this visit:    1. Malignant neoplasm of head of pancreas (Primary)  Assessment & Plan:  Patient is on treatment with FOLFIRINOX.  His last cycle was complicated by neutropenia but blood counts are better today.  Proceed with cycle 4 as planned.  He will receive growth factor support with pegfilgrastim with each cycle moving forward.  I will see him back for cycle 5-day 1 with lab work prior to monitor for toxicities and restaging scans to assess response to therapy.    Orders:  -     CBC and Differential; Future  -     Comprehensive metabolic panel; Future  -     CT chest w contrast; Future  -     CT  abdomen pelvis w contrast; Future  -     Infusion Appointment Request 10; Future  -     Cancer Antigen 19-9; Future    2. Neuropathy associated with cancer  Assessment & Plan:  Continue duloxetine.  He is already on gabapentin through other physicians.  Refill Motrin as needed.    Orders:  -     ibuprofen (ADVIL,MOTRIN) 800 MG tablet; Take 1 tablet by mouth Every 8 (Eight) Hours As Needed for Mild Pain.  Dispense: 30 tablet; Refill: 1    Other orders  -     Cancel: dextrose (D5W) 5 % infusion  -     Cancel: palonosetron (ALOXI) injection 0.25 mg  -     Cancel: dexAMETHasone (DECADRON) 12 mg in sodium chloride 0.9 % IVPB  -     Cancel: OXALIplatin (ELOXATIN) 140 mg in dextrose (D5W) 5 % 278 mL chemo IVPB  -     Cancel: leucovorin 660 mg in dextrose (D5W) 5 % 316 mL IVPB  -     Cancel: irinotecan (CAMPTOSAR) 205 mg in dextrose (D5W) 5 % 510.3 mL chemo IVPB  -     Cancel: atropine injection 0.25 mg  -     Cancel: fluorouracil (ADRUCIL) 3,930 mg in sodium chloride 0.9 % 78.6 mL chemo infusion - FOR HOME USE  -     Cancel: Hydrocortisone Sod Suc (PF) (Solu-CORTEF) injection 100 mg  -     Cancel: diphenhydrAMINE (BENADRYL) injection 50 mg  -     Cancel: famotidine (PEPCID) injection 20 mg  -     pegfilgrastim (NEULASTA ONPRO) on-body injector 6 mg            Patient Follow Up: Cycle 5-day 1  Hello all understanding disease have asked trying to find your disease doing colonoscopies with a colonoscopy phone times  Patient was given instructions and counseling regarding his condition or for health maintenance advice. Please see specific information pulled into the AVS if appropriate.     Gene Blanchard MD    11/12/2024

## 2024-11-12 NOTE — ASSESSMENT & PLAN NOTE
Patient is on treatment with FOLFIRINOX.  His last cycle was complicated by neutropenia but blood counts are better today.  Proceed with cycle 4 as planned.  He will receive growth factor support with pegfilgrastim with each cycle moving forward.  I will see him back for cycle 5-day 1 with lab work prior to monitor for toxicities and restaging scans to assess response to therapy.

## 2024-11-12 NOTE — PROGRESS NOTES
Diagnosis: Pancreatic cancer    Reason for Referral: Financial assistance     Content of Visit: OSW received a notification from the medical oncology infusion nurse, Bonnie MACE, that Mr. Stern is requesting to speak with an OSW today. OSW met with Mr. Stern in the medical oncology infusion center this morning. He presented in a good mood with matching affect. He's continued to work throughout his treatment, however, not his normal full time hours. He inquired how much additional assistance he may receive through [employer]. Advised that typically they have to cap at $1,000, however, OSW will confirm with [address]. OSW also reviewed additional resources through [employer], [employer] ([employer]PC[employer]), and [employer]. OSW helped facilitate a call to [employer] today to initiate Mr. Stern's application. [employer] emailed OSW Mr. Stern's application to complete. Mr. Stern will work on getting a copy of his tax return to finalize his application for [employer] and [employer]. Unfortunately, for the [employer], he has to provide a copy of his birth certificate, in which he does not have. OSW consulted with [employer] and they will not accept alternative documentation. Will provide Mr. Stern with instructions on how to obtain a copy of her birth certification if he wishes to proceed with this request and application. OSW support remains available.    Update 11/13/24: OSW received a response from [employer] [address] that they can provide additional assistance to Mr. Stern. If he's requesting rental assistance, they will need a W9 from his landlord. OSW attempted to reach Mr. Stern this afternoon without success. Will try to follow up with him tomorrow during his unhook appt.    Resources/Referrals Provided: Financial resources through [employer], [employer] (NPCF), and [employer]

## 2024-11-14 ENCOUNTER — TELEPHONE (OUTPATIENT)
Dept: ONCOLOGY | Facility: HOSPITAL | Age: 69
End: 2024-11-14
Payer: MEDICARE

## 2024-11-14 ENCOUNTER — HOSPITAL ENCOUNTER (OUTPATIENT)
Dept: ONCOLOGY | Facility: HOSPITAL | Age: 69
Discharge: HOME | End: 2024-11-14
Payer: MEDICARE

## 2024-11-14 ENCOUNTER — DOCUMENTATION (OUTPATIENT)
Dept: ONCOLOGY | Facility: HOSPITAL | Age: 69
End: 2024-11-14
Payer: MEDICARE

## 2024-11-14 VITALS
HEART RATE: 53 BPM | TEMPERATURE: 97.9 F | SYSTOLIC BLOOD PRESSURE: 126 MMHG | RESPIRATION RATE: 16 BRPM | OXYGEN SATURATION: 100 % | DIASTOLIC BLOOD PRESSURE: 88 MMHG

## 2024-11-14 DIAGNOSIS — C25.0 MALIGNANT NEOPLASM OF HEAD OF PANCREAS: Primary | ICD-10-CM

## 2024-11-14 PROCEDURE — 25010000002 PEGFILGRASTIM 6 MG/0.6ML PREFILLED SYRINGE KIT: Performed by: INTERNAL MEDICINE

## 2024-11-14 PROCEDURE — 96377 APPLICATON ON-BODY INJECTOR: CPT

## 2024-11-14 PROCEDURE — 25010000002 HEPARIN LOCK FLUSH PER 10 UNITS: Performed by: INTERNAL MEDICINE

## 2024-11-14 RX ORDER — HEPARIN SODIUM (PORCINE) LOCK FLUSH IV SOLN 100 UNIT/ML 100 UNIT/ML
500 SOLUTION INTRAVENOUS AS NEEDED
Status: DISCONTINUED | OUTPATIENT
Start: 2024-11-14 | End: 2024-11-15 | Stop reason: HOSPADM

## 2024-11-14 RX ORDER — SODIUM CHLORIDE 0.9 % (FLUSH) 0.9 %
20 SYRINGE (ML) INJECTION AS NEEDED
OUTPATIENT
Start: 2024-11-14

## 2024-11-14 RX ORDER — SODIUM CHLORIDE 0.9 % (FLUSH) 0.9 %
20 SYRINGE (ML) INJECTION AS NEEDED
Status: DISCONTINUED | OUTPATIENT
Start: 2024-11-14 | End: 2024-11-15 | Stop reason: HOSPADM

## 2024-11-14 RX ORDER — HEPARIN SODIUM (PORCINE) LOCK FLUSH IV SOLN 100 UNIT/ML 100 UNIT/ML
500 SOLUTION INTRAVENOUS AS NEEDED
OUTPATIENT
Start: 2024-11-14

## 2024-11-14 RX ADMIN — PEGFILGRASTIM 6 MG: KIT SUBCUTANEOUS at 13:28

## 2024-11-14 RX ADMIN — HEPARIN 500 UNITS: 100 SYRINGE at 13:26

## 2024-11-14 RX ADMIN — Medication 20 ML: at 13:26

## 2024-11-14 NOTE — PROGRESS NOTES
Diagnosis: Pancreatic cancer    Reason for Referral: Financial assistance    Content of Visit: OSW met with Mr. Stern in medical oncology [facility] center this afternoon. Advised that [employer] can provide additional assistance, but will need a W9 from his landlord if he wants additional rent assistance. He v/u and is agreeable to OSW contacting his landlord on his behalf. Additionally, OSW reminded Mr. Stern to obtain a copy of his tax return in order for OSW to complete his applications through [employer] and [employer]. He v/u. Lastly, advised Mr. Stern that the [employer] is requiring his birth certificate and not allowing any alternative documentation. Offered to provide him with instructions on how to obtain a copy of his birth certificate, however, he respectfully declined to proceed with this at this time. Encouraged OSW support remains available. He expressed appreciation.     OSW left his  a  this afternoon requesting a W9. Provided her with my phone number, fax number and email.     Update 11/18/24: OSW received the W9 from Mr. Stern's landlord and submitted to [employer] [address] this morning to request assistance assistance with his rent. OSW support remains available.    Resources/Referrals Provided: [employer] for rental assistance

## 2024-11-14 NOTE — TELEPHONE ENCOUNTER
OSW received a phone call from Mr. Stern's sister inquiring if Mr. Stern made it to his unhook appointment this afternoon, reporting she's been trying to reach him without success and is worried about him. She explains she spoke with him Tuesday and normally checks in with him after his chemotherapy treatment and it's not like him to not call her back. Alena is listed on Mr. Stern's PHI form. Confirmed that Mr. Stern did attend his appointment this afternoon and looked well when OSW spoke with him. She expressed relief and appreciation for OSW returning her phone call. Advised that OSW had tried reaching him by telephone yesterday as well and was having trouble dialing through to him. His sister suggested he may have accidentally put his cell phone on airplane mode without realizing it and will plan to contact him at work this evening to make him aware. OSW support remains available.

## 2024-11-29 DIAGNOSIS — C80.1 NEUROPATHY ASSOCIATED WITH CANCER: ICD-10-CM

## 2024-11-29 DIAGNOSIS — G63 NEUROPATHY ASSOCIATED WITH CANCER: ICD-10-CM

## 2024-12-02 RX ORDER — DULOXETIN HYDROCHLORIDE 30 MG/1
30 CAPSULE, DELAYED RELEASE ORAL DAILY
Qty: 30 CAPSULE | Refills: 1 | Status: SHIPPED | OUTPATIENT
Start: 2024-12-02

## 2024-12-03 ENCOUNTER — DOCUMENTATION (OUTPATIENT)
Dept: ONCOLOGY | Facility: HOSPITAL | Age: 69
End: 2024-12-03
Payer: MEDICARE

## 2024-12-03 ENCOUNTER — DOCUMENTATION (OUTPATIENT)
Dept: NUTRITION | Facility: HOSPITAL | Age: 69
End: 2024-12-03
Payer: MEDICARE

## 2024-12-03 ENCOUNTER — HOSPITAL ENCOUNTER (OUTPATIENT)
Dept: ONCOLOGY | Facility: HOSPITAL | Age: 69
Discharge: HOME | End: 2024-12-03
Payer: MEDICARE

## 2024-12-03 ENCOUNTER — OFFICE VISIT (OUTPATIENT)
Dept: ONCOLOGY | Facility: HOSPITAL | Age: 69
End: 2024-12-03
Payer: MEDICARE

## 2024-12-03 VITALS
WEIGHT: 151.68 LBS | HEART RATE: 64 BPM | OXYGEN SATURATION: 100 % | RESPIRATION RATE: 16 BRPM | SYSTOLIC BLOOD PRESSURE: 105 MMHG | BODY MASS INDEX: 24.48 KG/M2 | DIASTOLIC BLOOD PRESSURE: 62 MMHG | TEMPERATURE: 98.1 F

## 2024-12-03 VITALS
BODY MASS INDEX: 24.37 KG/M2 | TEMPERATURE: 98.1 F | RESPIRATION RATE: 20 BRPM | WEIGHT: 151 LBS | SYSTOLIC BLOOD PRESSURE: 105 MMHG | OXYGEN SATURATION: 99 % | HEART RATE: 64 BPM | DIASTOLIC BLOOD PRESSURE: 62 MMHG

## 2024-12-03 DIAGNOSIS — C25.0 MALIGNANT NEOPLASM OF HEAD OF PANCREAS: Primary | ICD-10-CM

## 2024-12-03 DIAGNOSIS — C80.1 NEUROPATHY ASSOCIATED WITH CANCER: ICD-10-CM

## 2024-12-03 DIAGNOSIS — G63 NEUROPATHY ASSOCIATED WITH CANCER: ICD-10-CM

## 2024-12-03 LAB
ALBUMIN SERPL-MCNC: 3.5 G/DL (ref 3.5–5.2)
ALBUMIN/GLOB SERPL: 1.3 G/DL
ALP SERPL-CCNC: 246 U/L (ref 39–117)
ALT SERPL W P-5'-P-CCNC: 34 U/L (ref 1–41)
ANION GAP SERPL CALCULATED.3IONS-SCNC: 9 MMOL/L (ref 5–15)
AST SERPL-CCNC: 54 U/L (ref 1–40)
BASOPHILS # BLD AUTO: 0.07 10*3/MM3 (ref 0–0.2)
BASOPHILS NFR BLD AUTO: 1.2 % (ref 0–1.5)
BILIRUB SERPL-MCNC: 0.3 MG/DL (ref 0–1.2)
BUN SERPL-MCNC: 9 MG/DL (ref 8–23)
BUN/CREAT SERPL: 11.1 (ref 7–25)
CALCIUM SPEC-SCNC: 8.4 MG/DL (ref 8.6–10.5)
CANCER AG19-9 SERPL-ACNC: 242 U/ML
CHLORIDE SERPL-SCNC: 103 MMOL/L (ref 98–107)
CO2 SERPL-SCNC: 25 MMOL/L (ref 22–29)
CREAT SERPL-MCNC: 0.81 MG/DL (ref 0.76–1.27)
DEPRECATED RDW RBC AUTO: 47.5 FL (ref 37–54)
EGFRCR SERPLBLD CKD-EPI 2021: 94.8 ML/MIN/1.73
EOSINOPHIL # BLD AUTO: 0.06 10*3/MM3 (ref 0–0.4)
EOSINOPHIL NFR BLD AUTO: 1 % (ref 0.3–6.2)
ERYTHROCYTE [DISTWIDTH] IN BLOOD BY AUTOMATED COUNT: 15.4 % (ref 12.3–15.4)
GLOBULIN UR ELPH-MCNC: 2.8 GM/DL
GLUCOSE SERPL-MCNC: 161 MG/DL (ref 65–99)
HCT VFR BLD AUTO: 34.4 % (ref 37.5–51)
HGB BLD-MCNC: 11 G/DL (ref 13–17.7)
IMM GRANULOCYTES # BLD AUTO: 0.03 10*3/MM3 (ref 0–0.05)
IMM GRANULOCYTES NFR BLD AUTO: 0.5 % (ref 0–0.5)
LYMPHOCYTES # BLD AUTO: 2 10*3/MM3 (ref 0.7–3.1)
LYMPHOCYTES NFR BLD AUTO: 34.5 % (ref 19.6–45.3)
MCH RBC QN AUTO: 27.4 PG (ref 26.6–33)
MCHC RBC AUTO-ENTMCNC: 32 G/DL (ref 31.5–35.7)
MCV RBC AUTO: 85.6 FL (ref 79–97)
MONOCYTES # BLD AUTO: 0.86 10*3/MM3 (ref 0.1–0.9)
MONOCYTES NFR BLD AUTO: 14.9 % (ref 5–12)
NEUTROPHILS NFR BLD AUTO: 2.77 10*3/MM3 (ref 1.7–7)
NEUTROPHILS NFR BLD AUTO: 47.9 % (ref 42.7–76)
NRBC BLD AUTO-RTO: 0 /100 WBC (ref 0–0.2)
PLATELET # BLD AUTO: 294 10*3/MM3 (ref 140–450)
PMV BLD AUTO: 11 FL (ref 6–12)
POTASSIUM SERPL-SCNC: 4 MMOL/L (ref 3.5–5.2)
PROT SERPL-MCNC: 6.3 G/DL (ref 6–8.5)
RBC # BLD AUTO: 4.02 10*6/MM3 (ref 4.14–5.8)
SODIUM SERPL-SCNC: 137 MMOL/L (ref 136–145)
WBC NRBC COR # BLD AUTO: 5.79 10*3/MM3 (ref 3.4–10.8)

## 2024-12-03 PROCEDURE — 25010000002 LEUCOVORIN CALCIUM PER 50 MG: Performed by: INTERNAL MEDICINE

## 2024-12-03 PROCEDURE — 96415 CHEMO IV INFUSION ADDL HR: CPT

## 2024-12-03 PROCEDURE — 80053 COMPREHEN METABOLIC PANEL: CPT | Performed by: INTERNAL MEDICINE

## 2024-12-03 PROCEDURE — G0498 CHEMO EXTEND IV INFUS W/PUMP: HCPCS

## 2024-12-03 PROCEDURE — 96417 CHEMO IV INFUS EACH ADDL SEQ: CPT

## 2024-12-03 PROCEDURE — 25010000002 FLUOROURACIL PER 500 MG: Performed by: INTERNAL MEDICINE

## 2024-12-03 PROCEDURE — 96368 THER/DIAG CONCURRENT INF: CPT

## 2024-12-03 PROCEDURE — 25010000002 OXALIPLATIN PER 0.5 MG: Performed by: INTERNAL MEDICINE

## 2024-12-03 PROCEDURE — 86301 IMMUNOASSAY TUMOR CA 19-9: CPT | Performed by: INTERNAL MEDICINE

## 2024-12-03 PROCEDURE — 25010000003 DEXTROSE 5 % SOLUTION 500 ML FLEX CONT: Performed by: INTERNAL MEDICINE

## 2024-12-03 PROCEDURE — 96413 CHEMO IV INFUSION 1 HR: CPT

## 2024-12-03 PROCEDURE — 96375 TX/PRO/DX INJ NEW DRUG ADDON: CPT

## 2024-12-03 PROCEDURE — 25010000002 PALONOSETRON PER 25 MCG: Performed by: INTERNAL MEDICINE

## 2024-12-03 PROCEDURE — 25010000002 IRINOTECAN PER 20 MG: Performed by: INTERNAL MEDICINE

## 2024-12-03 PROCEDURE — 25010000003 DEXTROSE 5 % SOLUTION 250 ML FLEX CONT: Performed by: INTERNAL MEDICINE

## 2024-12-03 PROCEDURE — 25010000003 DEXTROSE 5 % SOLUTION: Performed by: INTERNAL MEDICINE

## 2024-12-03 PROCEDURE — 25010000002 DEXAMETHASONE SODIUM PHOSPHATE 120 MG/30ML SOLUTION: Performed by: INTERNAL MEDICINE

## 2024-12-03 PROCEDURE — 85025 COMPLETE CBC W/AUTO DIFF WBC: CPT | Performed by: INTERNAL MEDICINE

## 2024-12-03 RX ORDER — DIPHENHYDRAMINE HYDROCHLORIDE 50 MG/ML
50 INJECTION INTRAMUSCULAR; INTRAVENOUS AS NEEDED
Status: CANCELLED | OUTPATIENT
Start: 2024-12-03

## 2024-12-03 RX ORDER — DEXTROSE MONOHYDRATE 50 MG/ML
20 INJECTION, SOLUTION INTRAVENOUS ONCE
Status: CANCELLED | OUTPATIENT
Start: 2024-12-03

## 2024-12-03 RX ORDER — PALONOSETRON 0.05 MG/ML
0.25 INJECTION, SOLUTION INTRAVENOUS ONCE
Status: COMPLETED | OUTPATIENT
Start: 2024-12-03 | End: 2024-12-03

## 2024-12-03 RX ORDER — DIPHENHYDRAMINE HYDROCHLORIDE 50 MG/ML
50 INJECTION INTRAMUSCULAR; INTRAVENOUS AS NEEDED
Status: DISCONTINUED | OUTPATIENT
Start: 2024-12-03 | End: 2024-12-04 | Stop reason: HOSPADM

## 2024-12-03 RX ORDER — FAMOTIDINE 10 MG/ML
20 INJECTION, SOLUTION INTRAVENOUS AS NEEDED
Status: CANCELLED | OUTPATIENT
Start: 2024-12-03

## 2024-12-03 RX ORDER — HYDROCORTISONE SODIUM SUCCINATE 100 MG/2ML
100 INJECTION INTRAMUSCULAR; INTRAVENOUS AS NEEDED
Status: DISCONTINUED | OUTPATIENT
Start: 2024-12-03 | End: 2024-12-04 | Stop reason: HOSPADM

## 2024-12-03 RX ORDER — DEXTROSE MONOHYDRATE 50 MG/ML
20 INJECTION, SOLUTION INTRAVENOUS ONCE
Status: COMPLETED | OUTPATIENT
Start: 2024-12-03 | End: 2024-12-03

## 2024-12-03 RX ORDER — HYDROCORTISONE SODIUM SUCCINATE 100 MG/2ML
100 INJECTION INTRAMUSCULAR; INTRAVENOUS AS NEEDED
Status: CANCELLED | OUTPATIENT
Start: 2024-12-03

## 2024-12-03 RX ORDER — ATROPINE SULFATE 1 MG/ML
0.25 INJECTION, SOLUTION INTRAMUSCULAR; INTRAVENOUS; SUBCUTANEOUS
Status: CANCELLED | OUTPATIENT
Start: 2024-12-03

## 2024-12-03 RX ORDER — IBUPROFEN 800 MG/1
800 TABLET, FILM COATED ORAL EVERY 8 HOURS PRN
Qty: 30 TABLET | Refills: 1 | Status: SHIPPED | OUTPATIENT
Start: 2024-12-03

## 2024-12-03 RX ORDER — FAMOTIDINE 10 MG/ML
20 INJECTION, SOLUTION INTRAVENOUS AS NEEDED
Status: DISCONTINUED | OUTPATIENT
Start: 2024-12-03 | End: 2024-12-04 | Stop reason: HOSPADM

## 2024-12-03 RX ORDER — PALONOSETRON 0.05 MG/ML
0.25 INJECTION, SOLUTION INTRAVENOUS ONCE
Status: CANCELLED | OUTPATIENT
Start: 2024-12-03

## 2024-12-03 RX ADMIN — DEXAMETHASONE SODIUM PHOSPHATE 12 MG: 4 INJECTION, SOLUTION INTRA-ARTICULAR; INTRALESIONAL; INTRAMUSCULAR; INTRAVENOUS; SOFT TISSUE at 09:57

## 2024-12-03 RX ADMIN — DEXTROSE MONOHYDRATE 20 ML/HR: 50 INJECTION, SOLUTION INTRAVENOUS at 09:55

## 2024-12-03 RX ADMIN — IRINOTECAN HYDROCHLORIDE 205 MG: 20 INJECTION, SOLUTION INTRAVENOUS at 12:34

## 2024-12-03 RX ADMIN — OXALIPLATIN 140 MG: 5 INJECTION, SOLUTION INTRAVENOUS at 10:30

## 2024-12-03 RX ADMIN — LEUCOVORIN CALCIUM 660 MG: 350 INJECTION, POWDER, LYOPHILIZED, FOR SOLUTION INTRAMUSCULAR; INTRAVENOUS at 12:34

## 2024-12-03 RX ADMIN — ATROPINE SULFATE 0.25 MG: 0.4 INJECTION, SOLUTION INTRAVENOUS at 12:33

## 2024-12-03 RX ADMIN — PALONOSETRON HYDROCHLORIDE 0.25 MG: 0.25 INJECTION INTRAVENOUS at 10:14

## 2024-12-03 RX ADMIN — FLUOROURACIL 3900 MG: 50 INJECTION, SOLUTION INTRAVENOUS at 14:43

## 2024-12-03 NOTE — ASSESSMENT & PLAN NOTE
Patient is on gabapentin, Cymbalta and ibuprofen as needed.  I will refill ibuprofen today.  Reassess next visit.

## 2024-12-03 NOTE — PROGRESS NOTES
Chief Complaint  No chief complaint on file.    Ad, MD Ad Azevedo, Forrest Leon MD    Subjective          Josue Brandis Jarred presents to [facility] for patient is on neoadjuvant therapy with FOLFIRINOX.  He is due for cycle 5.  He notes increased fatigue but he is able to perform his ADLs.  He denies new masses or adenopathy.  He notes ongoing neuropathy but is taking Cymbalta, gabapentin and Motrin as needed.  He feels these help.  No issues from his Port-A-Cath.    Oncology/Hematology History   Malignant neoplasm of head of pancreas   9/5/2024 Initial Diagnosis    Malignant neoplasm of pancreas     9/5/2024 -  Chemotherapy    OP CENTRAL VENOUS ACCESS DEVICE Access, Care, and Maintenance (CVAD)     9/17/2024 -  Chemotherapy    OP PANCREATIC mFOLFIRINOX (OXALIplatin / Leucovorin / Irinotecan / Fluorouracil CIV)           Current Outpatient Medications on File Prior to Visit   Medication Sig Dispense Refill    albuterol sulfate  (90 Base) MCG/ACT inhaler Inhale 2 puffs Every 4 (Four) Hours As Needed for Wheezing. 18 g 0    amLODIPine (Norvasc) 5 MG tablet Take 1 tablet by mouth Daily.      amoxicillin (AMOXIL) 500 MG capsule Take 1 capsule by mouth 3 times a day.      azithromycin (Zithromax Z-Tomasz) 250 MG tablet Take 2 tablets by mouth on day 1, then 1 tablet daily on days 2-5 6 tablet 0    buprenorphine-naloxone (Suboxone) 8-2 MG film film Place 1 film under the tongue 2 (Two) Times a Day.      busPIRone (BUSPAR) 15 MG tablet Take 1 tablet by mouth 3 (Three) Times a Day.      cetirizine (ZyrTEC Allergy) 10 MG tablet Take 1 tablet by mouth Daily.      DULoxetine (CYMBALTA) 30 MG capsule TAKE 1 CAPSULE BY MOUTH DAILY 30 capsule 1    gabapentin (NEURONTIN) 800 MG tablet Take 1 tablet by mouth 3 (Three) Times a Day.      lidocaine-prilocaine (EMLA) 2.5-2.5 % cream Apply 1 Application topically to the appropriate area as directed As Needed for Injection Site  Pain. 30 g 1    ondansetron (ZOFRAN) 8 MG tablet Take 1 tablet by mouth 3 (Three) Times a Day As Needed for Nausea or Vomiting. 30 tablet 5    prazosin (MINIPRESS) 1 MG capsule Take 1 capsule by mouth Every Night.      predniSONE (DELTASONE) 50 MG tablet Take 1 tablet by mouth Daily. 5 tablet 0    propranolol LA (Inderal LA) 60 MG 24 hr capsule Take 1 capsule by mouth Daily.      [DISCONTINUED] ibuprofen (ADVIL,MOTRIN) 800 MG tablet Take 1 tablet by mouth Every 8 (Eight) Hours As Needed for Mild Pain. 30 tablet 1     Current Facility-Administered Medications on File Prior to Visit   Medication Dose Route Frequency Provider Last Rate Last Admin    atropine sulfate injection 0.25 mg  0.25 mg Intravenous Q15 Min PRN Gene Blanchard MD        [COMPLETED] dexAMETHasone (DECADRON) IVPB 12 mg  12 mg Intravenous Once Gene Blanchard MD   Stopped at 12/03/24 1012    [COMPLETED] dextrose (D5W) 5 % infusion  20 mL/hr Intravenous Once Gene Blanchard MD 20 mL/hr at 12/03/24 0955 20 mL/hr at 12/03/24 0955    diphenhydrAMINE (BENADRYL) injection 50 mg  50 mg Intravenous PRN Gene Blanchard MD        famotidine (PEPCID) injection 20 mg  20 mg Intravenous PRN Gene Blanchard MD        fluorouracil (ADRUCIL) 3,900 mg in sodium chloride 0.9 % 138 mL chemo infusion - FOR HOME USE  3,900 mg Intravenous Once Gene Blanchard MD        Hydrocortisone Sod Suc (PF) (Solu-CORTEF) injection 100 mg  100 mg Intravenous PRN Gene Blanchard MD        irinotecan (CAMPTOSAR) 205 mg in dextrose (D5W) 5 % 560.3 mL chemo IVPB  112.5 mg/m2 (Treatment Plan Recorded) Intravenous Once Gene Blanchard MD        leucovorin 660 mg in dextrose (D5W) 5 % 308 mL IVPB  360 mg/m2 (Treatment Plan Recorded) Intravenous Once Gene Blanchard MD        [COMPLETED] OXALIplatin (ELOXATIN) 140 mg in dextrose (D5W) 5 % 303 mL chemo IVPB  76.5 mg/m2 (Treatment Plan Recorded) Intravenous Once Gene Blanchard MD   Stopped at 12/03/24 1226    [COMPLETED]  palonosetron (ALOXI) injection 0.25 mg  0.25 mg Intravenous Once Gene Blanchard MD   0.25 mg at 12/03/24 1014       Allergies   Allergen Reactions    Aspirin Nausea Only     Past Medical History:   Diagnosis Date    Allergies     Asthma     GERD (gastroesophageal reflux disease)     Hypertension     Malignant neoplasm of pancreas 09/05/2024     Past Surgical History:   Procedure Laterality Date    HERNIA REPAIR       Social History     Socioeconomic History    Marital status: Single   Tobacco Use    Smoking status: Every Day     Current packs/day: 0.50     Types: Cigarettes    Smokeless tobacco: Never   Vaping Use    Vaping status: Every Day   Substance and Sexual Activity    Alcohol use: Never    Drug use: Never    Sexual activity: Defer     History reviewed. No pertinent family history.    Objective   Physical Exam  Vitals reviewed. Exam conducted with a chaperone present.   Cardiovascular:      Rate and Rhythm: Normal rate and regular rhythm.      Heart sounds: Normal heart sounds. No murmur heard.     No gallop.   Pulmonary:      Effort: Pulmonary effort is normal.      Breath sounds: Normal breath sounds.      Comments: Port-A-Cath  Lymphadenopathy:      Cervical: No cervical adenopathy.         Vitals:    12/03/24 0841   BP: 105/62   Pulse: 64   Resp: 20   Temp: 98.1 °F (36.7 °C)   TempSrc: Temporal   SpO2: 99%   Weight: 68.5 kg (151 lb)   PainSc: 0-No pain     ECOG score: 0         PHQ-9 Total Score:                    Result Review :   The following data was reviewed by: Gene Blanchard MD on 12/03/2024:  Lab Results   Component Value Date    HGB 11.0 (L) 12/03/2024    HCT 34.4 (L) 12/03/2024    MCV 85.6 12/03/2024     12/03/2024    WBC 5.79 12/03/2024    NEUTROABS 2.77 12/03/2024    LYMPHSABS 2.00 12/03/2024    MONOSABS 0.86 12/03/2024    EOSABS 0.06 12/03/2024    BASOSABS 0.07 12/03/2024     Lab Results   Component Value Date    GLUCOSE 161 (H) 12/03/2024    BUN 9 12/03/2024    CREATININE 0.81  12/03/2024     12/03/2024    K 4.0 12/03/2024     12/03/2024    CO2 25.0 12/03/2024    CALCIUM 8.4 (L) 12/03/2024    PROTEINTOT 6.3 12/03/2024    ALBUMIN 3.5 12/03/2024    BILITOT 0.3 12/03/2024    ALKPHOS 246 (H) 12/03/2024    AST 54 (H) 12/03/2024    ALT 34 12/03/2024     Lab Results   Component Value Date    MG 1.7 12/10/2021    PHOS 3.4 12/10/2021    FREET4 1.74 06/30/2020    TSH 0.256 (L) 12/06/2021     Lab Results   Component Value Date    IRON 15 (L) 12/08/2021    LABIRON 7 (L) 12/08/2021    TRANSFERRIN 150 (L) 12/08/2021    TIBC 224 (L) 12/08/2021     Lab Results   Component Value Date    FERRITIN 75 12/21/2020    MHTUNFPN74 1,022 (H) 12/08/2021    FOLATE 10.90 12/08/2021     Lab Results   Component Value Date     1,386.0 (H) 09/05/2024             Assessment and Plan    Diagnoses and all orders for this visit:    1. Malignant neoplasm of head of pancreas (Primary)  Assessment & Plan:  Patient is on treatment with FOLFIRINOX.  He was supposed to have interim scans before this visit but missed that appointment.  This will be rescheduled before next visit.  He notes some fatigue and mild nausea.  Blood counts show anemia due to chemotherapy but adequate for treatment.  Proceed with cycle 5 as planned.  I will see him back for cycle 6-day 1 with lab work prior to monitor for toxicities.    Orders:  -     Infusion Appointment Request 10; Future    2. Neuropathy associated with cancer  Assessment & Plan:  Patient is on gabapentin, Cymbalta and ibuprofen as needed.  I will refill ibuprofen today.  Reassess next visit.    Orders:  -     ibuprofen (ADVIL,MOTRIN) 800 MG tablet; Take 1 tablet by mouth Every 8 (Eight) Hours As Needed for Mild Pain.  Dispense: 30 tablet; Refill: 1    Other orders  -     Cancel: dextrose (D5W) 5 % infusion  -     Cancel: palonosetron (ALOXI) injection 0.25 mg  -     Cancel: dexAMETHasone (DECADRON) 12 mg in sodium chloride 0.9 % IVPB  -     Cancel: OXALIplatin (ELOXATIN)  140 mg in dextrose (D5W) 5 % 278 mL chemo IVPB  -     Cancel: leucovorin 660 mg in dextrose (D5W) 5 % 316 mL IVPB  -     Cancel: irinotecan (CAMPTOSAR) 205 mg in dextrose (D5W) 5 % 510.3 mL chemo IVPB  -     Cancel: atropine injection 0.25 mg  -     Cancel: fluorouracil (ADRUCIL) 3,930 mg in sodium chloride 0.9 % 78.6 mL chemo infusion - FOR HOME USE  -     Cancel: Hydrocortisone Sod Suc (PF) (Solu-CORTEF) injection 100 mg  -     Cancel: diphenhydrAMINE (BENADRYL) injection 50 mg  -     Cancel: famotidine (PEPCID) injection 20 mg  -     pegfilgrastim (NEULASTA ONPRO) on-body injector 6 mg            Patient Follow Up: Cycle 6-day 1    Patient was given instructions and counseling regarding his condition or for health maintenance advice. Please see specific information pulled into the AVS if appropriate.     Gene Blanchard MD    12/3/2024

## 2024-12-03 NOTE — PROGRESS NOTES
Outpatient Nutrition Oncology Follow Up    Patient Name: Josue Stern  YOB: 1955  MRN: [id number]      Reason for Consult:  Nutrition referral (ongoing diarrhea)       Today's Weight:  68.5 kg    Weight Change: <1% loss in the past month    Nutrition-related concerns: Diarrhea and Early Satiety    Current PO intake:  Regular foods, making an effort to consume adequate proteins     Current Nutrition Supplement intake: Ensure products (likes Ensure Complete)    Consult or Intervention:  Pt with slight wt decline over the past month.  He continues to have episodes of diarrhea.  Pt is trying to eat adequately.  Isn't drinking chocolate-flavored ONS anymore, but choosing vanilla or strawberry.  Pt is aware of symptoms of steatorrhea and today denies that his stools are symptomatic of fat malabsorption (not greasy, they do not float, reports usual color / no changes).  Pt discussed bowel movements with MD.  Pt was given instruction to take Imodium AD for diarrhea.  Provided more samples of Ensure Complete and coupons.    Nutrition Diagnosis: Unintentional weight loss related to GI dysfunction as evidenced by physiological causes increasing nutrient needs., hypermetabolic state., patient report., and ongoing diarrhea.    Plan: Will follow up per oncology nutrition protocol

## 2024-12-03 NOTE — ASSESSMENT & PLAN NOTE
Patient is on treatment with FOLFIRINOX.  He was supposed to have interim scans before this visit but missed that appointment.  This will be rescheduled before next visit.  He notes some fatigue and mild nausea.  Blood counts show anemia due to chemotherapy but adequate for treatment.  Proceed with cycle 5 as planned.  I will see him back for cycle 6-day 1 with lab work prior to monitor for toxicities.

## 2024-12-03 NOTE — PROGRESS NOTES
Diagnosis: Pancreatic cancer    Reason for Referral: Financial assistance    Content of Visit: OSW met with Mr. Stern in the medical oncology infusion center this morning. He provided a copy of his tax return to finalize his applications through [employer] and [employer] today. He confirmed his landlord received a second check from [employer] for his rent for December and expressed gratitude. OSW submitted Mr. Stern's completed applications to [employer] this morning. OSW support remains available.    Resources/Referrals Provided: Financial assistance - [employer]

## 2024-12-05 ENCOUNTER — DOCUMENTATION (OUTPATIENT)
Dept: ONCOLOGY | Facility: HOSPITAL | Age: 69
End: 2024-12-05
Payer: MEDICARE

## 2024-12-05 ENCOUNTER — HOSPITAL ENCOUNTER (OUTPATIENT)
Dept: ONCOLOGY | Facility: HOSPITAL | Age: 69
Discharge: HOME | End: 2024-12-05
Payer: MEDICARE

## 2024-12-05 DIAGNOSIS — C25.0 MALIGNANT NEOPLASM OF HEAD OF PANCREAS: Primary | ICD-10-CM

## 2024-12-05 PROCEDURE — 96377 APPLICATON ON-BODY INJECTOR: CPT

## 2024-12-05 PROCEDURE — 25010000002 HEPARIN LOCK FLUSH PER 10 UNITS: Performed by: INTERNAL MEDICINE

## 2024-12-05 PROCEDURE — 25010000002 PEGFILGRASTIM 6 MG/0.6ML PREFILLED SYRINGE KIT: Performed by: INTERNAL MEDICINE

## 2024-12-05 RX ORDER — SODIUM CHLORIDE 0.9 % (FLUSH) 0.9 %
20 SYRINGE (ML) INJECTION AS NEEDED
Status: DISCONTINUED | OUTPATIENT
Start: 2024-12-05 | End: 2024-12-06 | Stop reason: HOSPADM

## 2024-12-05 RX ORDER — SODIUM CHLORIDE 0.9 % (FLUSH) 0.9 %
20 SYRINGE (ML) INJECTION AS NEEDED
OUTPATIENT
Start: 2024-12-05

## 2024-12-05 RX ORDER — HEPARIN SODIUM (PORCINE) LOCK FLUSH IV SOLN 100 UNIT/ML 100 UNIT/ML
500 SOLUTION INTRAVENOUS AS NEEDED
Status: DISCONTINUED | OUTPATIENT
Start: 2024-12-05 | End: 2024-12-06 | Stop reason: HOSPADM

## 2024-12-05 RX ORDER — HEPARIN SODIUM (PORCINE) LOCK FLUSH IV SOLN 100 UNIT/ML 100 UNIT/ML
500 SOLUTION INTRAVENOUS AS NEEDED
OUTPATIENT
Start: 2024-12-05

## 2024-12-05 RX ADMIN — PEGFILGRASTIM 6 MG: KIT SUBCUTANEOUS at 14:44

## 2024-12-05 RX ADMIN — HEPARIN 500 UNITS: 100 SYRINGE at 14:41

## 2024-12-05 RX ADMIN — Medication 20 ML: at 14:40

## 2024-12-05 NOTE — PROGRESS NOTES
Diagnosis: Pancreatic cancer    Reason for Referral: Financial assistance    Content of Visit: Mr. Stern provided OSW with a copy of his updated car payment to produce to [employer]. OSW advised Mr. Stern that an email came through from [employer] yesterday evening, advising he's been approved and a check will be mailed within two weeks. He v/u and expressed appreciation. He is heading into work this evening. Encouraged OSW support remains available.    Update 12/6/24: Received confirmation from [employer] that Mr. Stern's application was received and may take 10-12 weeks to be processed. They will reach Mr. Stern via telephone with the outcome.    Resources/Referrals Provided: [employer]

## 2024-12-11 ENCOUNTER — TELEPHONE (OUTPATIENT)
Dept: ONCOLOGY | Facility: HOSPITAL | Age: 69
End: 2024-12-11
Payer: MEDICARE

## 2024-12-11 NOTE — TELEPHONE ENCOUNTER
Mr. Stern has a dental appointment tomorrow morning at 0800 and is inquiring if he can get his tooth pulled. Please advise.

## 2024-12-13 ENCOUNTER — HOSPITAL ENCOUNTER (OUTPATIENT)
Dept: CT IMAGING | Facility: HOSPITAL | Age: 69
Discharge: HOME | End: 2024-12-13
Payer: MEDICARE

## 2024-12-13 DIAGNOSIS — C25.0 MALIGNANT NEOPLASM OF HEAD OF PANCREAS: ICD-10-CM

## 2024-12-13 LAB
ALBUMIN SERPL-MCNC: 3.5 G/DL (ref 3.5–5.2)
ALBUMIN/GLOB SERPL: 1.4 G/DL
ALP SERPL-CCNC: 237 U/L (ref 39–117)
ALT SERPL W P-5'-P-CCNC: 24 U/L (ref 1–41)
ANION GAP SERPL CALCULATED.3IONS-SCNC: 10.7 MMOL/L (ref 5–15)
AST SERPL-CCNC: 37 U/L (ref 1–40)
BASOPHILS # BLD AUTO: 0.06 10*3/MM3 (ref 0–0.2)
BASOPHILS NFR BLD AUTO: 0.5 % (ref 0–1.5)
BILIRUB SERPL-MCNC: 0.2 MG/DL (ref 0–1.2)
BUN SERPL-MCNC: 17 MG/DL (ref 8–23)
BUN/CREAT SERPL: 18.5 (ref 7–25)
CALCIUM SPEC-SCNC: 8.7 MG/DL (ref 8.6–10.5)
CHLORIDE SERPL-SCNC: 109 MMOL/L (ref 98–107)
CO2 SERPL-SCNC: 23.3 MMOL/L (ref 22–29)
CREAT SERPL-MCNC: 0.92 MG/DL (ref 0.76–1.27)
DEPRECATED RDW RBC AUTO: 49.1 FL (ref 37–54)
EGFRCR SERPLBLD CKD-EPI 2021: 89.5 ML/MIN/1.73
EOSINOPHIL # BLD AUTO: 0.06 10*3/MM3 (ref 0–0.4)
EOSINOPHIL NFR BLD AUTO: 0.5 % (ref 0.3–6.2)
ERYTHROCYTE [DISTWIDTH] IN BLOOD BY AUTOMATED COUNT: 16.2 % (ref 12.3–15.4)
GLOBULIN UR ELPH-MCNC: 2.5 GM/DL
GLUCOSE SERPL-MCNC: 108 MG/DL (ref 65–99)
HCT VFR BLD AUTO: 32.5 % (ref 37.5–51)
HGB BLD-MCNC: 10.2 G/DL (ref 13–17.7)
IMM GRANULOCYTES # BLD AUTO: 0.14 10*3/MM3 (ref 0–0.05)
IMM GRANULOCYTES NFR BLD AUTO: 1.1 % (ref 0–0.5)
LYMPHOCYTES # BLD AUTO: 2.51 10*3/MM3 (ref 0.7–3.1)
LYMPHOCYTES NFR BLD AUTO: 18.9 % (ref 19.6–45.3)
MCH RBC QN AUTO: 26.8 PG (ref 26.6–33)
MCHC RBC AUTO-ENTMCNC: 31.4 G/DL (ref 31.5–35.7)
MCV RBC AUTO: 85.5 FL (ref 79–97)
MONOCYTES # BLD AUTO: 2.11 10*3/MM3 (ref 0.1–0.9)
MONOCYTES NFR BLD AUTO: 15.9 % (ref 5–12)
NEUTROPHILS NFR BLD AUTO: 63.1 % (ref 42.7–76)
NEUTROPHILS NFR BLD AUTO: 8.43 10*3/MM3 (ref 1.7–7)
NRBC BLD AUTO-RTO: 0 /100 WBC (ref 0–0.2)
PLATELET # BLD AUTO: 158 10*3/MM3 (ref 140–450)
PMV BLD AUTO: 10.5 FL (ref 6–12)
POTASSIUM SERPL-SCNC: 4 MMOL/L (ref 3.5–5.2)
PROT SERPL-MCNC: 6 G/DL (ref 6–8.5)
RBC # BLD AUTO: 3.8 10*6/MM3 (ref 4.14–5.8)
SODIUM SERPL-SCNC: 143 MMOL/L (ref 136–145)
WBC NRBC COR # BLD AUTO: 13.31 10*3/MM3 (ref 3.4–10.8)

## 2024-12-13 PROCEDURE — 85025 COMPLETE CBC W/AUTO DIFF WBC: CPT | Performed by: INTERNAL MEDICINE

## 2024-12-13 PROCEDURE — 80053 COMPREHEN METABOLIC PANEL: CPT | Performed by: INTERNAL MEDICINE

## 2024-12-13 PROCEDURE — 71260 CT THORAX DX C+: CPT

## 2024-12-13 PROCEDURE — 74177 CT ABD & PELVIS W/CONTRAST: CPT

## 2024-12-13 PROCEDURE — 25510000001 IOPAMIDOL PER 1 ML: Performed by: INTERNAL MEDICINE

## 2024-12-13 RX ORDER — HEPARIN SODIUM (PORCINE) LOCK FLUSH IV SOLN 100 UNIT/ML 100 UNIT/ML
SOLUTION INTRAVENOUS
Status: DISCONTINUED
Start: 2024-12-13 | End: 2024-12-14 | Stop reason: HOSPADM

## 2024-12-13 RX ORDER — IOPAMIDOL 755 MG/ML
100 INJECTION, SOLUTION INTRAVASCULAR
Status: COMPLETED | OUTPATIENT
Start: 2024-12-13 | End: 2024-12-13

## 2024-12-13 RX ADMIN — IOPAMIDOL 100 ML: 755 INJECTION, SOLUTION INTRAVENOUS at 14:03

## 2024-12-17 ENCOUNTER — HOSPITAL ENCOUNTER (OUTPATIENT)
Dept: ONCOLOGY | Facility: HOSPITAL | Age: 69
Discharge: HOME | End: 2024-12-17
Payer: MEDICARE

## 2024-12-17 ENCOUNTER — OFFICE VISIT (OUTPATIENT)
Dept: ONCOLOGY | Facility: HOSPITAL | Age: 69
End: 2024-12-17
Payer: MEDICARE

## 2024-12-17 VITALS
RESPIRATION RATE: 18 BRPM | SYSTOLIC BLOOD PRESSURE: 132 MMHG | DIASTOLIC BLOOD PRESSURE: 87 MMHG | TEMPERATURE: 96.8 F | OXYGEN SATURATION: 99 % | WEIGHT: 151.24 LBS | HEART RATE: 59 BPM | BODY MASS INDEX: 24.41 KG/M2

## 2024-12-17 VITALS
TEMPERATURE: 96.8 F | DIASTOLIC BLOOD PRESSURE: 87 MMHG | HEART RATE: 59 BPM | RESPIRATION RATE: 18 BRPM | OXYGEN SATURATION: 99 % | WEIGHT: 151 LBS | SYSTOLIC BLOOD PRESSURE: 132 MMHG | BODY MASS INDEX: 24.37 KG/M2

## 2024-12-17 DIAGNOSIS — C25.0 MALIGNANT NEOPLASM OF HEAD OF PANCREAS: Primary | ICD-10-CM

## 2024-12-17 DIAGNOSIS — G63 NEUROPATHY ASSOCIATED WITH CANCER: ICD-10-CM

## 2024-12-17 DIAGNOSIS — C80.1 NEUROPATHY ASSOCIATED WITH CANCER: ICD-10-CM

## 2024-12-17 PROCEDURE — G0498 CHEMO EXTEND IV INFUS W/PUMP: HCPCS

## 2024-12-17 PROCEDURE — 25010000003 DEXTROSE 5 % SOLUTION 250 ML FLEX CONT: Performed by: INTERNAL MEDICINE

## 2024-12-17 PROCEDURE — 96368 THER/DIAG CONCURRENT INF: CPT

## 2024-12-17 PROCEDURE — G2211 COMPLEX E/M VISIT ADD ON: HCPCS | Performed by: INTERNAL MEDICINE

## 2024-12-17 PROCEDURE — 1160F RVW MEDS BY RX/DR IN RCRD: CPT | Performed by: INTERNAL MEDICINE

## 2024-12-17 PROCEDURE — 25010000002 OXALIPLATIN PER 0.5 MG: Performed by: INTERNAL MEDICINE

## 2024-12-17 PROCEDURE — 25010000002 LEUCOVORIN CALCIUM PER 50 MG: Performed by: INTERNAL MEDICINE

## 2024-12-17 PROCEDURE — 1159F MED LIST DOCD IN RCRD: CPT | Performed by: INTERNAL MEDICINE

## 2024-12-17 PROCEDURE — 25010000002 IRINOTECAN PER 20 MG: Performed by: INTERNAL MEDICINE

## 2024-12-17 PROCEDURE — 96366 THER/PROPH/DIAG IV INF ADDON: CPT

## 2024-12-17 PROCEDURE — 25010000003 DEXTROSE 5 % SOLUTION: Performed by: INTERNAL MEDICINE

## 2024-12-17 PROCEDURE — 25010000002 PALONOSETRON PER 25 MCG: Performed by: INTERNAL MEDICINE

## 2024-12-17 PROCEDURE — 1125F AMNT PAIN NOTED PAIN PRSNT: CPT | Performed by: INTERNAL MEDICINE

## 2024-12-17 PROCEDURE — 25010000002 DEXAMETHASONE SODIUM PHOSPHATE 120 MG/30ML SOLUTION: Performed by: INTERNAL MEDICINE

## 2024-12-17 PROCEDURE — 25010000002 FLUOROURACIL PER 500 MG: Performed by: INTERNAL MEDICINE

## 2024-12-17 PROCEDURE — 96415 CHEMO IV INFUSION ADDL HR: CPT

## 2024-12-17 PROCEDURE — 96375 TX/PRO/DX INJ NEW DRUG ADDON: CPT

## 2024-12-17 PROCEDURE — 96417 CHEMO IV INFUS EACH ADDL SEQ: CPT

## 2024-12-17 PROCEDURE — 96413 CHEMO IV INFUSION 1 HR: CPT

## 2024-12-17 PROCEDURE — 99214 OFFICE O/P EST MOD 30 MIN: CPT | Performed by: INTERNAL MEDICINE

## 2024-12-17 PROCEDURE — 25010000003 DEXTROSE 5 % SOLUTION 500 ML FLEX CONT: Performed by: INTERNAL MEDICINE

## 2024-12-17 RX ORDER — PALONOSETRON 0.05 MG/ML
0.25 INJECTION, SOLUTION INTRAVENOUS ONCE
Status: CANCELLED | OUTPATIENT
Start: 2024-12-17

## 2024-12-17 RX ORDER — DEXTROSE MONOHYDRATE 50 MG/ML
20 INJECTION, SOLUTION INTRAVENOUS ONCE
Status: CANCELLED | OUTPATIENT
Start: 2024-12-17

## 2024-12-17 RX ORDER — PALONOSETRON 0.05 MG/ML
0.25 INJECTION, SOLUTION INTRAVENOUS ONCE
Status: COMPLETED | OUTPATIENT
Start: 2024-12-17 | End: 2024-12-17

## 2024-12-17 RX ORDER — FAMOTIDINE 10 MG/ML
20 INJECTION, SOLUTION INTRAVENOUS AS NEEDED
Status: DISCONTINUED | OUTPATIENT
Start: 2024-12-17 | End: 2024-12-18 | Stop reason: HOSPADM

## 2024-12-17 RX ORDER — HYDROCORTISONE SODIUM SUCCINATE 100 MG/2ML
100 INJECTION INTRAMUSCULAR; INTRAVENOUS AS NEEDED
Status: DISCONTINUED | OUTPATIENT
Start: 2024-12-17 | End: 2024-12-18 | Stop reason: HOSPADM

## 2024-12-17 RX ORDER — HYDROCORTISONE SODIUM SUCCINATE 100 MG/2ML
100 INJECTION INTRAMUSCULAR; INTRAVENOUS AS NEEDED
Status: CANCELLED | OUTPATIENT
Start: 2024-12-17

## 2024-12-17 RX ORDER — FAMOTIDINE 10 MG/ML
20 INJECTION, SOLUTION INTRAVENOUS AS NEEDED
Status: CANCELLED | OUTPATIENT
Start: 2024-12-17

## 2024-12-17 RX ORDER — DEXTROSE MONOHYDRATE 50 MG/ML
20 INJECTION, SOLUTION INTRAVENOUS ONCE
Status: COMPLETED | OUTPATIENT
Start: 2024-12-17 | End: 2024-12-17

## 2024-12-17 RX ORDER — DIPHENHYDRAMINE HYDROCHLORIDE 50 MG/ML
50 INJECTION INTRAMUSCULAR; INTRAVENOUS AS NEEDED
Status: CANCELLED | OUTPATIENT
Start: 2024-12-17

## 2024-12-17 RX ORDER — DIPHENHYDRAMINE HYDROCHLORIDE 50 MG/ML
50 INJECTION INTRAMUSCULAR; INTRAVENOUS AS NEEDED
Status: DISCONTINUED | OUTPATIENT
Start: 2024-12-17 | End: 2024-12-18 | Stop reason: HOSPADM

## 2024-12-17 RX ORDER — ATROPINE SULFATE 1 MG/ML
0.25 INJECTION, SOLUTION INTRAMUSCULAR; INTRAVENOUS; SUBCUTANEOUS
Status: CANCELLED | OUTPATIENT
Start: 2024-12-17

## 2024-12-17 RX ADMIN — OXALIPLATIN 140 MG: 5 INJECTION, SOLUTION INTRAVENOUS at 10:16

## 2024-12-17 RX ADMIN — FLUOROURACIL 3900 MG: 50 INJECTION, SOLUTION INTRAVENOUS at 14:18

## 2024-12-17 RX ADMIN — LEUCOVORIN CALCIUM 660 MG: 350 INJECTION, POWDER, LYOPHILIZED, FOR SOLUTION INTRAMUSCULAR; INTRAVENOUS at 12:17

## 2024-12-17 RX ADMIN — PALONOSETRON HYDROCHLORIDE 0.25 MG: 0.25 INJECTION INTRAVENOUS at 09:44

## 2024-12-17 RX ADMIN — IRINOTECAN HYDROCHLORIDE 205 MG: 20 INJECTION, SOLUTION INTRAVENOUS at 12:46

## 2024-12-17 RX ADMIN — ATROPINE SULFATE 0.25 MG: 0.4 INJECTION, SOLUTION INTRAVENOUS at 12:46

## 2024-12-17 RX ADMIN — DEXAMETHASONE SODIUM PHOSPHATE 12 MG: 4 INJECTION, SOLUTION INTRA-ARTICULAR; INTRALESIONAL; INTRAMUSCULAR; INTRAVENOUS; SOFT TISSUE at 09:45

## 2024-12-17 RX ADMIN — DEXTROSE MONOHYDRATE 20 ML/HR: 50 INJECTION, SOLUTION INTRAVENOUS at 09:44

## 2024-12-17 NOTE — ASSESSMENT & PLAN NOTE
Patient reports his symptoms are stable.  Continue current medication including gabapentin and Cymbalta.

## 2024-12-17 NOTE — ASSESSMENT & PLAN NOTE
Patient is on neoadjuvant treatment with FOLFIRINOX.  He has some mild nausea but antiemetics are effective.  Interim scans show excellent response with near complete resolution of the previously noted pancreatic mass.  His tumor marker has dropped significantly.  His CBC is notable for mild anemia related to chemotherapy but stable.  Adequate for treatment.  Proceed with cycle 6 as planned.  I will see him back for cycle 7-day 1 with lab work prior to monitor for toxicities.  I would plan for surgical reevaluation after cycle 8.

## 2024-12-17 NOTE — PROGRESS NOTES
Chief Complaint  No chief complaint on file.    Forrest Duong MD Smith, Forrest Leon MD    Subjective          Josue Stern presents to [facility] for ongoing treatment of his pancreatic cancer.  He is on neoadjuvant FOLFIRINOX.  He notes some nausea with the regimen but antiemetics are effective.  He is still eating and drinking adequately.  He denies abdominal pain, diarrhea.  He notes neuropathy in his feet but stable.  No issues from his Port-A-Cath.    Oncology/Hematology History   Malignant neoplasm of head of pancreas   9/5/2024 Initial Diagnosis    Malignant neoplasm of pancreas     9/5/2024 -  Chemotherapy    OP CENTRAL VENOUS ACCESS DEVICE Access, Care, and Maintenance (CVAD)     9/17/2024 -  Chemotherapy    OP PANCREATIC mFOLFIRINOX (OXALIplatin / Leucovorin / Irinotecan / Fluorouracil CIV)         Current Outpatient Medications on File Prior to Visit   Medication Sig Dispense Refill    albuterol sulfate  (90 Base) MCG/ACT inhaler Inhale 2 puffs Every 4 (Four) Hours As Needed for Wheezing. 18 g 0    amLODIPine (Norvasc) 5 MG tablet Take 1 tablet by mouth Daily.      amoxicillin (AMOXIL) 500 MG capsule Take 1 capsule by mouth 3 times a day.      azithromycin (Zithromax Z-Tomasz) 250 MG tablet Take 2 tablets by mouth on day 1, then 1 tablet daily on days 2-5 6 tablet 0    buprenorphine-naloxone (Suboxone) 8-2 MG film film Place 1 film under the tongue 2 (Two) Times a Day.      busPIRone (BUSPAR) 15 MG tablet Take 1 tablet by mouth 3 (Three) Times a Day.      cetirizine (ZyrTEC Allergy) 10 MG tablet Take 1 tablet by mouth Daily.      DULoxetine (CYMBALTA) 30 MG capsule TAKE 1 CAPSULE BY MOUTH DAILY 30 capsule 1    gabapentin (NEURONTIN) 800 MG tablet Take 1 tablet by mouth 3 (Three) Times a Day.      ibuprofen (ADVIL,MOTRIN) 800 MG tablet Take 1 tablet by mouth Every 8 (Eight) Hours As Needed for Mild Pain. 30 tablet 1    lidocaine-prilocaine  (EMLA) 2.5-2.5 % cream Apply 1 Application topically to the appropriate area as directed As Needed for Injection Site Pain. 30 g 1    ondansetron (ZOFRAN) 8 MG tablet Take 1 tablet by mouth 3 (Three) Times a Day As Needed for Nausea or Vomiting. 30 tablet 5    prazosin (MINIPRESS) 1 MG capsule Take 1 capsule by mouth Every Night.      predniSONE (DELTASONE) 50 MG tablet Take 1 tablet by mouth Daily. 5 tablet 0    propranolol LA (Inderal LA) 60 MG 24 hr capsule Take 1 capsule by mouth Daily.       Current Facility-Administered Medications on File Prior to Visit   Medication Dose Route Frequency Provider Last Rate Last Admin    atropine injection 0.25 mg  0.25 mg Intravenous Q15 Min PRN Gene Blanchard MD        dexAMETHasone (DECADRON) IVPB 12 mg  12 mg Intravenous Once Gene Blanchard MD        dextrose (D5W) 5 % infusion  20 mL/hr Intravenous Once Gene Blanchard MD        diphenhydrAMINE (BENADRYL) injection 50 mg  50 mg Intravenous PRN Gene Blanchard MD        famotidine (PEPCID) injection 20 mg  20 mg Intravenous PRN Gene Blanchard MD        fluorouracil (ADRUCIL) 3,930 mg in sodium chloride 0.9 % 78.6 mL chemo infusion - FOR HOME USE  2,160 mg/m2 (Treatment Plan Recorded) Intravenous Once Gene Blanchard MD        Hydrocortisone Sod Suc (PF) (Solu-CORTEF) injection 100 mg  100 mg Intravenous PRN Gene Blanchard MD        irinotecan (CAMPTOSAR) 205 mg in dextrose (D5W) 5 % 510.3 mL chemo IVPB  112.5 mg/m2 (Treatment Plan Recorded) Intravenous Once Gene Blanchard MD        leucovorin 660 mg in dextrose (D5W) 5 % 316 mL IVPB  360 mg/m2 (Treatment Plan Recorded) Intravenous Once Gene Blanchard MD        OXALIplatin (ELOXATIN) 140 mg in dextrose (D5W) 5 % 278 mL chemo IVPB  76.5 mg/m2 (Treatment Plan Recorded) Intravenous Once Gene Blanchard MD        palonosetron (ALOXI) injection 0.25 mg  0.25 mg Intravenous Once Gene Blanchard MD           Allergies   Allergen Reactions    Aspirin  Nausea Only     Past Medical History:   Diagnosis Date    Allergies     Asthma     GERD (gastroesophageal reflux disease)     Hypertension     Malignant neoplasm of pancreas 09/05/2024     Past Surgical History:   Procedure Laterality Date    HERNIA REPAIR       Social History     Socioeconomic History    Marital status: Single   Tobacco Use    Smoking status: Every Day     Current packs/day: 0.50     Types: Cigarettes    Smokeless tobacco: Never   Vaping Use    Vaping status: Every Day   Substance and Sexual Activity    Alcohol use: Never    Drug use: Never    Sexual activity: Defer     History reviewed. No pertinent family history.    Objective   Physical Exam  Vitals reviewed. Exam conducted with a chaperone present.   Cardiovascular:      Rate and Rhythm: Normal rate and regular rhythm.      Heart sounds: Normal heart sounds. No murmur heard.     No gallop.   Pulmonary:      Effort: Pulmonary effort is normal.      Breath sounds: Normal breath sounds.      Comments: Port-A-Cath  Abdominal:      General: Bowel sounds are normal.   Lymphadenopathy:      Cervical: No cervical adenopathy.   Psychiatric:         Mood and Affect: Mood normal.         Behavior: Behavior normal.         Vitals:    12/17/24 0842   BP: 132/87   Pulse: 59   Resp: 18   Temp: 96.8 °F (36 °C)   TempSrc: Temporal   SpO2: 99%   Weight: 68.5 kg (151 lb)   PainSc:   4     ECOG score: 0         PHQ-9 Total Score:                    Result Review :   The following data was reviewed by: Gene Blanchard MD on 12/17/2024:  Lab Results   Component Value Date    HGB 10.2 (L) 12/13/2024    HCT 32.5 (L) 12/13/2024    MCV 85.5 12/13/2024     12/13/2024    WBC 13.31 (H) 12/13/2024    NEUTROABS 8.43 (H) 12/13/2024    LYMPHSABS 2.51 12/13/2024    MONOSABS 2.11 (H) 12/13/2024    EOSABS 0.06 12/13/2024    BASOSABS 0.06 12/13/2024     Lab Results   Component Value Date    GLUCOSE 108 (H) 12/13/2024    BUN 17 12/13/2024    CREATININE 0.92 12/13/2024      12/13/2024    K 4.0 12/13/2024     (H) 12/13/2024    CO2 23.3 12/13/2024    CALCIUM 8.7 12/13/2024    PROTEINTOT 6.0 12/13/2024    ALBUMIN 3.5 12/13/2024    BILITOT 0.2 12/13/2024    ALKPHOS 237 (H) 12/13/2024    AST 37 12/13/2024    ALT 24 12/13/2024     Lab Results   Component Value Date    MG 1.7 12/10/2021    PHOS 3.4 12/10/2021    FREET4 1.74 06/30/2020    TSH 0.256 (L) 12/06/2021     Lab Results   Component Value Date    IRON 15 (L) 12/08/2021    LABIRON 7 (L) 12/08/2021    TRANSFERRIN 150 (L) 12/08/2021    TIBC 224 (L) 12/08/2021     Lab Results   Component Value Date    FERRITIN 75 12/21/2020    [id number] 1,022 (H) 12/08/2021    FOLATE 10.90 12/08/2021     Lab Results   Component Value Date     242.0 (H) 12/03/2024       Data reviewed : Radiologic studies CT chest, abdomen, pelvis reviewed       Assessment and Plan    Diagnoses and all orders for this visit:    1. Malignant neoplasm of head of pancreas (Primary)  Assessment & Plan:  Patient is on neoadjuvant treatment with FOLFIRINOX.  He has some mild nausea but antiemetics are effective.  Interim scans show excellent response with near complete resolution of the previously noted pancreatic mass.  His tumor marker has dropped significantly.  His CBC is notable for mild anemia related to chemotherapy but stable.  Adequate for treatment.  Proceed with cycle 6 as planned.  I will see him back for cycle 7-day 1 with lab work prior to monitor for toxicities.  I would plan for surgical reevaluation after cycle 8.    Orders:  -     Infusion Appointment Request 10; Future    2. Neuropathy associated with cancer  Assessment & Plan:  Patient reports his symptoms are stable.  Continue current medication including gabapentin and Cymbalta.      Other orders  -     Cancel: dextrose (D5W) 5 % infusion  -     Cancel: palonosetron (ALOXI) injection 0.25 mg  -     Cancel: dexAMETHasone (DECADRON) 12 mg in sodium chloride 0.9 % IVPB  -     Cancel:  OXALIplatin (ELOXATIN) 140 mg in dextrose (D5W) 5 % 278 mL chemo IVPB  -     Cancel: leucovorin 660 mg in dextrose (D5W) 5 % 316 mL IVPB  -     Cancel: irinotecan (CAMPTOSAR) 205 mg in dextrose (D5W) 5 % 510.3 mL chemo IVPB  -     Cancel: atropine injection 0.25 mg  -     Cancel: fluorouracil (ADRUCIL) 3,930 mg in sodium chloride 0.9 % 78.6 mL chemo infusion - FOR HOME USE  -     Cancel: Hydrocortisone Sod Suc (PF) (Solu-CORTEF) injection 100 mg  -     Cancel: diphenhydrAMINE (BENADRYL) injection 50 mg  -     Cancel: famotidine (PEPCID) injection 20 mg  -     pegfilgrastim (NEULASTA ONPRO) on-body injector 6 mg            Patient Follow Up: Cycle 7-day 1    Patient was given instructions and counseling regarding his condition or for health maintenance advice. Please see specific information pulled into the AVS if appropriate.     Gene Blanchard MD    12/17/2024

## 2024-12-19 ENCOUNTER — HOSPITAL ENCOUNTER (OUTPATIENT)
Dept: ONCOLOGY | Facility: HOSPITAL | Age: 69
Discharge: HOME | End: 2024-12-19
Payer: MEDICARE

## 2024-12-19 VITALS
HEART RATE: 66 BPM | TEMPERATURE: 98 F | RESPIRATION RATE: 18 BRPM | DIASTOLIC BLOOD PRESSURE: 84 MMHG | SYSTOLIC BLOOD PRESSURE: 120 MMHG | OXYGEN SATURATION: 95 %

## 2024-12-19 DIAGNOSIS — C25.0 MALIGNANT NEOPLASM OF HEAD OF PANCREAS: Primary | ICD-10-CM

## 2024-12-19 PROCEDURE — 25010000002 PEGFILGRASTIM 6 MG/0.6ML PREFILLED SYRINGE KIT: Performed by: INTERNAL MEDICINE

## 2024-12-19 PROCEDURE — 25010000002 HEPARIN LOCK FLUSH PER 10 UNITS: Performed by: INTERNAL MEDICINE

## 2024-12-19 PROCEDURE — 96377 APPLICATON ON-BODY INJECTOR: CPT

## 2024-12-19 RX ORDER — SODIUM CHLORIDE 0.9 % (FLUSH) 0.9 %
20 SYRINGE (ML) INJECTION AS NEEDED
Status: DISCONTINUED | OUTPATIENT
Start: 2024-12-19 | End: 2024-12-20 | Stop reason: HOSPADM

## 2024-12-19 RX ORDER — HEPARIN SODIUM (PORCINE) LOCK FLUSH IV SOLN 100 UNIT/ML 100 UNIT/ML
500 SOLUTION INTRAVENOUS AS NEEDED
OUTPATIENT
Start: 2024-12-19

## 2024-12-19 RX ORDER — HEPARIN SODIUM (PORCINE) LOCK FLUSH IV SOLN 100 UNIT/ML 100 UNIT/ML
500 SOLUTION INTRAVENOUS AS NEEDED
Status: DISCONTINUED | OUTPATIENT
Start: 2024-12-19 | End: 2024-12-20 | Stop reason: HOSPADM

## 2024-12-19 RX ORDER — SODIUM CHLORIDE 0.9 % (FLUSH) 0.9 %
20 SYRINGE (ML) INJECTION AS NEEDED
OUTPATIENT
Start: 2024-12-19

## 2024-12-19 RX ADMIN — HEPARIN 500 UNITS: 100 SYRINGE at 15:11

## 2024-12-19 RX ADMIN — PEGFILGRASTIM 6 MG: KIT SUBCUTANEOUS at 15:16

## 2024-12-19 RX ADMIN — Medication 20 ML: at 15:11

## 2024-12-31 ENCOUNTER — HOSPITAL ENCOUNTER (OUTPATIENT)
Dept: ONCOLOGY | Facility: HOSPITAL | Age: 69
Discharge: HOME | End: 2024-12-31
Payer: MEDICARE

## 2024-12-31 ENCOUNTER — OFFICE VISIT (OUTPATIENT)
Dept: ONCOLOGY | Facility: HOSPITAL | Age: 69
End: 2024-12-31
Payer: MEDICARE

## 2024-12-31 ENCOUNTER — DOCUMENTATION (OUTPATIENT)
Dept: ONCOLOGY | Facility: HOSPITAL | Age: 69
End: 2024-12-31
Payer: MEDICARE

## 2024-12-31 VITALS
OXYGEN SATURATION: 98 % | SYSTOLIC BLOOD PRESSURE: 158 MMHG | DIASTOLIC BLOOD PRESSURE: 94 MMHG | HEART RATE: 57 BPM | TEMPERATURE: 97 F | WEIGHT: 151 LBS | RESPIRATION RATE: 18 BRPM | BODY MASS INDEX: 24.37 KG/M2

## 2024-12-31 VITALS
HEART RATE: 57 BPM | OXYGEN SATURATION: 98 % | BODY MASS INDEX: 24.45 KG/M2 | TEMPERATURE: 97 F | SYSTOLIC BLOOD PRESSURE: 158 MMHG | DIASTOLIC BLOOD PRESSURE: 94 MMHG | WEIGHT: 151.5 LBS | RESPIRATION RATE: 18 BRPM

## 2024-12-31 DIAGNOSIS — C25.0 MALIGNANT NEOPLASM OF HEAD OF PANCREAS: Primary | ICD-10-CM

## 2024-12-31 LAB
ALBUMIN SERPL-MCNC: 3.6 G/DL (ref 3.5–5.2)
ALBUMIN/GLOB SERPL: 1.6 G/DL
ALP SERPL-CCNC: 245 U/L (ref 39–117)
ALT SERPL W P-5'-P-CCNC: 26 U/L (ref 1–41)
ANION GAP SERPL CALCULATED.3IONS-SCNC: 8.6 MMOL/L (ref 5–15)
AST SERPL-CCNC: 43 U/L (ref 1–40)
BASOPHILS # BLD AUTO: 0.03 10*3/MM3 (ref 0–0.2)
BASOPHILS NFR BLD AUTO: 0.4 % (ref 0–1.5)
BILIRUB SERPL-MCNC: 0.3 MG/DL (ref 0–1.2)
BUN SERPL-MCNC: 8 MG/DL (ref 8–23)
BUN/CREAT SERPL: 9.9 (ref 7–25)
CALCIUM SPEC-SCNC: 8.3 MG/DL (ref 8.6–10.5)
CHLORIDE SERPL-SCNC: 109 MMOL/L (ref 98–107)
CO2 SERPL-SCNC: 25.4 MMOL/L (ref 22–29)
CREAT SERPL-MCNC: 0.81 MG/DL (ref 0.76–1.27)
DEPRECATED RDW RBC AUTO: 59.7 FL (ref 37–54)
EGFRCR SERPLBLD CKD-EPI 2021: 94.8 ML/MIN/1.73
EOSINOPHIL # BLD AUTO: 0.08 10*3/MM3 (ref 0–0.4)
EOSINOPHIL NFR BLD AUTO: 0.9 % (ref 0.3–6.2)
ERYTHROCYTE [DISTWIDTH] IN BLOOD BY AUTOMATED COUNT: 18.7 % (ref 12.3–15.4)
GLOBULIN UR ELPH-MCNC: 2.3 GM/DL
GLUCOSE SERPL-MCNC: 111 MG/DL (ref 65–99)
HCT VFR BLD AUTO: 33.2 % (ref 37.5–51)
HGB BLD-MCNC: 10.3 G/DL (ref 13–17.7)
IMM GRANULOCYTES # BLD AUTO: 0.11 10*3/MM3 (ref 0–0.05)
IMM GRANULOCYTES NFR BLD AUTO: 1.3 % (ref 0–0.5)
LYMPHOCYTES # BLD AUTO: 2.01 10*3/MM3 (ref 0.7–3.1)
LYMPHOCYTES NFR BLD AUTO: 23.8 % (ref 19.6–45.3)
MCH RBC QN AUTO: 27.5 PG (ref 26.6–33)
MCHC RBC AUTO-ENTMCNC: 31 G/DL (ref 31.5–35.7)
MCV RBC AUTO: 88.8 FL (ref 79–97)
MONOCYTES # BLD AUTO: 1.19 10*3/MM3 (ref 0.1–0.9)
MONOCYTES NFR BLD AUTO: 14.1 % (ref 5–12)
NEUTROPHILS NFR BLD AUTO: 5.01 10*3/MM3 (ref 1.7–7)
NEUTROPHILS NFR BLD AUTO: 59.5 % (ref 42.7–76)
NRBC BLD AUTO-RTO: 0 /100 WBC (ref 0–0.2)
PLATELET # BLD AUTO: 145 10*3/MM3 (ref 140–450)
PMV BLD AUTO: 11.4 FL (ref 6–12)
POTASSIUM SERPL-SCNC: 3.8 MMOL/L (ref 3.5–5.2)
PROT SERPL-MCNC: 5.9 G/DL (ref 6–8.5)
RBC # BLD AUTO: 3.74 10*6/MM3 (ref 4.14–5.8)
SODIUM SERPL-SCNC: 143 MMOL/L (ref 136–145)
WBC NRBC COR # BLD AUTO: 8.43 10*3/MM3 (ref 3.4–10.8)

## 2024-12-31 PROCEDURE — 25010000003 DEXTROSE 5 % SOLUTION 250 ML FLEX CONT: Performed by: INTERNAL MEDICINE

## 2024-12-31 PROCEDURE — 96375 TX/PRO/DX INJ NEW DRUG ADDON: CPT

## 2024-12-31 PROCEDURE — 80053 COMPREHEN METABOLIC PANEL: CPT | Performed by: INTERNAL MEDICINE

## 2024-12-31 PROCEDURE — 96413 CHEMO IV INFUSION 1 HR: CPT

## 2024-12-31 PROCEDURE — 25010000002 DEXAMETHASONE SODIUM PHOSPHATE 120 MG/30ML SOLUTION: Performed by: INTERNAL MEDICINE

## 2024-12-31 PROCEDURE — 96417 CHEMO IV INFUS EACH ADDL SEQ: CPT

## 2024-12-31 PROCEDURE — 25010000002 IRINOTECAN PER 20 MG: Performed by: INTERNAL MEDICINE

## 2024-12-31 PROCEDURE — 85025 COMPLETE CBC W/AUTO DIFF WBC: CPT | Performed by: INTERNAL MEDICINE

## 2024-12-31 PROCEDURE — 25010000003 DEXTROSE 5 % SOLUTION: Performed by: INTERNAL MEDICINE

## 2024-12-31 PROCEDURE — 25010000002 PALONOSETRON PER 25 MCG: Performed by: INTERNAL MEDICINE

## 2024-12-31 PROCEDURE — G0498 CHEMO EXTEND IV INFUS W/PUMP: HCPCS

## 2024-12-31 PROCEDURE — 25010000002 FLUOROURACIL PER 500 MG: Performed by: INTERNAL MEDICINE

## 2024-12-31 PROCEDURE — 96368 THER/DIAG CONCURRENT INF: CPT

## 2024-12-31 PROCEDURE — 25010000002 LEUCOVORIN CALCIUM PER 50 MG: Performed by: INTERNAL MEDICINE

## 2024-12-31 PROCEDURE — 25010000002 OXALIPLATIN PER 0.5 MG: Performed by: INTERNAL MEDICINE

## 2024-12-31 PROCEDURE — 25010000003 DEXTROSE 5 % SOLUTION 500 ML FLEX CONT: Performed by: INTERNAL MEDICINE

## 2024-12-31 PROCEDURE — 96415 CHEMO IV INFUSION ADDL HR: CPT

## 2024-12-31 RX ORDER — HYDROCORTISONE SODIUM SUCCINATE 100 MG/2ML
100 INJECTION INTRAMUSCULAR; INTRAVENOUS AS NEEDED
Status: DISCONTINUED | OUTPATIENT
Start: 2024-12-31 | End: 2025-01-01 | Stop reason: HOSPADM

## 2024-12-31 RX ORDER — DIPHENHYDRAMINE HYDROCHLORIDE 50 MG/ML
50 INJECTION INTRAMUSCULAR; INTRAVENOUS AS NEEDED
Status: DISCONTINUED | OUTPATIENT
Start: 2024-12-31 | End: 2025-01-01 | Stop reason: HOSPADM

## 2024-12-31 RX ORDER — PALONOSETRON 0.05 MG/ML
0.25 INJECTION, SOLUTION INTRAVENOUS ONCE
Status: COMPLETED | OUTPATIENT
Start: 2024-12-31 | End: 2024-12-31

## 2024-12-31 RX ORDER — DIPHENHYDRAMINE HYDROCHLORIDE 50 MG/ML
50 INJECTION INTRAMUSCULAR; INTRAVENOUS AS NEEDED
Status: CANCELLED | OUTPATIENT
Start: 2024-12-31

## 2024-12-31 RX ORDER — FAMOTIDINE 10 MG/ML
20 INJECTION, SOLUTION INTRAVENOUS AS NEEDED
Status: CANCELLED | OUTPATIENT
Start: 2024-12-31

## 2024-12-31 RX ORDER — PALONOSETRON 0.05 MG/ML
0.25 INJECTION, SOLUTION INTRAVENOUS ONCE
Status: CANCELLED | OUTPATIENT
Start: 2024-12-31

## 2024-12-31 RX ORDER — DEXTROSE MONOHYDRATE 50 MG/ML
20 INJECTION, SOLUTION INTRAVENOUS ONCE
Status: COMPLETED | OUTPATIENT
Start: 2024-12-31 | End: 2024-12-31

## 2024-12-31 RX ORDER — HYDROCORTISONE SODIUM SUCCINATE 100 MG/2ML
100 INJECTION INTRAMUSCULAR; INTRAVENOUS AS NEEDED
Status: CANCELLED | OUTPATIENT
Start: 2024-12-31

## 2024-12-31 RX ORDER — ONDANSETRON 8 MG/1
8 TABLET, FILM COATED ORAL 3 TIMES DAILY PRN
Qty: 30 TABLET | Refills: 5 | Status: SHIPPED | OUTPATIENT
Start: 2024-12-31

## 2024-12-31 RX ORDER — ATROPINE SULFATE 1 MG/ML
0.25 INJECTION, SOLUTION INTRAMUSCULAR; INTRAVENOUS; SUBCUTANEOUS
Status: CANCELLED | OUTPATIENT
Start: 2024-12-31

## 2024-12-31 RX ORDER — FAMOTIDINE 10 MG/ML
20 INJECTION, SOLUTION INTRAVENOUS AS NEEDED
Status: DISCONTINUED | OUTPATIENT
Start: 2024-12-31 | End: 2025-01-01 | Stop reason: HOSPADM

## 2024-12-31 RX ORDER — DEXTROSE MONOHYDRATE 50 MG/ML
20 INJECTION, SOLUTION INTRAVENOUS ONCE
Status: CANCELLED | OUTPATIENT
Start: 2024-12-31

## 2024-12-31 RX ADMIN — LEUCOVORIN CALCIUM 660 MG: 350 INJECTION, POWDER, LYOPHILIZED, FOR SOLUTION INTRAMUSCULAR; INTRAVENOUS at 12:01

## 2024-12-31 RX ADMIN — ATROPINE SULFATE 0.25 MG: 0.4 INJECTION, SOLUTION INTRAVENOUS at 12:59

## 2024-12-31 RX ADMIN — DEXAMETHASONE SODIUM PHOSPHATE 12 MG: 4 INJECTION, SOLUTION INTRA-ARTICULAR; INTRALESIONAL; INTRAMUSCULAR; INTRAVENOUS; SOFT TISSUE at 09:29

## 2024-12-31 RX ADMIN — OXALIPLATIN 140 MG: 5 INJECTION, SOLUTION INTRAVENOUS at 10:00

## 2024-12-31 RX ADMIN — FLUOROURACIL 3900 MG: 50 INJECTION, SOLUTION INTRAVENOUS at 14:18

## 2024-12-31 RX ADMIN — PALONOSETRON HYDROCHLORIDE 0.25 MG: 0.25 INJECTION INTRAVENOUS at 09:30

## 2024-12-31 RX ADMIN — IRINOTECAN HYDROCHLORIDE 205 MG: 20 INJECTION, SOLUTION INTRAVENOUS at 12:38

## 2024-12-31 RX ADMIN — DEXTROSE MONOHYDRATE 20 ML/HR: 50 INJECTION, SOLUTION INTRAVENOUS at 09:29

## 2024-12-31 NOTE — PROGRESS NOTES
Diagnosis: Pancreatic cancer    Reason for Referral: Financial assistance    Content of Visit: OSW assistance requested by Mr. Stern today in the medical oncology [facility] center. Mr. Stern presented in a good mood with matching affect. He advised he had to have two teeth pulled and is inquiring if Anastasia Hayes may be able to help with his dental bill. OSW submitted this to [employer] this afternoon. No additional needs identified at this time. Mr. Stern expressed appreciation for the financial assistance he's received thus far, acknowledging how helpful this has been. He was forward thinking and hopeful today, informing OSW that he'll be going up to [place] in the near future for surgical re-evalution. Encouraged OSW support remains available.    Resources/Referrals Provided: [employer] [facility] [facility]

## 2024-12-31 NOTE — PROGRESS NOTES
Chief Complaint  Malignant neoplasm of head of pancreas    Forrest Duong MD Smith, Forrest Leon MD    Subjective          Josue Stern presents to [facility] for ongoing treatment of his pancreatic cancer.  He is on neoadjuvant FOLFIRINOX.  He notes some mild nausea but his antiemetics are effective.  Neuropathy from the regimen but stable.  He denies any masses, adenopathy, new sites or pains.  His appetite has level.  No issues from Port-A-Cath.    Oncology/Hematology History   Malignant neoplasm of head of pancreas   9/5/2024 Initial Diagnosis    Malignant neoplasm of pancreas     9/5/2024 -  Chemotherapy    OP CENTRAL VENOUS ACCESS DEVICE Access, Care, and Maintenance (CVAD)     9/17/2024 -  Chemotherapy    OP PANCREATIC mFOLFIRINOX (OXALIplatin / Leucovorin / Irinotecan / Fluorouracil CIV)         Current Outpatient Medications on File Prior to Visit   Medication Sig Dispense Refill    albuterol sulfate  (90 Base) MCG/ACT inhaler Inhale 2 puffs Every 4 (Four) Hours As Needed for Wheezing. 18 g 0    amLODIPine (Norvasc) 5 MG tablet Take 1 tablet by mouth Daily.      amoxicillin (AMOXIL) 500 MG capsule Take 1 capsule by mouth 3 times a day.      azithromycin (Zithromax Z-Tomasz) 250 MG tablet Take 2 tablets by mouth on day 1, then 1 tablet daily on days 2-5 6 tablet 0    buprenorphine-naloxone (Suboxone) 8-2 MG film film Place 1 film under the tongue 2 (Two) Times a Day.      busPIRone (BUSPAR) 15 MG tablet Take 1 tablet by mouth 3 (Three) Times a Day.      cetirizine (ZyrTEC Allergy) 10 MG tablet Take 1 tablet by mouth Daily.      DULoxetine (CYMBALTA) 30 MG capsule TAKE 1 CAPSULE BY MOUTH DAILY 30 capsule 1    gabapentin (NEURONTIN) 800 MG tablet Take 1 tablet by mouth 3 (Three) Times a Day.      ibuprofen (ADVIL,MOTRIN) 800 MG tablet Take 1 tablet by mouth Every 8 (Eight) Hours As Needed for Mild Pain. 30 tablet 1    lidocaine-prilocaine (EMLA)  2.5-2.5 % cream Apply 1 Application topically to the appropriate area as directed As Needed for Injection Site Pain. 30 g 1    prazosin (MINIPRESS) 1 MG capsule Take 1 capsule by mouth Every Night.      predniSONE (DELTASONE) 50 MG tablet Take 1 tablet by mouth Daily. 5 tablet 0    propranolol LA (Inderal LA) 60 MG 24 hr capsule Take 1 capsule by mouth Daily.       Current Facility-Administered Medications on File Prior to Visit   Medication Dose Route Frequency Provider Last Rate Last Admin    [COMPLETED] dextrose (D5W) 5 % infusion  20 mL/hr Intravenous Once Gene Blanchard MD   Stopped at 12/31/24 1417    [COMPLETED] irinotecan (CAMPTOSAR) 205 mg in dextrose (D5W) 5 % 560.3 mL chemo IVPB  112.5 mg/m2 (Treatment Plan Recorded) Intravenous Once Gene Blanchard MD   Stopped at 12/31/24 1416    [COMPLETED] leucovorin 660 mg in dextrose (D5W) 5 % 308 mL IVPB  360 mg/m2 (Treatment Plan Recorded) Intravenous Once Gene Blanchard MD   Stopped at 12/31/24 1257    [COMPLETED] OXALIplatin (ELOXATIN) 140 mg in dextrose (D5W) 5 % 303 mL chemo IVPB  76.5 mg/m2 (Treatment Plan Recorded) Intravenous Once Gene Blanchard MD   Stopped at 12/31/24 1200    [DISCONTINUED] atropine sulfate injection 0.25 mg  0.25 mg Intravenous Q15 Min PRN Gene Blanchard MD   0.25 mg at 12/31/24 1259    [DISCONTINUED] diphenhydrAMINE (BENADRYL) injection 50 mg  50 mg Intravenous PRN Gene Blanchard MD        [DISCONTINUED] famotidine (PEPCID) injection 20 mg  20 mg Intravenous PRN Gene Blanchard MD        [DISCONTINUED] fluorouracil (ADRUCIL) 3,900 mg in sodium chloride 0.9 % 138 mL chemo infusion - FOR HOME USE  3,900 mg Intravenous Once Gene Blanchard MD   3,900 mg at 12/31/24 1418    [DISCONTINUED] Hydrocortisone Sod Suc (PF) (Solu-CORTEF) injection 100 mg  100 mg Intravenous PRN Gene Blanchard MD           Allergies   Allergen Reactions    Aspirin Nausea Only     Past Medical History:   Diagnosis Date    Allergies     Asthma      GERD (gastroesophageal reflux disease)     Hypertension     Malignant neoplasm of pancreas 09/05/2024     Past Surgical History:   Procedure Laterality Date    HERNIA REPAIR       Social History     Socioeconomic History    Marital status: Single   Tobacco Use    Smoking status: Every Day     Current packs/day: 0.50     Types: Cigarettes    Smokeless tobacco: Never   Vaping Use    Vaping status: Every Day   Substance and Sexual Activity    Alcohol use: Never    Drug use: Never    Sexual activity: Defer     History reviewed. No pertinent family history.    Objective   Physical Exam  Vitals reviewed. Exam conducted with a chaperone present.   Cardiovascular:      Rate and Rhythm: Normal rate and regular rhythm.      Heart sounds: Normal heart sounds. No murmur heard.     No gallop.   Pulmonary:      Effort: Pulmonary effort is normal.      Breath sounds: Normal breath sounds.      Comments: Port-A-Cath  Abdominal:      General: Bowel sounds are normal.   Musculoskeletal:      Right lower leg: No edema.      Left lower leg: No edema.   Lymphadenopathy:      Cervical: No cervical adenopathy.   Psychiatric:         Mood and Affect: Mood normal.         Behavior: Behavior normal.         Vitals:    12/31/24 0828   BP: 158/94   Pulse: 57   Resp: 18   Temp: 97 °F (36.1 °C)   TempSrc: Temporal   SpO2: 98%   Weight: 68.5 kg (151 lb)   PainSc: 0-No pain     ECOG score: 0         PHQ-9 Total Score:                    Result Review :   The following data was reviewed by: Gene Blanchard MD on 12/31/2024:  Lab Results   Component Value Date    HGB 10.3 (L) 12/31/2024    HCT 33.2 (L) 12/31/2024    MCV 88.8 12/31/2024     12/31/2024    WBC 8.43 12/31/2024    NEUTROABS 5.01 12/31/2024    LYMPHSABS 2.01 12/31/2024    MONOSABS 1.19 (H) 12/31/2024    EOSABS 0.08 12/31/2024    BASOSABS 0.03 12/31/2024     Lab Results   Component Value Date    GLUCOSE 111 (H) 12/31/2024    BUN 8 12/31/2024    CREATININE 0.81 12/31/2024    NA  143 12/31/2024    K 3.8 12/31/2024     (H) 12/31/2024    CO2 25.4 12/31/2024    CALCIUM 8.3 (L) 12/31/2024    PROTEINTOT 5.9 (L) 12/31/2024    ALBUMIN 3.6 12/31/2024    BILITOT 0.3 12/31/2024    ALKPHOS 245 (H) 12/31/2024    AST 43 (H) 12/31/2024    ALT 26 12/31/2024     Lab Results   Component Value Date    MG 1.7 12/10/2021    PHOS 3.4 12/10/2021    FREET4 1.74 06/30/2020    TSH 0.256 (L) 12/06/2021     Lab Results   Component Value Date    IRON 15 (L) 12/08/2021    LABIRON 7 (L) 12/08/2021    TRANSFERRIN 150 (L) 12/08/2021    TIBC 224 (L) 12/08/2021     Lab Results   Component Value Date    FERRITIN 75 12/21/2020    [id number] 1,022 (H) 12/08/2021    FOLATE 10.90 12/08/2021     Lab Results   Component Value Date     242.0 (H) 12/03/2024             Assessment and Plan    Diagnoses and all orders for this visit:    1. Malignant neoplasm of head of pancreas (Primary)  Assessment & Plan:  Patient is on neoadjuvant treatment with FOLFIRINOX.  He has some mild nausea adequately controlled with his antiemetics.  Also has neuropathy but stable.  Lab work is adequate for treatment.  Proceed with cycle 11 planned.  I will see him back for cycle 8-day one of her prior to monitor for disease.  He will have surgical reevaluation after cycle 8.    Orders:  -     ondansetron (ZOFRAN) 8 MG tablet; Take 1 tablet by mouth 3 (Three) Times a Day As Needed for Nausea or Vomiting.  Dispense: 30 tablet; Refill: 5  -     Infusion Appointment Request 10; Future    Other orders  -     Cancel: dextrose (D5W) 5 % infusion  -     Cancel: palonosetron (ALOXI) injection 0.25 mg  -     Cancel: dexAMETHasone (DECADRON) 12 mg in sodium chloride 0.9 % IVPB  -     Cancel: OXALIplatin (ELOXATIN) 140 mg in dextrose (D5W) 5 % 278 mL chemo IVPB  -     Cancel: leucovorin 660 mg in dextrose (D5W) 5 % 316 mL IVPB  -     Cancel: irinotecan (CAMPTOSAR) 205 mg in dextrose (D5W) 5 % 510.3 mL chemo IVPB  -     Cancel: atropine injection 0.25 mg  -      Cancel: fluorouracil (ADRUCIL) 3,930 mg in sodium chloride 0.9 % 78.6 mL chemo infusion - FOR HOME USE  -     Cancel: Hydrocortisone Sod Suc (PF) (Solu-CORTEF) injection 100 mg  -     Cancel: diphenhydrAMINE (BENADRYL) injection 50 mg  -     Cancel: famotidine (PEPCID) injection 20 mg  -     pegfilgrastim (NEULASTA ONPRO) on-body injector 6 mg            Patient Follow Up: Cycle 8 day 1    Patient was given instructions and counseling regarding his condition or for health maintenance advice. Please see specific information pulled into the AVS if appropriate.     Gene Blanchard MD    1/1/2025

## 2025-01-01 NOTE — ASSESSMENT & PLAN NOTE
Patient is on neoadjuvant treatment with FOLFIRINOX.  He has some mild nausea adequately controlled with his antiemetics.  Also has neuropathy but stable.  Lab work is adequate for treatment.  Proceed with cycle 11 planned.  I will see him back for cycle 8-day one of her prior to monitor for disease.  He will have surgical reevaluation after cycle 8.

## 2025-01-02 ENCOUNTER — HOSPITAL ENCOUNTER (OUTPATIENT)
Dept: ONCOLOGY | Facility: HOSPITAL | Age: 70
Discharge: HOME | End: 2025-01-02
Admitting: INTERNAL MEDICINE
Payer: MEDICARE

## 2025-01-02 VITALS
RESPIRATION RATE: 16 BRPM | TEMPERATURE: 97.6 F | DIASTOLIC BLOOD PRESSURE: 75 MMHG | HEART RATE: 57 BPM | OXYGEN SATURATION: 97 % | SYSTOLIC BLOOD PRESSURE: 121 MMHG

## 2025-01-02 DIAGNOSIS — C25.0 MALIGNANT NEOPLASM OF HEAD OF PANCREAS: Primary | ICD-10-CM

## 2025-01-02 PROCEDURE — 25010000002 HEPARIN LOCK FLUSH PER 10 UNITS: Performed by: INTERNAL MEDICINE

## 2025-01-02 PROCEDURE — 25010000002 PEGFILGRASTIM 6 MG/0.6ML PREFILLED SYRINGE KIT: Performed by: INTERNAL MEDICINE

## 2025-01-02 PROCEDURE — 96377 APPLICATON ON-BODY INJECTOR: CPT

## 2025-01-02 RX ORDER — SODIUM CHLORIDE 0.9 % (FLUSH) 0.9 %
20 SYRINGE (ML) INJECTION AS NEEDED
Status: DISCONTINUED | OUTPATIENT
Start: 2025-01-02 | End: 2025-01-03 | Stop reason: HOSPADM

## 2025-01-02 RX ORDER — HEPARIN SODIUM (PORCINE) LOCK FLUSH IV SOLN 100 UNIT/ML 100 UNIT/ML
500 SOLUTION INTRAVENOUS AS NEEDED
Status: DISCONTINUED | OUTPATIENT
Start: 2025-01-02 | End: 2025-01-03 | Stop reason: HOSPADM

## 2025-01-02 RX ORDER — HEPARIN SODIUM (PORCINE) LOCK FLUSH IV SOLN 100 UNIT/ML 100 UNIT/ML
500 SOLUTION INTRAVENOUS AS NEEDED
OUTPATIENT
Start: 2025-01-02

## 2025-01-02 RX ORDER — SODIUM CHLORIDE 0.9 % (FLUSH) 0.9 %
20 SYRINGE (ML) INJECTION AS NEEDED
OUTPATIENT
Start: 2025-01-02

## 2025-01-02 RX ADMIN — PEGFILGRASTIM 6 MG: KIT SUBCUTANEOUS at 13:44

## 2025-01-02 RX ADMIN — Medication 20 ML: at 13:42

## 2025-01-02 RX ADMIN — HEPARIN 500 UNITS: 100 SYRINGE at 13:42

## 2025-01-07 ENCOUNTER — TELEPHONE (OUTPATIENT)
Dept: ONCOLOGY | Facility: HOSPITAL | Age: 70
End: 2025-01-07
Payer: MEDICARE

## 2025-01-07 NOTE — TELEPHONE ENCOUNTER
OSW received a phone call from Mr. Stern this morning inquiring if Arnoldos Freddy is able to assist with his dental bill. Advised Mr. Stern that Anastasia Hayes has been trying to reach the dental office to pay this bill on his behalf without success. Mr. Stern will plan to stop by the dental office and provide them with the telephone number to reach Nicolle at [employer] to receive payment. Encouraged OSW support remains available.

## 2025-01-09 NOTE — TELEPHONE ENCOUNTER
Received a return call from Mr. Stern. Relayed the above. He v/u and expressed appreciation. Encouraged OSW support remains available.

## 2025-01-09 NOTE — TELEPHONE ENCOUNTER
Attempted to reach Mr. Stern this morning to notify him that [employer] has paid [employer] $268. Unable to leave Mr. Stern a VM due to not having a VM box set up at this time. OSW support remains available.

## 2025-01-14 ENCOUNTER — HOSPITAL ENCOUNTER (OUTPATIENT)
Dept: ONCOLOGY | Facility: HOSPITAL | Age: 70
Discharge: HOME | End: 2025-01-14
Payer: MEDICARE

## 2025-01-14 ENCOUNTER — OFFICE VISIT (OUTPATIENT)
Dept: ONCOLOGY | Facility: HOSPITAL | Age: 70
End: 2025-01-14
Payer: MEDICARE

## 2025-01-14 ENCOUNTER — DOCUMENTATION (OUTPATIENT)
Dept: ONCOLOGY | Facility: HOSPITAL | Age: 70
End: 2025-01-14
Payer: MEDICARE

## 2025-01-14 VITALS — DIASTOLIC BLOOD PRESSURE: 80 MMHG | SYSTOLIC BLOOD PRESSURE: 146 MMHG | HEART RATE: 60 BPM

## 2025-01-14 VITALS
RESPIRATION RATE: 18 BRPM | DIASTOLIC BLOOD PRESSURE: 80 MMHG | OXYGEN SATURATION: 96 % | WEIGHT: 147 LBS | SYSTOLIC BLOOD PRESSURE: 146 MMHG | HEART RATE: 60 BPM | TEMPERATURE: 97.2 F | BODY MASS INDEX: 23.73 KG/M2

## 2025-01-14 DIAGNOSIS — C25.0 MALIGNANT NEOPLASM OF HEAD OF PANCREAS: Primary | ICD-10-CM

## 2025-01-14 PROBLEM — T45.1X5A ANEMIA DUE TO CHEMOTHERAPY: Status: ACTIVE | Noted: 2025-01-14

## 2025-01-14 PROBLEM — D64.81 ANEMIA DUE TO CHEMOTHERAPY: Status: ACTIVE | Noted: 2025-01-14

## 2025-01-14 LAB
ALBUMIN SERPL-MCNC: 3.6 G/DL (ref 3.5–5.2)
ALBUMIN/GLOB SERPL: 1.4 G/DL
ALP SERPL-CCNC: 246 U/L (ref 39–117)
ALT SERPL W P-5'-P-CCNC: 19 U/L (ref 1–41)
ANION GAP SERPL CALCULATED.3IONS-SCNC: 9.4 MMOL/L (ref 5–15)
AST SERPL-CCNC: 31 U/L (ref 1–40)
BASOPHILS # BLD AUTO: 0.03 10*3/MM3 (ref 0–0.2)
BASOPHILS NFR BLD AUTO: 0.4 % (ref 0–1.5)
BILIRUB SERPL-MCNC: 0.3 MG/DL (ref 0–1.2)
BUN SERPL-MCNC: 9 MG/DL (ref 8–23)
BUN/CREAT SERPL: 10.8 (ref 7–25)
CALCIUM SPEC-SCNC: 8.3 MG/DL (ref 8.6–10.5)
CANCER AG19-9 SERPL-ACNC: 168 U/ML
CHLORIDE SERPL-SCNC: 109 MMOL/L (ref 98–107)
CO2 SERPL-SCNC: 22.6 MMOL/L (ref 22–29)
CREAT SERPL-MCNC: 0.83 MG/DL (ref 0.76–1.27)
DEPRECATED RDW RBC AUTO: 63.7 FL (ref 37–54)
EGFRCR SERPLBLD CKD-EPI 2021: 94.2 ML/MIN/1.73
EOSINOPHIL # BLD AUTO: 0.05 10*3/MM3 (ref 0–0.4)
EOSINOPHIL NFR BLD AUTO: 0.7 % (ref 0.3–6.2)
ERYTHROCYTE [DISTWIDTH] IN BLOOD BY AUTOMATED COUNT: 18.8 % (ref 12.3–15.4)
GLOBULIN UR ELPH-MCNC: 2.6 GM/DL
GLUCOSE SERPL-MCNC: 127 MG/DL (ref 65–99)
HCT VFR BLD AUTO: 33.9 % (ref 37.5–51)
HGB BLD-MCNC: 10.2 G/DL (ref 13–17.7)
IMM GRANULOCYTES # BLD AUTO: 0.1 10*3/MM3 (ref 0–0.05)
IMM GRANULOCYTES NFR BLD AUTO: 1.3 % (ref 0–0.5)
LYMPHOCYTES # BLD AUTO: 1.89 10*3/MM3 (ref 0.7–3.1)
LYMPHOCYTES NFR BLD AUTO: 25.4 % (ref 19.6–45.3)
MCH RBC QN AUTO: 27.9 PG (ref 26.6–33)
MCHC RBC AUTO-ENTMCNC: 30.1 G/DL (ref 31.5–35.7)
MCV RBC AUTO: 92.6 FL (ref 79–97)
MONOCYTES # BLD AUTO: 1.11 10*3/MM3 (ref 0.1–0.9)
MONOCYTES NFR BLD AUTO: 14.9 % (ref 5–12)
NEUTROPHILS NFR BLD AUTO: 4.27 10*3/MM3 (ref 1.7–7)
NEUTROPHILS NFR BLD AUTO: 57.3 % (ref 42.7–76)
NRBC BLD AUTO-RTO: 0 /100 WBC (ref 0–0.2)
PLATELET # BLD AUTO: 138 10*3/MM3 (ref 140–450)
PMV BLD AUTO: 11.3 FL (ref 6–12)
POTASSIUM SERPL-SCNC: 3.6 MMOL/L (ref 3.5–5.2)
PROT SERPL-MCNC: 6.2 G/DL (ref 6–8.5)
RBC # BLD AUTO: 3.66 10*6/MM3 (ref 4.14–5.8)
SODIUM SERPL-SCNC: 141 MMOL/L (ref 136–145)
WBC NRBC COR # BLD AUTO: 7.45 10*3/MM3 (ref 3.4–10.8)

## 2025-01-14 PROCEDURE — 85025 COMPLETE CBC W/AUTO DIFF WBC: CPT | Performed by: INTERNAL MEDICINE

## 2025-01-14 PROCEDURE — 96368 THER/DIAG CONCURRENT INF: CPT

## 2025-01-14 PROCEDURE — 25010000002 FLUOROURACIL PER 500 MG: Performed by: INTERNAL MEDICINE

## 2025-01-14 PROCEDURE — 96417 CHEMO IV INFUS EACH ADDL SEQ: CPT

## 2025-01-14 PROCEDURE — 25010000003 DEXTROSE 5 % SOLUTION 500 ML FLEX CONT: Performed by: INTERNAL MEDICINE

## 2025-01-14 PROCEDURE — 25010000002 LEUCOVORIN CALCIUM PER 50 MG: Performed by: INTERNAL MEDICINE

## 2025-01-14 PROCEDURE — 25010000002 PALONOSETRON PER 25 MCG: Performed by: INTERNAL MEDICINE

## 2025-01-14 PROCEDURE — 96375 TX/PRO/DX INJ NEW DRUG ADDON: CPT

## 2025-01-14 PROCEDURE — 25010000002 DEXAMETHASONE SODIUM PHOSPHATE 120 MG/30ML SOLUTION: Performed by: INTERNAL MEDICINE

## 2025-01-14 PROCEDURE — 25010000003 DEXTROSE 5 % SOLUTION: Performed by: INTERNAL MEDICINE

## 2025-01-14 PROCEDURE — 96413 CHEMO IV INFUSION 1 HR: CPT

## 2025-01-14 PROCEDURE — 25010000003 DEXTROSE 5 % SOLUTION 250 ML FLEX CONT: Performed by: INTERNAL MEDICINE

## 2025-01-14 PROCEDURE — 96366 THER/PROPH/DIAG IV INF ADDON: CPT

## 2025-01-14 PROCEDURE — 96415 CHEMO IV INFUSION ADDL HR: CPT

## 2025-01-14 PROCEDURE — 86301 IMMUNOASSAY TUMOR CA 19-9: CPT | Performed by: INTERNAL MEDICINE

## 2025-01-14 PROCEDURE — 80053 COMPREHEN METABOLIC PANEL: CPT | Performed by: INTERNAL MEDICINE

## 2025-01-14 PROCEDURE — 25010000002 OXALIPLATIN PER 0.5 MG: Performed by: INTERNAL MEDICINE

## 2025-01-14 PROCEDURE — 25010000002 IRINOTECAN PER 20 MG: Performed by: INTERNAL MEDICINE

## 2025-01-14 PROCEDURE — G0498 CHEMO EXTEND IV INFUS W/PUMP: HCPCS

## 2025-01-14 RX ORDER — HYDROCORTISONE SODIUM SUCCINATE 100 MG/2ML
100 INJECTION INTRAMUSCULAR; INTRAVENOUS AS NEEDED
Status: DISCONTINUED | OUTPATIENT
Start: 2025-01-14 | End: 2025-01-15 | Stop reason: HOSPADM

## 2025-01-14 RX ORDER — HYDROCORTISONE SODIUM SUCCINATE 100 MG/2ML
100 INJECTION INTRAMUSCULAR; INTRAVENOUS AS NEEDED
Status: CANCELLED | OUTPATIENT
Start: 2025-01-14

## 2025-01-14 RX ORDER — DEXTROSE MONOHYDRATE 50 MG/ML
20 INJECTION, SOLUTION INTRAVENOUS ONCE
Status: COMPLETED | OUTPATIENT
Start: 2025-01-14 | End: 2025-01-14

## 2025-01-14 RX ORDER — DIPHENHYDRAMINE HYDROCHLORIDE 50 MG/ML
50 INJECTION INTRAMUSCULAR; INTRAVENOUS AS NEEDED
Status: DISCONTINUED | OUTPATIENT
Start: 2025-01-14 | End: 2025-01-15 | Stop reason: HOSPADM

## 2025-01-14 RX ORDER — DIPHENHYDRAMINE HYDROCHLORIDE 50 MG/ML
50 INJECTION INTRAMUSCULAR; INTRAVENOUS AS NEEDED
Status: CANCELLED | OUTPATIENT
Start: 2025-01-14

## 2025-01-14 RX ORDER — FAMOTIDINE 10 MG/ML
20 INJECTION, SOLUTION INTRAVENOUS AS NEEDED
Status: DISCONTINUED | OUTPATIENT
Start: 2025-01-14 | End: 2025-01-15 | Stop reason: HOSPADM

## 2025-01-14 RX ORDER — PALONOSETRON 0.05 MG/ML
0.25 INJECTION, SOLUTION INTRAVENOUS ONCE
Status: CANCELLED | OUTPATIENT
Start: 2025-01-14

## 2025-01-14 RX ORDER — FAMOTIDINE 10 MG/ML
20 INJECTION, SOLUTION INTRAVENOUS AS NEEDED
Status: CANCELLED | OUTPATIENT
Start: 2025-01-14

## 2025-01-14 RX ORDER — DEXTROSE MONOHYDRATE 50 MG/ML
20 INJECTION, SOLUTION INTRAVENOUS ONCE
Status: CANCELLED | OUTPATIENT
Start: 2025-01-14

## 2025-01-14 RX ORDER — PALONOSETRON 0.05 MG/ML
0.25 INJECTION, SOLUTION INTRAVENOUS ONCE
Status: COMPLETED | OUTPATIENT
Start: 2025-01-14 | End: 2025-01-14

## 2025-01-14 RX ORDER — ATROPINE SULFATE 1 MG/ML
0.25 INJECTION, SOLUTION INTRAMUSCULAR; INTRAVENOUS; SUBCUTANEOUS
Status: CANCELLED | OUTPATIENT
Start: 2025-01-14

## 2025-01-14 RX ADMIN — PALONOSETRON HYDROCHLORIDE 0.25 MG: 0.25 INJECTION INTRAVENOUS at 09:09

## 2025-01-14 RX ADMIN — OXALIPLATIN 140 MG: 5 INJECTION, SOLUTION INTRAVENOUS at 09:54

## 2025-01-14 RX ADMIN — FLUOROURACIL 3900 MG: 50 INJECTION, SOLUTION INTRAVENOUS at 14:06

## 2025-01-14 RX ADMIN — DEXAMETHASONE SODIUM PHOSPHATE 12 MG: 4 INJECTION, SOLUTION INTRA-ARTICULAR; INTRALESIONAL; INTRAMUSCULAR; INTRAVENOUS; SOFT TISSUE at 09:07

## 2025-01-14 RX ADMIN — ATROPINE SULFATE 0.25 MG: 0.4 INJECTION, SOLUTION INTRAVENOUS at 11:59

## 2025-01-14 RX ADMIN — LEUCOVORIN CALCIUM 660 MG: 350 INJECTION, POWDER, LYOPHILIZED, FOR SUSPENSION INTRAMUSCULAR; INTRAVENOUS at 12:00

## 2025-01-14 RX ADMIN — DEXTROSE MONOHYDRATE 20 ML/HR: 50 INJECTION, SOLUTION INTRAVENOUS at 09:06

## 2025-01-14 RX ADMIN — IRINOTECAN HYDROCHLORIDE 205 MG: 20 INJECTION, SOLUTION INTRAVENOUS at 12:32

## 2025-01-14 NOTE — PROGRESS NOTES
OSW received a notification from the medical oncology infusion nurse, Bonnie MACE, that Mr. Stern is requesting to speak with OSW today. OSW met with Mr. Stern in the medical oncology infusion center this morning. He reports his apartment has been bought out by a new owner and therefore they were unable to deposit the recent rent check received from [employer] or [employer]. Therefore, this check needs voided and written to the owner. Mr. Stern requested OSW to contact Didier with his landlord's office at [phone]. OSW attempted to reach this number and left a VM with my direct number where I may be reached back at to further facilitate. OSW support remains available.    OSW received a call from Mr. Stern this afternoon requesting a status update. Made him aware that I left a VM with [facility] earlier this morning and waiting to receive a call back. Offered for Mr. Stern to provide his landlord's office with my direct number to reach me at to further facilitate his assistance.    Later this afternoon, OSW received a call from [facility] with [facility] [employer]. He advised they will not be charging Mr. Stern any late fees due to the issue. His rent amount remains the same at $525/month and they are honoring his old lease with [employer]. [facility] confirmed the check was received from [employer] and if they're able to issue a new one, this may be mailed to Olga Badillo in [place]. OSW relayed the above to [employer]. Awaiting response. OSW support remains available.    [employer] advised they can get a new check issued to [facility] [employer], however, requesting the previous check to please be mailed back to them. They will also need a letter with the following information:     Patient Name  Patient Address  Amount Due Monthly  Outstanding Balance (if any)  [facility] mailing address for payments  [facility] phone number    OSW has discussed this with Mr. Stern and his   and working on getting these tasks completed. OSW support remains available.     Update 1/16/25: OSW received the requested information from  Stern's  and forwarded this to [employer] at [email or web address]. Mr. Stern also provided the original check to OSW this afternoon to mail back to [employer].

## 2025-01-14 NOTE — ASSESSMENT & PLAN NOTE
Patient is on neoadjuvant FOLFIRINOX.  He is due for cycle 8.  He does have some fatigue, mild nausea and neuropathy from his regimen but he is able to mitigate those symptoms.  Blood counts today are adequate for treatment.  Proceed with cycle 8 as planned.  I will check CA 19-9 today.  He will be referred back to his surgical oncologist for consideration of resection.  I will order CT chest, abdomen, pelvis to reassess his disease.  I will see him back in 1 month to ensure that all acute toxicities are resolving with lab work prior.  Port flush routinely when not in active use.

## 2025-01-14 NOTE — PROGRESS NOTES
Chief Complaint  Malignant neoplasm of head of pancreas    Forrest Duong MD Smith, Forrest Leon MD    Subjective          Josue Stern presents to [facility] for ongoing treatment of his pancreatic cancer.  He is on neoadjuvant FOLFIRINOX.  He is due for cycle 8.  He notes some mild nausea, decreased appetite but his weight is maintained.  He also has neuropathy but stable.  No issues from his Port-A-Cath.  He denies new masses or adenopathy.    Oncology/Hematology History   Malignant neoplasm of head of pancreas   9/5/2024 Initial Diagnosis    Malignant neoplasm of pancreas     9/5/2024 -  Chemotherapy    OP CENTRAL VENOUS ACCESS DEVICE Access, Care, and Maintenance (CVAD)     9/17/2024 -  Chemotherapy    OP PANCREATIC mFOLFIRINOX (OXALIplatin / Leucovorin / Irinotecan / Fluorouracil CIV)           Current Outpatient Medications on File Prior to Visit   Medication Sig Dispense Refill    albuterol sulfate  (90 Base) MCG/ACT inhaler Inhale 2 puffs Every 4 (Four) Hours As Needed for Wheezing. 18 g 0    amLODIPine (Norvasc) 5 MG tablet Take 1 tablet by mouth Daily.      amoxicillin (AMOXIL) 500 MG capsule Take 1 capsule by mouth 3 times a day.      azithromycin (Zithromax Z-Tomasz) 250 MG tablet Take 2 tablets by mouth on day 1, then 1 tablet daily on days 2-5 6 tablet 0    buprenorphine-naloxone (Suboxone) 8-2 MG film film Place 1 film under the tongue 2 (Two) Times a Day.      busPIRone (BUSPAR) 15 MG tablet Take 1 tablet by mouth 3 (Three) Times a Day.      cetirizine (ZyrTEC Allergy) 10 MG tablet Take 1 tablet by mouth Daily.      DULoxetine (CYMBALTA) 30 MG capsule TAKE 1 CAPSULE BY MOUTH DAILY 30 capsule 1    gabapentin (NEURONTIN) 800 MG tablet Take 1 tablet by mouth 3 (Three) Times a Day.      ibuprofen (ADVIL,MOTRIN) 800 MG tablet Take 1 tablet by mouth Every 8 (Eight) Hours As Needed for Mild Pain. 30 tablet 1    lidocaine-prilocaine (EMLA) 2.5-2.5  % cream Apply 1 Application topically to the appropriate area as directed As Needed for Injection Site Pain. 30 g 1    ondansetron (ZOFRAN) 8 MG tablet Take 1 tablet by mouth 3 (Three) Times a Day As Needed for Nausea or Vomiting. 30 tablet 5    prazosin (MINIPRESS) 1 MG capsule Take 1 capsule by mouth Every Night.      predniSONE (DELTASONE) 50 MG tablet Take 1 tablet by mouth Daily. 5 tablet 0    propranolol LA (Inderal LA) 60 MG 24 hr capsule Take 1 capsule by mouth Daily.       Current Facility-Administered Medications on File Prior to Visit   Medication Dose Route Frequency Provider Last Rate Last Admin    atropine sulfate injection 0.25 mg  0.25 mg Intravenous Q15 Min PRN Gene Blanchard MD   0.25 mg at 01/14/25 1159    [COMPLETED] dexAMETHasone (DECADRON) IVPB 12 mg  12 mg Intravenous Once Gene Blanchard MD   Stopped at 01/14/25 0917    [COMPLETED] dextrose (D5W) 5 % infusion  20 mL/hr Intravenous Once Gene Blanchard MD   Stopped at 01/14/25 1403    diphenhydrAMINE (BENADRYL) injection 50 mg  50 mg Intravenous PRN Gene Blanchard MD        famotidine (PEPCID) injection 20 mg  20 mg Intravenous PRN Gene Blanchard MD        fluorouracil (ADRUCIL) 3,900 mg in sodium chloride 0.9 % 138 mL chemo infusion - FOR HOME USE  3,900 mg Intravenous Once Gene Blanchard MD   3,900 mg at 01/14/25 1406    Hydrocortisone Sod Suc (PF) (Solu-CORTEF) injection 100 mg  100 mg Intravenous PRN Gene Blanchard MD        [COMPLETED] irinotecan (CAMPTOSAR) 205 mg in dextrose (D5W) 5 % 560.3 mL chemo IVPB  112.5 mg/m2 (Treatment Plan Recorded) Intravenous Once Gene Blanchard MD   Stopped at 01/14/25 1402    [COMPLETED] leucovorin 660 mg in dextrose (D5W) 5 % 308 mL IVPB  360 mg/m2 (Treatment Plan Recorded) Intravenous Once Gene Blanchard MD   Stopped at 01/14/25 1400    [COMPLETED] OXALIplatin (ELOXATIN) 140 mg in dextrose (D5W) 5 % 303 mL chemo IVPB  76.5 mg/m2 (Treatment Plan Recorded) Intravenous Once Lo,  Gene KONG MD   Stopped at 01/14/25 1154    [COMPLETED] palonosetron (ALOXI) injection 0.25 mg  0.25 mg Intravenous Once Gene Blanchard MD   0.25 mg at 01/14/25 0909       Allergies   Allergen Reactions    Aspirin Nausea Only     Past Medical History:   Diagnosis Date    Allergies     Asthma     GERD (gastroesophageal reflux disease)     Hypertension     Malignant neoplasm of pancreas 09/05/2024     Past Surgical History:   Procedure Laterality Date    HERNIA REPAIR       Social History     Socioeconomic History    Marital status: Single   Tobacco Use    Smoking status: Every Day     Current packs/day: 0.50     Types: Cigarettes    Smokeless tobacco: Never   Vaping Use    Vaping status: Every Day   Substance and Sexual Activity    Alcohol use: Never    Drug use: Never    Sexual activity: Defer     History reviewed. No pertinent family history.    Objective   Physical Exam  Vitals reviewed. Exam conducted with a chaperone present.   Cardiovascular:      Rate and Rhythm: Normal rate and regular rhythm.      Heart sounds: Normal heart sounds. No murmur heard.     No gallop.   Pulmonary:      Effort: Pulmonary effort is normal.      Breath sounds: Normal breath sounds.      Comments: Port-A-Cath  Abdominal:      General: Bowel sounds are normal.   Musculoskeletal:      Right lower leg: No edema.      Left lower leg: No edema.   Lymphadenopathy:      Cervical: No cervical adenopathy.   Psychiatric:         Mood and Affect: Mood normal.         Behavior: Behavior normal.         Vitals:    01/14/25 0826   BP: 146/80   Pulse: 60     ECOG score: 0         PHQ-9 Total Score:                    Result Review :   The following data was reviewed by: Gene Blanchard MD on 01/14/2025:  Lab Results   Component Value Date    HGB 10.2 (L) 01/14/2025    HCT 33.9 (L) 01/14/2025    MCV 92.6 01/14/2025     (L) 01/14/2025    WBC 7.45 01/14/2025    NEUTROABS 4.27 01/14/2025    LYMPHSABS 1.89 01/14/2025    MONOSABS 1.11 (H)  01/14/2025    EOSABS 0.05 01/14/2025    BASOSABS 0.03 01/14/2025     Lab Results   Component Value Date    GLUCOSE 127 (H) 01/14/2025    BUN 9 01/14/2025    CREATININE 0.83 01/14/2025     01/14/2025    K 3.6 01/14/2025     (H) 01/14/2025    CO2 22.6 01/14/2025    CALCIUM 8.3 (L) 01/14/2025    PROTEINTOT 6.2 01/14/2025    ALBUMIN 3.6 01/14/2025    BILITOT 0.3 01/14/2025    ALKPHOS 246 (H) 01/14/2025    AST 31 01/14/2025    ALT 19 01/14/2025     Lab Results   Component Value Date    MG 1.7 12/10/2021    PHOS 3.4 12/10/2021    FREET4 1.74 06/30/2020    TSH 0.256 (L) 12/06/2021     Lab Results   Component Value Date    IRON 15 (L) 12/08/2021    LABIRON 7 (L) 12/08/2021    TRANSFERRIN 150 (L) 12/08/2021    TIBC 224 (L) 12/08/2021     Lab Results   Component Value Date    FERRITIN 75 12/21/2020    KVHQCCZE28 1,022 (H) 12/08/2021    FOLATE 10.90 12/08/2021     Lab Results   Component Value Date     168.0 (H) 01/14/2025             Assessment and Plan    Diagnoses and all orders for this visit:    1. Malignant neoplasm of head of pancreas (Primary)  Assessment & Plan:  Patient is on neoadjuvant FOLFIRINOX.  He is due for cycle 8.  He does have some fatigue, mild nausea and neuropathy from his regimen but he is able to mitigate those symptoms.  Blood counts today are adequate for treatment.  Proceed with cycle 8 as planned.  I will check CA 19-9 today.  He will be referred back to his surgical oncologist for consideration of resection.  I will order CT chest, abdomen, pelvis to reassess his disease.  I will see him back in 1 month to ensure that all acute toxicities are resolving with lab work prior.  Port flush routinely when not in active use.    Orders:  -     Cancer Antigen 19-9; Future  -     CT chest w contrast; Future  -     CT abdomen pelvis w contrast; Future  -     Cancel: Ambulatory Referral to General Surgery  -     CBC & Differential; Future  -     Comprehensive Metabolic Panel; Future  -      Ambulatory Referral to Surgical Oncology  -     Infusion Appointment Request 10; Future    Other orders  -     Cancel: dextrose (D5W) 5 % infusion  -     Cancel: palonosetron (ALOXI) injection 0.25 mg  -     Cancel: dexAMETHasone (DECADRON) 12 mg in sodium chloride 0.9 % IVPB  -     Cancel: OXALIplatin (ELOXATIN) 140 mg in dextrose (D5W) 5 % 278 mL chemo IVPB  -     Cancel: leucovorin 660 mg in dextrose (D5W) 5 % 316 mL IVPB  -     Cancel: irinotecan (CAMPTOSAR) 205 mg in dextrose (D5W) 5 % 510.3 mL chemo IVPB  -     Cancel: atropine injection 0.25 mg  -     Cancel: fluorouracil (ADRUCIL) 3,930 mg in sodium chloride 0.9 % 78.6 mL chemo infusion - FOR HOME USE  -     Cancel: Hydrocortisone Sod Suc (PF) (Solu-CORTEF) injection 100 mg  -     Cancel: diphenhydrAMINE (BENADRYL) injection 50 mg  -     Cancel: famotidine (PEPCID) injection 20 mg  -     pegfilgrastim (NEULASTA ONPRO) on-body injector 6 mg            Patient Follow Up: 1 month    Patient was given instructions and counseling regarding his condition or for health maintenance advice. Please see specific information pulled into the AVS if appropriate.     Gene Blanchard MD    1/14/2025

## 2025-01-16 ENCOUNTER — HOSPITAL ENCOUNTER (OUTPATIENT)
Dept: ONCOLOGY | Facility: HOSPITAL | Age: 70
Discharge: HOME | End: 2025-01-16
Admitting: INTERNAL MEDICINE
Payer: MEDICARE

## 2025-01-16 DIAGNOSIS — C25.0 MALIGNANT NEOPLASM OF HEAD OF PANCREAS: Primary | ICD-10-CM

## 2025-01-16 PROCEDURE — 25010000002 PEGFILGRASTIM 6 MG/0.6ML PREFILLED SYRINGE KIT: Performed by: INTERNAL MEDICINE

## 2025-01-16 PROCEDURE — 96377 APPLICATON ON-BODY INJECTOR: CPT

## 2025-01-16 PROCEDURE — 25010000002 HEPARIN LOCK FLUSH PER 10 UNITS: Performed by: INTERNAL MEDICINE

## 2025-01-16 RX ORDER — HEPARIN SODIUM (PORCINE) LOCK FLUSH IV SOLN 100 UNIT/ML 100 UNIT/ML
500 SOLUTION INTRAVENOUS AS NEEDED
Status: DISCONTINUED | OUTPATIENT
Start: 2025-01-16 | End: 2025-01-17 | Stop reason: HOSPADM

## 2025-01-16 RX ORDER — SODIUM CHLORIDE 0.9 % (FLUSH) 0.9 %
20 SYRINGE (ML) INJECTION AS NEEDED
OUTPATIENT
Start: 2025-01-16

## 2025-01-16 RX ORDER — HEPARIN SODIUM (PORCINE) LOCK FLUSH IV SOLN 100 UNIT/ML 100 UNIT/ML
500 SOLUTION INTRAVENOUS AS NEEDED
OUTPATIENT
Start: 2025-01-16

## 2025-01-16 RX ORDER — SODIUM CHLORIDE 0.9 % (FLUSH) 0.9 %
20 SYRINGE (ML) INJECTION AS NEEDED
Status: DISCONTINUED | OUTPATIENT
Start: 2025-01-16 | End: 2025-01-17 | Stop reason: HOSPADM

## 2025-01-16 RX ADMIN — PEGFILGRASTIM 6 MG: KIT SUBCUTANEOUS at 14:36

## 2025-01-16 RX ADMIN — HEPARIN 500 UNITS: 100 SYRINGE at 14:31

## 2025-01-16 RX ADMIN — Medication 20 ML: at 14:31

## 2025-01-28 ENCOUNTER — HOSPITAL ENCOUNTER (OUTPATIENT)
Dept: CT IMAGING | Facility: HOSPITAL | Age: 70
Discharge: HOME | End: 2025-01-28
Admitting: INTERNAL MEDICINE
Payer: MEDICARE

## 2025-01-28 ENCOUNTER — TELEPHONE (OUTPATIENT)
Dept: ONCOLOGY | Facility: HOSPITAL | Age: 70
End: 2025-01-28
Payer: MEDICARE

## 2025-01-28 DIAGNOSIS — C25.0 MALIGNANT NEOPLASM OF HEAD OF PANCREAS: ICD-10-CM

## 2025-01-28 PROCEDURE — 71260 CT THORAX DX C+: CPT

## 2025-01-28 PROCEDURE — 25510000001 IOPAMIDOL PER 1 ML: Performed by: INTERNAL MEDICINE

## 2025-01-28 PROCEDURE — 74177 CT ABD & PELVIS W/CONTRAST: CPT

## 2025-01-28 RX ORDER — HEPARIN SODIUM (PORCINE) LOCK FLUSH IV SOLN 100 UNIT/ML 100 UNIT/ML
SOLUTION INTRAVENOUS
Status: DISCONTINUED
Start: 2025-01-28 | End: 2025-01-29 | Stop reason: HOSPADM

## 2025-01-28 RX ORDER — IOPAMIDOL 755 MG/ML
100 INJECTION, SOLUTION INTRAVASCULAR
Status: COMPLETED | OUTPATIENT
Start: 2025-01-28 | End: 2025-01-28

## 2025-01-28 RX ADMIN — IOPAMIDOL 90 ML: 755 INJECTION, SOLUTION INTRAVENOUS at 13:25

## 2025-01-28 NOTE — TELEPHONE ENCOUNTER
OSW received a response from Gracie at [employer] [facility], advising that they are waiting for the original check to be returned to them. They spoke with  Stern and he advised it's already been placed in the mail. Confirmed this was mailed out by myself back on 1/16/25 and hopefully will arrive shortly. OSW support remains available.

## 2025-01-28 NOTE — TELEPHONE ENCOUNTER
OSW received a VM from Mr. Stern requesting a status update on the rent check that his landlord is anticipating to receive from [employer]. OSW requested Gensis at [employer] to please provide us with a status update. Awaiting response.

## 2025-01-29 NOTE — TELEPHONE ENCOUNTER
OSW received a phone call from Mr. Stern this afternoon to [facility]. OSW relayed the above to him. OSW also provided a status update to his . OSW support remains available.

## 2025-02-01 DIAGNOSIS — G63 NEUROPATHY ASSOCIATED WITH CANCER: ICD-10-CM

## 2025-02-01 DIAGNOSIS — C80.1 NEUROPATHY ASSOCIATED WITH CANCER: ICD-10-CM

## 2025-02-03 RX ORDER — DULOXETIN HYDROCHLORIDE 30 MG/1
30 CAPSULE, DELAYED RELEASE ORAL DAILY
Qty: 30 CAPSULE | Refills: 1 | Status: SHIPPED | OUTPATIENT
Start: 2025-02-03

## 2025-02-04 ENCOUNTER — TELEPHONE (OUTPATIENT)
Dept: ONCOLOGY | Facility: HOSPITAL | Age: 70
End: 2025-02-04
Payer: MEDICARE

## 2025-02-04 NOTE — TELEPHONE ENCOUNTER
Received a response from [employer] that the check arrived today and they were able to issue a new check today. They've requested we please allow 7-10 business days for the check to arrive. [employer] provided a receipt and letter for the landlord confirming the gavin. OSW forwarded this to Mr. Stern's , Didier. OSW contacted Mr. Stern to make him aware. He v/u and expressed relief and gratitude. Encouraged OSW support remains available.

## 2025-02-04 NOTE — TELEPHONE ENCOUNTER
OSW has not heard back from [employer]. Sent Gracie and Terese both a message requesting a status update sometime today if at all possible, as Mr. Stern is expecting a return call from me today. Additionally, requested they please produce a letter for us to provide to the [facility] in the interim. OSW support remains available.

## 2025-02-04 NOTE — TELEPHONE ENCOUNTER
OSW received a call from Mr. Stern this morning. Advised that I have reached out to [facility] at [employer] and am waiting to receive a response. Mr. Stern is highly distressed regarding this situation and is worried the landlord may try to evict him. He paid his rent for February and the landlord is waiting to receive the check from [employer] for January. Mr. Stern has offered to pay the amount for January and then once the check is received from [employer] it can be used towards March, however, the landlord prefers to just wait for the check. OSW contacted [facility] at [employer] via telephone this morning. She is going to consult with their . Advocated and vouched that the check was mailed back by myself. If the  confirms the check hasn't been received in the mail yet, [facility] will see if they can proceed with voiding the initial check or produce a letter of support to provide to the landlord regarding the situation to ensure the funds are there and will be provided. OSW relayed this to Mr. Stern. He v/u and expressed some relief. Once OSW receives an update from [employer], OSW will contact Mr. Stern and his . OSW support remains available.

## 2025-02-04 NOTE — TELEPHONE ENCOUNTER
OSW received a VM from Mr. Stern yesterday afternoon requesting a status update on the rent check that his landlord is anticipating to receive from [employer]. OSW requested Gensis at [employer] to please provide us with a status update, inquiring if she has received the return check yet in order to issue the new one. Awaiting response.

## 2025-02-10 ENCOUNTER — HOSPITAL ENCOUNTER (OUTPATIENT)
Dept: ONCOLOGY | Facility: HOSPITAL | Age: 70
Discharge: HOME | End: 2025-02-10
Admitting: INTERNAL MEDICINE
Payer: MEDICARE

## 2025-02-10 DIAGNOSIS — C25.0 MALIGNANT NEOPLASM OF HEAD OF PANCREAS: Primary | ICD-10-CM

## 2025-02-10 LAB
ALBUMIN SERPL-MCNC: 3.8 G/DL (ref 3.5–5.2)
ALBUMIN/GLOB SERPL: 1.4 G/DL
ALP SERPL-CCNC: 184 U/L (ref 39–117)
ALT SERPL W P-5'-P-CCNC: 25 U/L (ref 1–41)
ANION GAP SERPL CALCULATED.3IONS-SCNC: 9.1 MMOL/L (ref 5–15)
AST SERPL-CCNC: 42 U/L (ref 1–40)
BASOPHILS # BLD AUTO: 0.04 10*3/MM3 (ref 0–0.2)
BASOPHILS NFR BLD AUTO: 0.9 % (ref 0–1.5)
BILIRUB SERPL-MCNC: 0.2 MG/DL (ref 0–1.2)
BUN SERPL-MCNC: 15 MG/DL (ref 8–23)
BUN/CREAT SERPL: 16.5 (ref 7–25)
CALCIUM SPEC-SCNC: 8.5 MG/DL (ref 8.6–10.5)
CHLORIDE SERPL-SCNC: 105 MMOL/L (ref 98–107)
CO2 SERPL-SCNC: 24.9 MMOL/L (ref 22–29)
CREAT SERPL-MCNC: 0.91 MG/DL (ref 0.76–1.27)
DEPRECATED RDW RBC AUTO: 57.7 FL (ref 37–54)
EGFRCR SERPLBLD CKD-EPI 2021: 90.7 ML/MIN/1.73
EOSINOPHIL # BLD AUTO: 0.04 10*3/MM3 (ref 0–0.4)
EOSINOPHIL NFR BLD AUTO: 0.9 % (ref 0.3–6.2)
ERYTHROCYTE [DISTWIDTH] IN BLOOD BY AUTOMATED COUNT: 16.3 % (ref 12.3–15.4)
GLOBULIN UR ELPH-MCNC: 2.8 GM/DL
GLUCOSE SERPL-MCNC: 124 MG/DL (ref 65–99)
HCT VFR BLD AUTO: 34 % (ref 37.5–51)
HGB BLD-MCNC: 10.4 G/DL (ref 13–17.7)
IMM GRANULOCYTES # BLD AUTO: 0.01 10*3/MM3 (ref 0–0.05)
IMM GRANULOCYTES NFR BLD AUTO: 0.2 % (ref 0–0.5)
LYMPHOCYTES # BLD AUTO: 1.49 10*3/MM3 (ref 0.7–3.1)
LYMPHOCYTES NFR BLD AUTO: 32.4 % (ref 19.6–45.3)
MCH RBC QN AUTO: 29.2 PG (ref 26.6–33)
MCHC RBC AUTO-ENTMCNC: 30.6 G/DL (ref 31.5–35.7)
MCV RBC AUTO: 95.5 FL (ref 79–97)
MONOCYTES # BLD AUTO: 0.73 10*3/MM3 (ref 0.1–0.9)
MONOCYTES NFR BLD AUTO: 15.9 % (ref 5–12)
NEUTROPHILS NFR BLD AUTO: 2.29 10*3/MM3 (ref 1.7–7)
NEUTROPHILS NFR BLD AUTO: 49.7 % (ref 42.7–76)
NRBC BLD AUTO-RTO: 0 /100 WBC (ref 0–0.2)
PLATELET # BLD AUTO: 199 10*3/MM3 (ref 140–450)
PMV BLD AUTO: 10.1 FL (ref 6–12)
POTASSIUM SERPL-SCNC: 4.5 MMOL/L (ref 3.5–5.2)
PROT SERPL-MCNC: 6.6 G/DL (ref 6–8.5)
RBC # BLD AUTO: 3.56 10*6/MM3 (ref 4.14–5.8)
SODIUM SERPL-SCNC: 139 MMOL/L (ref 136–145)
WBC NRBC COR # BLD AUTO: 4.6 10*3/MM3 (ref 3.4–10.8)

## 2025-02-10 PROCEDURE — 36591 DRAW BLOOD OFF VENOUS DEVICE: CPT

## 2025-02-10 PROCEDURE — 25010000002 HEPARIN LOCK FLUSH PER 10 UNITS: Performed by: INTERNAL MEDICINE

## 2025-02-10 PROCEDURE — 80053 COMPREHEN METABOLIC PANEL: CPT | Performed by: INTERNAL MEDICINE

## 2025-02-10 PROCEDURE — 85025 COMPLETE CBC W/AUTO DIFF WBC: CPT | Performed by: INTERNAL MEDICINE

## 2025-02-10 RX ORDER — SODIUM CHLORIDE 0.9 % (FLUSH) 0.9 %
20 SYRINGE (ML) INJECTION AS NEEDED
OUTPATIENT
Start: 2025-02-10

## 2025-02-10 RX ORDER — HEPARIN SODIUM (PORCINE) LOCK FLUSH IV SOLN 100 UNIT/ML 100 UNIT/ML
500 SOLUTION INTRAVENOUS AS NEEDED
OUTPATIENT
Start: 2025-02-10

## 2025-02-10 RX ORDER — HEPARIN SODIUM (PORCINE) LOCK FLUSH IV SOLN 100 UNIT/ML 100 UNIT/ML
500 SOLUTION INTRAVENOUS AS NEEDED
Status: DISCONTINUED | OUTPATIENT
Start: 2025-02-10 | End: 2025-02-11 | Stop reason: HOSPADM

## 2025-02-10 RX ORDER — SODIUM CHLORIDE 0.9 % (FLUSH) 0.9 %
20 SYRINGE (ML) INJECTION AS NEEDED
Status: DISCONTINUED | OUTPATIENT
Start: 2025-02-10 | End: 2025-02-11 | Stop reason: HOSPADM

## 2025-02-10 RX ADMIN — HEPARIN 500 UNITS: 100 SYRINGE at 10:57

## 2025-02-10 RX ADMIN — Medication 20 ML: at 10:57

## 2025-02-11 ENCOUNTER — OFFICE VISIT (OUTPATIENT)
Dept: ONCOLOGY | Facility: HOSPITAL | Age: 70
End: 2025-02-11
Payer: MEDICARE

## 2025-02-11 ENCOUNTER — DOCUMENTATION (OUTPATIENT)
Dept: ONCOLOGY | Facility: HOSPITAL | Age: 70
End: 2025-02-11
Payer: MEDICARE

## 2025-02-11 VITALS
BODY MASS INDEX: 23.88 KG/M2 | HEART RATE: 77 BPM | RESPIRATION RATE: 20 BRPM | TEMPERATURE: 96.8 F | DIASTOLIC BLOOD PRESSURE: 96 MMHG | WEIGHT: 147.93 LBS | SYSTOLIC BLOOD PRESSURE: 149 MMHG | OXYGEN SATURATION: 97 %

## 2025-02-11 DIAGNOSIS — C25.9 PANCREATIC ADENOCARCINOMA: Primary | ICD-10-CM

## 2025-02-11 DIAGNOSIS — C25.0 MALIGNANT NEOPLASM OF HEAD OF PANCREAS: Primary | ICD-10-CM

## 2025-02-11 DIAGNOSIS — R63.4 UNINTENTIONAL WEIGHT LOSS: ICD-10-CM

## 2025-02-11 DIAGNOSIS — R63.0 DECREASED APPETITE: ICD-10-CM

## 2025-02-11 PROCEDURE — G0463 HOSPITAL OUTPT CLINIC VISIT: HCPCS | Performed by: INTERNAL MEDICINE

## 2025-02-11 NOTE — ASSESSMENT & PLAN NOTE
Patient has completed 8 cycles of neoadjuvant FOLFIRINOX.  He has been seen by surgical oncology.  Plan for Whipple later this month.  He still has some fatigue and mild cytopenias related to the regimen.  I will see him back in 2 months which would be in the postoperative setting to review his pathology and discuss adjuvant treatment if needed.

## 2025-02-11 NOTE — PROGRESS NOTES
Outpatient Nutrition Oncology Follow Up    Patient Name: Josue Stern  YOB: 1955  MRN: [id number]    Recommendation(s):   Diet per pt's GI surgeon post-whipple procedure.  Will send oral nutrition supplement referral to [facility] (either 6 Ensure Plus PO daily or 4 Ensure Complete PO daily.      Reason for Consult:  Pt request    Diagnosis:  pancreatic Ca       Current Treatment:   mFOLFIRINOX    Today's Weight:  67.1 kg    Weight Change:    Wt stable since 1/14/25  3% wt decline from 12/31/24-1/14/25    Estimated nutritional needs (daily):  [id number] kcals  101-134 gm protein    Nutrition-related concerns: Diarrhea, cold intolerance  (Pt seen 10/1/24 and 12/3/24 to review MNT for diarrhea)    Consult or Intervention:   Met with pt yesterday 2/10/25 per his request and provided samples of Ensure Complete. Provided more samples of Ensure Complete today.  Pt consumes either Ensure Plus or Ensure Complete 2-3 X day.  He reports that he is scheduled to receive a laparotomy with whipple procedure in [place] soon and that his surgeon suggested he remain on ONS post-surgery.  With current insurance plan, it is unknown if there is coverage for ONS.  Pt has less than adequate financial means to afford 2-3 ONS per day and will likely require more post-surgery.     Nutrition Diagnosis: Increased nutrient needs related to increased nutrient needs due to catabolic disease as evidenced by physiological causes increasing nutrient needs.    Plan: Will follow up per oncology nutrition protocol

## 2025-02-11 NOTE — PROGRESS NOTES
Chief Complaint  Malignant neoplasm of head of pancreas    Forrest Duong MD Smith, Forrest Leon MD    Subjective          Josue Stern presents to [facility] for follow-up of his pancreatic cancer.  He has completed neoadjuvant FOLFIRINOX.  He has been seen by Dr. Fontenot, surgical oncology at [facility].  Those records reviewed.  They planned Whipple procedure at the end of this month.  He states he still having some fatigue and neuropathy from his regimen but otherwise feeling okay.  No issues from his Port-A-Cath.  He denies new masses or adenopathy.    Oncology/Hematology History   Malignant neoplasm of head of pancreas   9/5/2024 Initial Diagnosis    Malignant neoplasm of pancreas     9/5/2024 -  Chemotherapy    OP CENTRAL VENOUS ACCESS DEVICE Access, Care, and Maintenance (CVAD)     9/17/2024 -  Chemotherapy    OP PANCREATIC mFOLFIRINOX (OXALIplatin / Leucovorin / Irinotecan / Fluorouracil CIV)           Current Outpatient Medications on File Prior to Visit   Medication Sig Dispense Refill    albuterol sulfate  (90 Base) MCG/ACT inhaler Inhale 2 puffs Every 4 (Four) Hours As Needed for Wheezing. 18 g 0    amLODIPine (Norvasc) 5 MG tablet Take 1 tablet by mouth Daily.      amoxicillin (AMOXIL) 500 MG capsule Take 1 capsule by mouth 3 times a day.      azithromycin (Zithromax Z-Tomasz) 250 MG tablet Take 2 tablets by mouth on day 1, then 1 tablet daily on days 2-5 6 tablet 0    buprenorphine-naloxone (Suboxone) 8-2 MG film film Place 1 film under the tongue 2 (Two) Times a Day.      busPIRone (BUSPAR) 15 MG tablet Take 1 tablet by mouth 3 (Three) Times a Day.      cetirizine (ZyrTEC Allergy) 10 MG tablet Take 1 tablet by mouth Daily.      DULoxetine (CYMBALTA) 30 MG capsule TAKE 1 CAPSULE BY MOUTH DAILY 30 capsule 1    gabapentin (NEURONTIN) 800 MG tablet Take 1 tablet by mouth 3 (Three) Times a Day.      ibuprofen (ADVIL,MOTRIN) 800 MG  tablet Take 1 tablet by mouth Every 8 (Eight) Hours As Needed for Mild Pain. 30 tablet 1    lidocaine-prilocaine (EMLA) 2.5-2.5 % cream Apply 1 Application topically to the appropriate area as directed As Needed for Injection Site Pain. 30 g 1    ondansetron (ZOFRAN) 8 MG tablet Take 1 tablet by mouth 3 (Three) Times a Day As Needed for Nausea or Vomiting. 30 tablet 5    prazosin (MINIPRESS) 1 MG capsule Take 1 capsule by mouth Every Night.      predniSONE (DELTASONE) 50 MG tablet Take 1 tablet by mouth Daily. 5 tablet 0    propranolol LA (Inderal LA) 60 MG 24 hr capsule Take 1 capsule by mouth Daily.       Current Facility-Administered Medications on File Prior to Visit   Medication Dose Route Frequency Provider Last Rate Last Admin    [DISCONTINUED] heparin injection 500 Units  500 Units Intravenous PRN Gene Blanchard MD   500 Units at 02/10/25 1057    [DISCONTINUED] sodium chloride 0.9 % flush 20 mL  20 mL Intravenous PRN Gene Blanchard MD   20 mL at 02/10/25 1057       Allergies   Allergen Reactions    Aspirin Nausea Only     Past Medical History:   Diagnosis Date    Allergies     Asthma     GERD (gastroesophageal reflux disease)     Hypertension     Malignant neoplasm of pancreas 09/05/2024     Past Surgical History:   Procedure Laterality Date    HERNIA REPAIR       Social History     Socioeconomic History    Marital status: Single   Tobacco Use    Smoking status: Every Day     Current packs/day: 0.50     Types: Cigarettes    Smokeless tobacco: Never   Vaping Use    Vaping status: Every Day   Substance and Sexual Activity    Alcohol use: Never    Drug use: Never    Sexual activity: Defer     History reviewed. No pertinent family history.    Objective   Physical Exam  Vitals reviewed. Exam conducted with a chaperone present.   Cardiovascular:      Rate and Rhythm: Normal rate and regular rhythm.      Heart sounds: Normal heart sounds. No murmur heard.     No gallop.   Pulmonary:      Effort: Pulmonary  effort is normal.      Breath sounds: Normal breath sounds.      Comments: Port-A-Cath  Abdominal:      General: Bowel sounds are normal.   Lymphadenopathy:      Cervical: No cervical adenopathy.   Psychiatric:         Mood and Affect: Mood normal.         Behavior: Behavior normal.         Vitals:    02/11/25 0835   BP: 149/96   Pulse: 77   Resp: 20   Temp: 96.8 °F (36 °C)   TempSrc: Temporal   SpO2: 97%   Weight: 67.1 kg (147 lb 14.9 oz)   PainSc: 0-No pain     ECOG score: 0         PHQ-9 Total Score:                    Result Review :   The following data was reviewed by: Gene Blanchard MD on 02/11/2025:  Lab Results   Component Value Date    HGB 10.4 (L) 02/10/2025    HCT 34.0 (L) 02/10/2025    MCV 95.5 02/10/2025     02/10/2025    WBC 4.60 02/10/2025    NEUTROABS 2.29 02/10/2025    LYMPHSABS 1.49 02/10/2025    MONOSABS 0.73 02/10/2025    EOSABS 0.04 02/10/2025    BASOSABS 0.04 02/10/2025     Lab Results   Component Value Date    GLUCOSE 124 (H) 02/10/2025    BUN 15 02/10/2025    CREATININE 0.91 02/10/2025     02/10/2025    K 4.5 02/10/2025     02/10/2025    CO2 24.9 02/10/2025    CALCIUM 8.5 (L) 02/10/2025    PROTEINTOT 6.6 02/10/2025    ALBUMIN 3.8 02/10/2025    BILITOT 0.2 02/10/2025    ALKPHOS 184 (H) 02/10/2025    AST 42 (H) 02/10/2025    ALT 25 02/10/2025     Lab Results   Component Value Date    MG 1.7 12/10/2021    PHOS 3.4 12/10/2021    FREET4 1.74 06/30/2020    TSH 0.256 (L) 12/06/2021     Lab Results   Component Value Date    IRON 15 (L) 12/08/2021    LABIRON 7 (L) 12/08/2021    TRANSFERRIN 150 (L) 12/08/2021    TIBC 224 (L) 12/08/2021     Lab Results   Component Value Date    FERRITIN 75 12/21/2020    [id number] 1,022 (H) 12/08/2021    FOLATE 10.90 12/08/2021     Lab Results   Component Value Date     168.0 (H) 01/14/2025             Assessment and Plan    Diagnoses and all orders for this visit:    1. Malignant neoplasm of head of pancreas (Primary)  Assessment &  Plan:  Patient has completed 8 cycles of neoadjuvant FOLFIRINOX.  He has been seen by surgical oncology.  Plan for Whipple later this month.  He still has some fatigue and mild cytopenias related to the regimen.  I will see him back in 2 months which would be in the postoperative setting to review his pathology and discuss adjuvant treatment if needed.    Orders:  -     CBC & Differential; Future  -     Comprehensive Metabolic Panel; Future  -     Cancer Antigen 19-9; Future            Patient Follow Up: 2 months    Patient was given instructions and counseling regarding his condition or for health maintenance advice. Please see specific information pulled into the AVS if appropriate.     Gene Blanchard MD    2/11/2025

## 2025-02-13 ENCOUNTER — TELEPHONE (OUTPATIENT)
Dept: RADIATION ONCOLOGY | Facility: HOSPITAL | Age: 70
End: 2025-02-13
Payer: MEDICARE

## 2025-02-13 NOTE — TELEPHONE ENCOUNTER
Outpatient Nutrition Oncology Telephone Encounters    Patient Name: Josue Stern  YOB: 1955  MRN: [id number]      Consult or Intervention:  Received communication from [facility] that pt's insurance will provide coverage for ONS and they can proceed with service of ONS deliveries.  Oncology RD communicated this information to pt over the phone.  Pt stated he will be residing with his sister (Alena Stern) starting 2/20/25 until his Whipple on 2/24/25 at [facility].  Pt requested Oncology RD call pt's sister to obtain a correct address for the ONS deliveries to be sent to after 2/20.  Called pt's sister and she confirmed pt will be residing w/ her on 2/20.  Pt's sister provided her address and agreed that ONS deliveries can go to her home during the time pt resides there.  With Alena's permission, Oncology RD provided the above information to [facility], including Alena's address and contact information.    Plan: Will follow up per oncology nutrition protocol              no

## 2025-02-18 ENCOUNTER — TELEPHONE (OUTPATIENT)
Dept: ONCOLOGY | Facility: HOSPITAL | Age: 70
End: 2025-02-18
Payer: MEDICARE

## 2025-02-18 NOTE — TELEPHONE ENCOUNTER
SPOKE WITH PATIENT AND LET HIM KNOW THAT WE HAVE HIS  2 MO LAB AND FOLLOW UP APPT  WITH REUBEN COREY SCHEDULED FOR 4/3/25 AT 1:00PM AND 4/8/25 AT 2:45 PM. MAILING APPT REMINDERS AS WELL PER PT REQUEST.

## 2025-03-03 ENCOUNTER — TELEPHONE (OUTPATIENT)
Dept: ONCOLOGY | Facility: HOSPITAL | Age: 70
End: 2025-03-03
Payer: MEDICARE

## 2025-03-03 NOTE — TELEPHONE ENCOUNTER
OSW received a notification from the oncology RD that Mr. Stern is requesting to speak with OSW. OSW attempted to reach Mr. Stern this afternoon and left a VM with my direct number where he may reach me back at. OSW support remains available.

## 2025-03-03 NOTE — TELEPHONE ENCOUNTER
Outpatient Nutrition Oncology Follow Up    Patient Name: Jsoue Stern  YOB: 1955  MRN: [id number]      Reason for Consult:  Nutrition referral per pt's surgeon request (Dr. Pickett)    Consult or Intervention:  S/P Whipple surgery on 2/24/25.  Pt's surgeon requests f/u 1 week post-surgery to assess for nutrition-related issues.  Marlys Rodrígeuz RD from [facility], requested Oncology RD reach out to pt to assess post-op, since already following pt.  Marlys provided the questions Dr. Pickett requests to ask pt, as below. The conversation was as follows:    1. How many 8 oz cups of water are you drinking a day?    16 oz bottles of water daily (48 oz/day)    2. How many calories are you eating a day?    Pt is unsure of calorie amount, however he is eating well.  He can eat a variety of foods (dairy, beans- for example).  He is avoiding spicy foods, greasy foods and tough meats.  He is eating slowly and chewing well at meals.  His appetite is excellent.  Per diet recall, pt is consuming ~2200 kcals, 50-60 gm protein daily.    3. Are you losing weight?    He has not weighed himself since being home from the hospital.    4. Are you gaining weight?  He has not weighed himself, therefore he does not know.    5. Are you able to complete ADLS?  He has no problems performing ADL's    6. Are you fatigued?  Reports fatigue is what he expected post-Whipple procedure.  He does not feel more fatigued than before.  He plans to walk a bit today and will return home once he is tired.    7. Are you having diarrhea?  Soft BM's, and a bit softer with dairy or ice cream, but not diarrhea.  Pt reports 2 BM's per day.    8. If you are having diarrhea, how many times a day?  See above    9. Are you experiencing constipation?  No    10. Are you experiencing bloating?  Reports gas formation, but reports significant improvement in this compared to prior to surgery.    11. Are you experiencing reflux?  No    12. Do you have  "uncontrolled nausea?  No    13. Is blood sugar well-controlled?  Pt has not checked his BG levels.  He will remind his sister (who is he living with) to purchase a glucometer.    Diet information for post-whipple was reviewed.  Will mail a copy to pt, although he believes his sister already received the diet information.  Pt's main concern at this time is not having received his Boost shakes from [facility].  Order for ONS was sent to [facility] on 2/11/25 as follows:  \"1 container of Ensure Plus 6 times a day PO.  For Ensure Complete, consume 1 container 4 times daily PO.\"  Oncology RD called Keysha from [facility] to check on the status of pt's order- everything was being processed last week.  Keysha reports she will make sure staff knows this is an urgent order.  Communicated the above information to Marlys Rodríguez and requested she forward the information to his clinical team and surgeon.    Nutrition Diagnosis: Increased nutrient needs related to increased nutrient needs due to catabolic disease as evidenced by physiological causes increasing nutrient needs. and hypermetabolic state.    Plan: Will follow up per oncology nutrition protocol      Addendum 3/4/25:  Received email from [facility] that pt's insurance did not approve the oral nutrition supplement order.  Called pt to inform him.  Offered to send the first option we spoke about (early Feb 2024, [employer] Patient Assistance Program).  Pt opted to purchase OOP from [employer] at this time.  Encouraged pt to call Oncology RD if wanting to proceed with the [employer] program.  Pt v/u.  Oncology RD will be following pt closely 2' surgeon's request.     "

## 2025-03-05 NOTE — TELEPHONE ENCOUNTER
Diagnosis: Pancreatic cancer    Reason for Referral: Rental assistance    Content of Visit: OSW contacted Mr. Stern again this morning. He shared that he underwent Whipple surgery and is currently staying at his sister's in [place] while he recovers for a couple weeks. Since he's currently unable to work until he heals from surgery, he's inquiring if he can receive any additional assistance with April's rent. OSW reached out to [employer] to inquire if they can provide additional rental assistance, and if so, provided Anastasia Hayes with the updated information for the . OSW support remains available.     Resources/Referrals Provided: [employer] - rental assistance

## 2025-03-06 NOTE — TELEPHONE ENCOUNTER
OSW received a response from [employer] that they are sending a check request for the rent in the amount of $525. OSW contacted Mr. Stern to make him aware. He v/u and expressed appreciation. OSW support remains available.

## 2025-03-10 ENCOUNTER — TELEPHONE (OUTPATIENT)
Dept: ONCOLOGY | Facility: HOSPITAL | Age: 70
End: 2025-03-10
Payer: MEDICARE

## 2025-03-10 NOTE — TELEPHONE ENCOUNTER
Outpatient Nutrition Oncology Follow Up    Patient Name: Josue Stern  YOB: 1955  MRN: [id number]      Reason for Consult:  Nutrition referral per pt's surgeon request (Dr. Pickett)    Today's Weight:   139#    Weight Change:  5.4% wt decline in the past month    Nutrition-related concerns:  UWL    Current PO intake:  able to consume a variety of foods, but avoiding spicy, greasy foods and tough meats     Current Nutrition Supplement intake:   Premier protein shakes BID    Consult or Intervention:  F/U phone call per Daniel PATEL request. S/P Whipple surgery 2/24/25. Reviewed all questions as asked last week- please refer to Oncology RD documentation from 3/3/25.  Today pt reports he is still drinking water adequately (48 oz per day) + drinking Premier protein shakes.  He reports that he feels like he is eating well and has a good appetite.  Pt weighed himself today:  139#, which indicates a significant wt decline in the past month.  He is concerned with the wt decline.  He does not have any changes to ADL's or fatigue.  He denies n/v, reflux, constipation or diarrhea, but reports ongoing gas production- however this has improved since surgery.  Pt has not checked his BG levels due to he does not have a glucometer. Pt ended up purchasing the Premier protein shakes OOP due to insurance would not provide coverage for oral nutrition shakes after all.  Encouraged pt to alternate the high protein shakes with Ensure Plus or Boost Plus to provide more kcals.  Pt has an appointment with his surgeon on 3/10/25.  Offered to give pt samples of Boost Plus and Boost VHC (now available) but he declined- living with sister in [place] and was just in [place] this morning- prior to our conversation.    Nutrition Diagnosis: Unintentional weight loss related to increased nutrient needs due to catabolic disease as evidenced by physiological causes increasing nutrient needs. and hypermetabolic  state.    Plan: Will follow up per oncology nutrition protocol  Communicated all of the above information to pt's Medical Oncologist as well as his surgeon and surgical staff at [facility].

## 2025-03-17 ENCOUNTER — DOCUMENTATION (OUTPATIENT)
Dept: ONCOLOGY | Facility: HOSPITAL | Age: 70
End: 2025-03-17
Payer: MEDICARE

## 2025-03-17 NOTE — PROGRESS NOTES
Diagnosis: Pancreatic cancer    Reason for Referral: Rental assistance    Content of Visit: Mr. Stern presented to OSW office this afternoon, advising that the rent check sent by An[employer] was written to the incorrect landlord. OSW scanned a copy of this to Anastasia Hayes and they are agreeable to mailing out a new check to [facility] property management. OSW mailed Anastasia Hayes the initial check back this afternoon. Mr. Stern reports overall he is doing well. He is pleased with how his surgery and recovery has gone and won't follow back up with his surgeon for another six months. He acknowledges the difference it's made in his recovery staying at his sister's house in [place], as she helped with his medications, meals, etc. He is now back at his apartment here in [place]. He's been getting up and walking outside. He's eating well and remains hungry, but struggling with his weight. He met with the oncology RD today to receive some oral nutrition supplements. He's been cleared to go back to work, but is still working on recovering more before slowly working himself back. Encouraged OSW support remains available. He expressed appreciation.    Resources/Referrals Provided: Rental assistance through An[employer], oncology RD for ONS

## 2025-03-18 ENCOUNTER — DOCUMENTATION (OUTPATIENT)
Dept: ONCOLOGY | Facility: HOSPITAL | Age: 70
End: 2025-03-18
Payer: MEDICARE

## 2025-03-18 NOTE — PROGRESS NOTES
Outpatient Nutrition Oncology Follow Up    Patient Name: Josue Stern  YOB: 1955  MRN: [id number]      Reason for Consult:  Pt request (on 3/17/25)    Consult or Intervention:  Pt requested samples of ONS on 3/17/25.  After a lengthy process to obtain insurance coverage in February 2025, his insurance denied coverage.  Provided several samples of Boost VHC to pt on 3/17.  He expressed appreciation.      Email communicating all information documented on pt since his Whipple surgery (documentation on 3/4/25 and 3/10/25) was sent to his [facility] surgical team on 3/10/25.  Oncology RD recommended pt receive further nutrition consultation from RD that specializes in pancreatic nutrition to address pt's ongoing wt decline.      Oncology RD remains available peer protocol.

## 2025-04-03 ENCOUNTER — HOSPITAL ENCOUNTER (OUTPATIENT)
Dept: ONCOLOGY | Facility: HOSPITAL | Age: 70
Discharge: HOME | End: 2025-04-03
Admitting: INTERNAL MEDICINE
Payer: MEDICARE

## 2025-04-03 DIAGNOSIS — C25.0 MALIGNANT NEOPLASM OF HEAD OF PANCREAS: Primary | ICD-10-CM

## 2025-04-03 LAB
ALBUMIN SERPL-MCNC: 3.1 G/DL (ref 3.5–5.2)
ALBUMIN/GLOB SERPL: 1.1 G/DL
ALP SERPL-CCNC: 172 U/L (ref 39–117)
ALT SERPL W P-5'-P-CCNC: 27 U/L (ref 1–41)
ANION GAP SERPL CALCULATED.3IONS-SCNC: 6.3 MMOL/L (ref 5–15)
AST SERPL-CCNC: 55 U/L (ref 1–40)
BASOPHILS # BLD AUTO: 0.02 10*3/MM3 (ref 0–0.2)
BASOPHILS NFR BLD AUTO: 0.3 % (ref 0–1.5)
BILIRUB SERPL-MCNC: 0.2 MG/DL (ref 0–1.2)
BUN SERPL-MCNC: 9 MG/DL (ref 8–23)
BUN/CREAT SERPL: 8.3 (ref 7–25)
CALCIUM SPEC-SCNC: 8.1 MG/DL (ref 8.6–10.5)
CANCER AG19-9 SERPL-ACNC: 168 U/ML
CHLORIDE SERPL-SCNC: 108 MMOL/L (ref 98–107)
CO2 SERPL-SCNC: 24.7 MMOL/L (ref 22–29)
CREAT SERPL-MCNC: 1.08 MG/DL (ref 0.76–1.27)
DEPRECATED RDW RBC AUTO: 46.1 FL (ref 37–54)
EGFRCR SERPLBLD CKD-EPI 2021: 73.8 ML/MIN/1.73
EOSINOPHIL # BLD AUTO: 0.06 10*3/MM3 (ref 0–0.4)
EOSINOPHIL NFR BLD AUTO: 0.9 % (ref 0.3–6.2)
ERYTHROCYTE [DISTWIDTH] IN BLOOD BY AUTOMATED COUNT: 13.9 % (ref 12.3–15.4)
GLOBULIN UR ELPH-MCNC: 2.8 GM/DL
GLUCOSE SERPL-MCNC: 148 MG/DL (ref 65–99)
HCT VFR BLD AUTO: 32.5 % (ref 37.5–51)
HGB BLD-MCNC: 10.3 G/DL (ref 13–17.7)
IMM GRANULOCYTES # BLD AUTO: 0.01 10*3/MM3 (ref 0–0.05)
IMM GRANULOCYTES NFR BLD AUTO: 0.1 % (ref 0–0.5)
LYMPHOCYTES # BLD AUTO: 2.39 10*3/MM3 (ref 0.7–3.1)
LYMPHOCYTES NFR BLD AUTO: 35.5 % (ref 19.6–45.3)
MCH RBC QN AUTO: 28.6 PG (ref 26.6–33)
MCHC RBC AUTO-ENTMCNC: 31.7 G/DL (ref 31.5–35.7)
MCV RBC AUTO: 90.3 FL (ref 79–97)
MONOCYTES # BLD AUTO: 0.55 10*3/MM3 (ref 0.1–0.9)
MONOCYTES NFR BLD AUTO: 8.2 % (ref 5–12)
NEUTROPHILS NFR BLD AUTO: 3.7 10*3/MM3 (ref 1.7–7)
NEUTROPHILS NFR BLD AUTO: 55 % (ref 42.7–76)
NRBC BLD AUTO-RTO: 0 /100 WBC (ref 0–0.2)
PLATELET # BLD AUTO: 183 10*3/MM3 (ref 140–450)
PMV BLD AUTO: 9.7 FL (ref 6–12)
POTASSIUM SERPL-SCNC: 4.5 MMOL/L (ref 3.5–5.2)
PROT SERPL-MCNC: 5.9 G/DL (ref 6–8.5)
RBC # BLD AUTO: 3.6 10*6/MM3 (ref 4.14–5.8)
SODIUM SERPL-SCNC: 139 MMOL/L (ref 136–145)
WBC NRBC COR # BLD AUTO: 6.73 10*3/MM3 (ref 3.4–10.8)

## 2025-04-03 PROCEDURE — 36591 DRAW BLOOD OFF VENOUS DEVICE: CPT

## 2025-04-03 PROCEDURE — 80053 COMPREHEN METABOLIC PANEL: CPT | Performed by: INTERNAL MEDICINE

## 2025-04-03 PROCEDURE — 85025 COMPLETE CBC W/AUTO DIFF WBC: CPT | Performed by: INTERNAL MEDICINE

## 2025-04-03 PROCEDURE — 25010000002 HEPARIN LOCK FLUSH PER 10 UNITS: Performed by: INTERNAL MEDICINE

## 2025-04-03 PROCEDURE — 86301 IMMUNOASSAY TUMOR CA 19-9: CPT | Performed by: INTERNAL MEDICINE

## 2025-04-03 RX ORDER — HEPARIN SODIUM (PORCINE) LOCK FLUSH IV SOLN 100 UNIT/ML 100 UNIT/ML
500 SOLUTION INTRAVENOUS AS NEEDED
OUTPATIENT
Start: 2025-04-03

## 2025-04-03 RX ORDER — SODIUM CHLORIDE 0.9 % (FLUSH) 0.9 %
20 SYRINGE (ML) INJECTION AS NEEDED
OUTPATIENT
Start: 2025-04-03

## 2025-04-03 RX ORDER — SODIUM CHLORIDE 0.9 % (FLUSH) 0.9 %
20 SYRINGE (ML) INJECTION AS NEEDED
Status: DISCONTINUED | OUTPATIENT
Start: 2025-04-03 | End: 2025-04-04 | Stop reason: HOSPADM

## 2025-04-03 RX ORDER — HEPARIN SODIUM (PORCINE) LOCK FLUSH IV SOLN 100 UNIT/ML 100 UNIT/ML
500 SOLUTION INTRAVENOUS AS NEEDED
Status: DISCONTINUED | OUTPATIENT
Start: 2025-04-03 | End: 2025-04-04 | Stop reason: HOSPADM

## 2025-04-03 RX ADMIN — Medication 20 ML: at 13:05

## 2025-04-03 RX ADMIN — HEPARIN 500 UNITS: 100 SYRINGE at 13:05

## 2025-04-08 ENCOUNTER — OFFICE VISIT (OUTPATIENT)
Dept: ONCOLOGY | Facility: HOSPITAL | Age: 70
End: 2025-04-08
Payer: MEDICARE

## 2025-04-08 VITALS
TEMPERATURE: 97.8 F | OXYGEN SATURATION: 98 % | HEART RATE: 69 BPM | DIASTOLIC BLOOD PRESSURE: 93 MMHG | BODY MASS INDEX: 23.48 KG/M2 | WEIGHT: 145.5 LBS | RESPIRATION RATE: 18 BRPM | SYSTOLIC BLOOD PRESSURE: 150 MMHG

## 2025-04-08 DIAGNOSIS — C25.0 MALIGNANT NEOPLASM OF HEAD OF PANCREAS: ICD-10-CM

## 2025-04-08 PROCEDURE — G0463 HOSPITAL OUTPT CLINIC VISIT: HCPCS | Performed by: INTERNAL MEDICINE

## 2025-04-08 RX ORDER — ONDANSETRON 8 MG/1
8 TABLET, FILM COATED ORAL 3 TIMES DAILY PRN
Qty: 30 TABLET | Refills: 5 | Status: SHIPPED | OUTPATIENT
Start: 2025-04-08

## 2025-04-08 NOTE — PROGRESS NOTES
Chief Complaint  Malignant neoplasm of head of pancreas    Forrest Duong MD Smith, Forrest Leon MD    Subjective          Josue Stern presents to [facility] for pancreatic cancer.  He has now completed Whipple procedure.  Dr. Fontenot, surgical oncology records reviewed.  He had residual adenocarcinoma in the pancreas as well as 1/43 lymph nodes involved.  He states that the abdominal incision is healing well.  He still feels fatigued but is getting back to his normal routine including working.  He denies new masses, adenopathy, unusual aches or pains.  No issues from his Port-A-Cath.    Oncology/Hematology History   Malignant neoplasm of head of pancreas   9/5/2024 Initial Diagnosis    Malignant neoplasm of pancreas     9/5/2024 -  Chemotherapy    OP CENTRAL VENOUS ACCESS DEVICE Access, Care, and Maintenance (CVAD)     9/17/2024 -  Chemotherapy    OP PANCREATIC mFOLFIRINOX (OXALIplatin / Leucovorin / Irinotecan / Fluorouracil CIV)     4/8/2025 Cancer Staged    Staging form: Exocrine Pancreas, AJCC 8th Edition  - Pathologic: Stage IIB (ypT2, ypN1, cM0) - Signed by Gene Blanchard MD on 4/8/2025           Current Outpatient Medications on File Prior to Visit   Medication Sig Dispense Refill    amLODIPine (Norvasc) 5 MG tablet Take 1 tablet by mouth Daily.      buprenorphine-naloxone (Suboxone) 8-2 MG film film Place 1 film under the tongue 2 (Two) Times a Day.      busPIRone (BUSPAR) 15 MG tablet Take 1 tablet by mouth 3 (Three) Times a Day.      gabapentin (NEURONTIN) 800 MG tablet Take 1 tablet by mouth 3 (Three) Times a Day.      ibuprofen (ADVIL,MOTRIN) 800 MG tablet Take 1 tablet by mouth Every 8 (Eight) Hours As Needed for Mild Pain. 30 tablet 1    albuterol sulfate  (90 Base) MCG/ACT inhaler Inhale 2 puffs Every 4 (Four) Hours As Needed for Wheezing. 18 g 0    amoxicillin (AMOXIL) 500 MG capsule Take 1 capsule by mouth 3 times a day. (Patient  not taking: Reported on 4/8/2025)      azithromycin (Zithromax Z-Tomasz) 250 MG tablet Take 2 tablets by mouth on day 1, then 1 tablet daily on days 2-5 (Patient not taking: Reported on 4/8/2025) 6 tablet 0    cetirizine (ZyrTEC Allergy) 10 MG tablet Take 1 tablet by mouth Daily. (Patient not taking: Reported on 4/8/2025)      DULoxetine (CYMBALTA) 30 MG capsule TAKE 1 CAPSULE BY MOUTH DAILY 30 capsule 1    lidocaine-prilocaine (EMLA) 2.5-2.5 % cream Apply 1 Application topically to the appropriate area as directed As Needed for Injection Site Pain. 30 g 1    prazosin (MINIPRESS) 1 MG capsule Take 1 capsule by mouth Every Night. (Patient not taking: Reported on 4/8/2025)      predniSONE (DELTASONE) 50 MG tablet Take 1 tablet by mouth Daily. (Patient not taking: Reported on 4/8/2025) 5 tablet 0    propranolol LA (Inderal LA) 60 MG 24 hr capsule Take 1 capsule by mouth Daily. (Patient not taking: Reported on 4/8/2025)      [DISCONTINUED] ondansetron (ZOFRAN) 8 MG tablet Take 1 tablet by mouth 3 (Three) Times a Day As Needed for Nausea or Vomiting. 30 tablet 5     No current facility-administered medications on file prior to visit.       Allergies   Allergen Reactions    Aspirin Nausea Only     Past Medical History:   Diagnosis Date    Allergies     Asthma     GERD (gastroesophageal reflux disease)     Hypertension     Malignant neoplasm of pancreas 09/05/2024     Past Surgical History:   Procedure Laterality Date    HERNIA REPAIR       Social History     Socioeconomic History    Marital status: Single   Tobacco Use    Smoking status: Every Day     Current packs/day: 0.50     Types: Cigarettes    Smokeless tobacco: Never   Vaping Use    Vaping status: Every Day   Substance and Sexual Activity    Alcohol use: Never    Drug use: Never    Sexual activity: Defer     History reviewed. No pertinent family history.    Objective   Physical Exam  Vitals reviewed. Exam conducted with a chaperone present.   Cardiovascular:      Rate  and Rhythm: Normal rate and regular rhythm.      Heart sounds: Normal heart sounds. No murmur heard.     No gallop.   Pulmonary:      Effort: Pulmonary effort is normal.      Breath sounds: Normal breath sounds.      Comments: Port-A-Cath  Abdominal:      General: Bowel sounds are normal.      Comments: Well-healed midline incision   Lymphadenopathy:      Cervical: No cervical adenopathy.   Psychiatric:         Mood and Affect: Mood normal.         Behavior: Behavior normal.         Vitals:    04/08/25 1450   BP: 150/93   Pulse: 69   Resp: 18   Temp: 97.8 °F (36.6 °C)   TempSrc: Temporal   SpO2: 98%   Weight: 66 kg (145 lb 8.1 oz)   PainSc: 6    PainLoc: Ankle     ECOG score: 0         PHQ-9 Total Score:                    Result Review :   The following data was reviewed by: Gene Blanchard MD on 04/08/2025:  Lab Results   Component Value Date    HGB 10.3 (L) 04/03/2025    HCT 32.5 (L) 04/03/2025    MCV 90.3 04/03/2025     04/03/2025    WBC 6.73 04/03/2025    NEUTROABS 3.70 04/03/2025    LYMPHSABS 2.39 04/03/2025    MONOSABS 0.55 04/03/2025    EOSABS 0.06 04/03/2025    BASOSABS 0.02 04/03/2025     Lab Results   Component Value Date    GLUCOSE 148 (H) 04/03/2025    BUN 9 04/03/2025    CREATININE 1.08 04/03/2025     04/03/2025    K 4.5 04/03/2025     (H) 04/03/2025    CO2 24.7 04/03/2025    CALCIUM 8.1 (L) 04/03/2025    PROTEINTOT 5.9 (L) 04/03/2025    ALBUMIN 3.1 (L) 04/03/2025    BILITOT 0.2 04/03/2025    ALKPHOS 172 (H) 04/03/2025    AST 55 (H) 04/03/2025    ALT 27 04/03/2025     Lab Results   Component Value Date    MG 1.7 12/10/2021    PHOS 3.4 12/10/2021    FREET4 1.74 06/30/2020    TSH 0.256 (L) 12/06/2021     Lab Results   Component Value Date    IRON 15 (L) 12/08/2021    LABIRON 7 (L) 12/08/2021    TRANSFERRIN 150 (L) 12/08/2021    TIBC 224 (L) 12/08/2021     Lab Results   Component Value Date    FERRITIN 75 12/21/2020    WVOYVBGY93 1,022 (H) 12/08/2021    FOLATE 10.90 12/08/2021     Lab  Results   Component Value Date     168.0 (H) 04/03/2025             Assessment and Plan    Diagnoses and all orders for this visit:    1. Malignant neoplasm of head of pancreas  Assessment & Plan:  Patient has undergone neoadjuvant modified FOLFIRINOX x 8 followed by Whipple procedure showing residual cancer including 1 of 43 lymph nodes.  He is recuperating well from his surgery.  I would recommend 4 additional cycles of modified FOLFIRINOX to complete 6 months of perioperative therapy.  Patient is agreeable to treatment as outlined.  I will plan his initial adjuvant cycle (cycle 9) in the near future.  I will see him back for cycle 10-day 1 with lab work prior to monitor for toxicities.  Refill of Zofran provided today.    Orders:  -     ondansetron (ZOFRAN) 8 MG tablet; Take 1 tablet by mouth 3 (Three) Times a Day As Needed for Nausea or Vomiting.  Dispense: 30 tablet; Refill: 5            Patient Follow Up: Cycle 10-day 1    Patient was given instructions and counseling regarding his condition or for health maintenance advice. Please see specific information pulled into the AVS if appropriate.     Gene Blanchard MD    4/8/2025

## 2025-04-08 NOTE — ASSESSMENT & PLAN NOTE
Patient has undergone neoadjuvant modified FOLFIRINOX x 8 followed by Whipple procedure showing residual cancer including 1 of 43 lymph nodes.  He is recuperating well from his surgery.  I would recommend 4 additional cycles of modified FOLFIRINOX to complete 6 months of perioperative therapy.  Patient is agreeable to treatment as outlined.  I will plan his initial adjuvant cycle (cycle 9) in the near future.  I will see him back for cycle 10-day 1 with lab work prior to monitor for toxicities.  Refill of Zofran provided today.

## 2025-04-15 ENCOUNTER — TELEPHONE (OUTPATIENT)
Dept: ONCOLOGY | Facility: HOSPITAL | Age: 70
End: 2025-04-15
Payer: MEDICARE

## 2025-04-15 NOTE — TELEPHONE ENCOUNTER
The pt called and states that he has is sick at his stomach, chills, & HA x2 days. The pt is tx his s/s with OTC meds. The pt states that he has not got out of his bed for the last 2 days. The pt is scheduled for infusion tx  tomorrow and wants to reschedule. This nurse spoke to Kenzie. The pt was given a new apt time of 4/22 at 7:45 am by this nurse. The pt confirmed the new apt.

## 2025-04-22 ENCOUNTER — HOSPITAL ENCOUNTER (OUTPATIENT)
Dept: ONCOLOGY | Facility: HOSPITAL | Age: 70
Discharge: HOME | End: 2025-04-22
Payer: MEDICARE

## 2025-04-22 ENCOUNTER — DOCUMENTATION (OUTPATIENT)
Dept: ONCOLOGY | Facility: HOSPITAL | Age: 70
End: 2025-04-22
Payer: MEDICARE

## 2025-04-22 VITALS
RESPIRATION RATE: 18 BRPM | DIASTOLIC BLOOD PRESSURE: 89 MMHG | TEMPERATURE: 97 F | WEIGHT: 141.4 LBS | HEART RATE: 59 BPM | HEIGHT: 66 IN | OXYGEN SATURATION: 94 % | SYSTOLIC BLOOD PRESSURE: 138 MMHG | BODY MASS INDEX: 22.73 KG/M2

## 2025-04-22 DIAGNOSIS — C25.0 MALIGNANT NEOPLASM OF HEAD OF PANCREAS: Primary | ICD-10-CM

## 2025-04-22 DIAGNOSIS — C25.9 PANCREATIC ADENOCARCINOMA: Primary | ICD-10-CM

## 2025-04-22 DIAGNOSIS — K90.9 STEATORRHEA: ICD-10-CM

## 2025-04-22 LAB
ALBUMIN SERPL-MCNC: 3 G/DL (ref 3.5–5.2)
ALBUMIN/GLOB SERPL: 1.2 G/DL
ALP SERPL-CCNC: 132 U/L (ref 39–117)
ALT SERPL W P-5'-P-CCNC: 22 U/L (ref 1–41)
ANION GAP SERPL CALCULATED.3IONS-SCNC: 7.6 MMOL/L (ref 5–15)
AST SERPL-CCNC: 29 U/L (ref 1–40)
BASOPHILS # BLD AUTO: 0.02 10*3/MM3 (ref 0–0.2)
BASOPHILS NFR BLD AUTO: 0.4 % (ref 0–1.5)
BILIRUB SERPL-MCNC: 0.2 MG/DL (ref 0–1.2)
BUN SERPL-MCNC: 10 MG/DL (ref 8–23)
BUN/CREAT SERPL: 11.1 (ref 7–25)
CALCIUM SPEC-SCNC: 7.8 MG/DL (ref 8.6–10.5)
CHLORIDE SERPL-SCNC: 108 MMOL/L (ref 98–107)
CO2 SERPL-SCNC: 24.4 MMOL/L (ref 22–29)
CREAT SERPL-MCNC: 0.9 MG/DL (ref 0.76–1.27)
DEPRECATED RDW RBC AUTO: 46.7 FL (ref 37–54)
EGFRCR SERPLBLD CKD-EPI 2021: 91.9 ML/MIN/1.73
EOSINOPHIL # BLD AUTO: 0.1 10*3/MM3 (ref 0–0.4)
EOSINOPHIL NFR BLD AUTO: 1.8 % (ref 0.3–6.2)
ERYTHROCYTE [DISTWIDTH] IN BLOOD BY AUTOMATED COUNT: 14.4 % (ref 12.3–15.4)
GLOBULIN UR ELPH-MCNC: 2.6 GM/DL
GLUCOSE SERPL-MCNC: 114 MG/DL (ref 65–99)
HCT VFR BLD AUTO: 34.4 % (ref 37.5–51)
HGB BLD-MCNC: 10.7 G/DL (ref 13–17.7)
IMM GRANULOCYTES # BLD AUTO: 0.02 10*3/MM3 (ref 0–0.05)
IMM GRANULOCYTES NFR BLD AUTO: 0.4 % (ref 0–0.5)
LYMPHOCYTES # BLD AUTO: 1.82 10*3/MM3 (ref 0.7–3.1)
LYMPHOCYTES NFR BLD AUTO: 32.6 % (ref 19.6–45.3)
MCH RBC QN AUTO: 27.9 PG (ref 26.6–33)
MCHC RBC AUTO-ENTMCNC: 31.1 G/DL (ref 31.5–35.7)
MCV RBC AUTO: 89.8 FL (ref 79–97)
MONOCYTES # BLD AUTO: 0.49 10*3/MM3 (ref 0.1–0.9)
MONOCYTES NFR BLD AUTO: 8.8 % (ref 5–12)
NEUTROPHILS NFR BLD AUTO: 3.14 10*3/MM3 (ref 1.7–7)
NEUTROPHILS NFR BLD AUTO: 56 % (ref 42.7–76)
NRBC BLD AUTO-RTO: 0 /100 WBC (ref 0–0.2)
PLATELET # BLD AUTO: 220 10*3/MM3 (ref 140–450)
PMV BLD AUTO: 9.8 FL (ref 6–12)
POTASSIUM SERPL-SCNC: 4.1 MMOL/L (ref 3.5–5.2)
PROT SERPL-MCNC: 5.6 G/DL (ref 6–8.5)
RBC # BLD AUTO: 3.83 10*6/MM3 (ref 4.14–5.8)
SODIUM SERPL-SCNC: 140 MMOL/L (ref 136–145)
WBC NRBC COR # BLD AUTO: 5.59 10*3/MM3 (ref 3.4–10.8)

## 2025-04-22 PROCEDURE — 96417 CHEMO IV INFUS EACH ADDL SEQ: CPT

## 2025-04-22 PROCEDURE — 96415 CHEMO IV INFUSION ADDL HR: CPT

## 2025-04-22 PROCEDURE — 25010000003 DEXTROSE 5 % SOLUTION 250 ML FLEX CONT: Performed by: INTERNAL MEDICINE

## 2025-04-22 PROCEDURE — 96375 TX/PRO/DX INJ NEW DRUG ADDON: CPT

## 2025-04-22 PROCEDURE — 25010000002 PALONOSETRON PER 25 MCG: Performed by: INTERNAL MEDICINE

## 2025-04-22 PROCEDURE — 85025 COMPLETE CBC W/AUTO DIFF WBC: CPT | Performed by: INTERNAL MEDICINE

## 2025-04-22 PROCEDURE — 25010000002 DEXAMETHASONE SODIUM PHOSPHATE 120 MG/30ML SOLUTION: Performed by: INTERNAL MEDICINE

## 2025-04-22 PROCEDURE — 25810000003 SODIUM CHLORIDE 0.9 % SOLUTION 250 ML FLEX CONT: Performed by: INTERNAL MEDICINE

## 2025-04-22 PROCEDURE — 80053 COMPREHEN METABOLIC PANEL: CPT | Performed by: INTERNAL MEDICINE

## 2025-04-22 PROCEDURE — 25010000003 DEXTROSE 5 % SOLUTION: Performed by: INTERNAL MEDICINE

## 2025-04-22 PROCEDURE — G0498 CHEMO EXTEND IV INFUS W/PUMP: HCPCS

## 2025-04-22 PROCEDURE — 96368 THER/DIAG CONCURRENT INF: CPT

## 2025-04-22 PROCEDURE — 25010000002 IRINOTECAN PER 20 MG: Performed by: INTERNAL MEDICINE

## 2025-04-22 PROCEDURE — 25010000002 OXALIPLATIN PER 0.5 MG: Performed by: INTERNAL MEDICINE

## 2025-04-22 PROCEDURE — 25010000002 FLUOROURACIL PER 500 MG: Performed by: INTERNAL MEDICINE

## 2025-04-22 PROCEDURE — 25010000002 LEUCOVORIN CALCIUM PER 50 MG: Performed by: INTERNAL MEDICINE

## 2025-04-22 PROCEDURE — 25010000003 DEXTROSE 5 % SOLUTION 500 ML FLEX CONT: Performed by: INTERNAL MEDICINE

## 2025-04-22 PROCEDURE — 96413 CHEMO IV INFUSION 1 HR: CPT

## 2025-04-22 RX ORDER — DEXTROSE MONOHYDRATE 50 MG/ML
20 INJECTION, SOLUTION INTRAVENOUS ONCE
Status: CANCELLED | OUTPATIENT
Start: 2025-04-22

## 2025-04-22 RX ORDER — SODIUM CHLORIDE 0.9 % (FLUSH) 0.9 %
20 SYRINGE (ML) INJECTION AS NEEDED
Status: CANCELLED | OUTPATIENT
Start: 2025-04-22

## 2025-04-22 RX ORDER — HEPARIN SODIUM (PORCINE) LOCK FLUSH IV SOLN 100 UNIT/ML 100 UNIT/ML
500 SOLUTION INTRAVENOUS AS NEEDED
Status: CANCELLED | OUTPATIENT
Start: 2025-04-22

## 2025-04-22 RX ORDER — PALONOSETRON 0.05 MG/ML
0.25 INJECTION, SOLUTION INTRAVENOUS ONCE
Status: COMPLETED | OUTPATIENT
Start: 2025-04-22 | End: 2025-04-22

## 2025-04-22 RX ORDER — HYDROCORTISONE SODIUM SUCCINATE 100 MG/2ML
100 INJECTION INTRAMUSCULAR; INTRAVENOUS AS NEEDED
Status: DISCONTINUED | OUTPATIENT
Start: 2025-04-22 | End: 2025-04-23 | Stop reason: HOSPADM

## 2025-04-22 RX ORDER — SODIUM CHLORIDE 0.9 % (FLUSH) 0.9 %
20 SYRINGE (ML) INJECTION AS NEEDED
Status: DISCONTINUED | OUTPATIENT
Start: 2025-04-22 | End: 2025-04-23 | Stop reason: HOSPADM

## 2025-04-22 RX ORDER — DIPHENHYDRAMINE HYDROCHLORIDE 50 MG/ML
50 INJECTION, SOLUTION INTRAMUSCULAR; INTRAVENOUS AS NEEDED
Status: DISCONTINUED | OUTPATIENT
Start: 2025-04-22 | End: 2025-04-23 | Stop reason: HOSPADM

## 2025-04-22 RX ORDER — FAMOTIDINE 10 MG/ML
20 INJECTION, SOLUTION INTRAVENOUS AS NEEDED
Status: CANCELLED | OUTPATIENT
Start: 2025-04-22

## 2025-04-22 RX ORDER — DIPHENHYDRAMINE HYDROCHLORIDE 50 MG/ML
50 INJECTION, SOLUTION INTRAMUSCULAR; INTRAVENOUS AS NEEDED
Status: CANCELLED | OUTPATIENT
Start: 2025-04-22

## 2025-04-22 RX ORDER — HEPARIN SODIUM (PORCINE) LOCK FLUSH IV SOLN 100 UNIT/ML 100 UNIT/ML
500 SOLUTION INTRAVENOUS AS NEEDED
Status: DISCONTINUED | OUTPATIENT
Start: 2025-04-22 | End: 2025-04-23 | Stop reason: HOSPADM

## 2025-04-22 RX ORDER — FAMOTIDINE 10 MG/ML
20 INJECTION, SOLUTION INTRAVENOUS AS NEEDED
Status: DISCONTINUED | OUTPATIENT
Start: 2025-04-22 | End: 2025-04-23 | Stop reason: HOSPADM

## 2025-04-22 RX ORDER — ATROPINE SULFATE 1 MG/ML
0.25 INJECTION, SOLUTION INTRAMUSCULAR; INTRAVENOUS; SUBCUTANEOUS
Status: CANCELLED | OUTPATIENT
Start: 2025-04-22

## 2025-04-22 RX ORDER — HYDROCORTISONE SODIUM SUCCINATE 100 MG/2ML
100 INJECTION INTRAMUSCULAR; INTRAVENOUS AS NEEDED
Status: CANCELLED | OUTPATIENT
Start: 2025-04-22

## 2025-04-22 RX ORDER — PALONOSETRON 0.05 MG/ML
0.25 INJECTION, SOLUTION INTRAVENOUS ONCE
Status: CANCELLED | OUTPATIENT
Start: 2025-04-22

## 2025-04-22 RX ORDER — DEXTROSE MONOHYDRATE 50 MG/ML
20 INJECTION, SOLUTION INTRAVENOUS ONCE
Status: COMPLETED | OUTPATIENT
Start: 2025-04-22 | End: 2025-04-22

## 2025-04-22 RX ADMIN — FLUOROURACIL 3900 MG: 50 INJECTION, SOLUTION INTRAVENOUS at 14:00

## 2025-04-22 RX ADMIN — DEXAMETHASONE SODIUM PHOSPHATE 12 MG: 4 INJECTION, SOLUTION INTRA-ARTICULAR; INTRALESIONAL; INTRAMUSCULAR; INTRAVENOUS; SOFT TISSUE at 09:00

## 2025-04-22 RX ADMIN — LEUCOVORIN CALCIUM 660 MG: 350 INJECTION, POWDER, LYOPHILIZED, FOR SOLUTION INTRAMUSCULAR; INTRAVENOUS at 11:49

## 2025-04-22 RX ADMIN — DEXTROSE MONOHYDRATE 140 MG: 50 INJECTION, SOLUTION INTRAVENOUS at 09:41

## 2025-04-22 RX ADMIN — DEXTROSE MONOHYDRATE 20 ML/HR: 50 INJECTION, SOLUTION INTRAVENOUS at 08:57

## 2025-04-22 RX ADMIN — IRINOTECAN HYDROCHLORIDE 205 MG: 20 INJECTION, SOLUTION INTRAVENOUS at 12:21

## 2025-04-22 RX ADMIN — Medication 20 ML: at 08:00

## 2025-04-22 RX ADMIN — PALONOSETRON HYDROCHLORIDE 0.25 MG: 0.25 INJECTION, SOLUTION INTRAVENOUS at 09:01

## 2025-04-22 RX ADMIN — ATROPINE SULFATE 0.25 MG: 0.4 INJECTION, SOLUTION INTRAVENOUS at 13:25

## 2025-04-22 NOTE — PROGRESS NOTES
"Outpatient Nutrition Oncology Follow Up    Patient Name: Josue Stern  YOB: 1955  MRN: [id number]    Recommendation(s):   Small, frequent meals of low-moderate fat foods (high kcal, high protein).  Adequate fluids.  Trial of pancreatic enzymes to see if helps with new GI symptoms.      Reason for Consult:  UWL, new GI symptoms    Diagnosis: malignant neoplasm of head of pancreas       Current Treatment:   mFOLFIRINOX    Today's Weight:   64.1 kg    Weight Change:   2.9% wt decline in 2 weeks (considered significant)  4.3% wt decline in 3 months (considered mild-moderate wt decline)    Nutrition-related concerns:   reports new symptoms of orange-colored stools, more gas formation, a greasy area on bed sheets (seen every morning) after experiencing gas during the night, UWL, recent \"diarrhea\" per pt (thought it was a stomach virus)    Current PO intake:  consuming a variety of foods and occasionally fatty or greasy foods (pt is a  at [employer] [employer] and says his staff members always send him home with a plate of food     Current Nutrition Supplement intake:  drinks a variety of ONS (Premier protein shakes, Boost Plus) but not consistently    Consult or Intervention:   Pt here for infusion.  He had questions regarding new GI symptoms, as noted above.  S/P Whipple surgery 2/24/25.  Per medical record, unable to find that he has been prescribed pancreatic enzymes.  Pt reports the last time he went to see his surgeon in [place] he was given samples of a medication to treat diarrhea.  Pt was previously experiencing diarrhea and thought it was a stomach virus.  He ran out of the samples given.  Unable to find what samples he was given in his record.  Discussed he may need pancreatic enzymes.  Explained Oncology RD will communicate his symptoms to the Medical Oncologist.  If unable to order pancreatic enzymes, Oncology RD will call pt and encourage him to call his surgeon.  Otherwise pt " reports an excellent appetite.  For now, discouraged greasy or excessively fatty foods until further intervention can be given.     Nutrition Diagnosis: Unintentional weight loss related to GI dysfunction as evidenced by physiological causes increasing nutrient needs., hypermetabolic state., patient report., and symptoms of steatorrhea.    Plan: Will follow up per oncology nutrition protocol

## 2025-04-24 ENCOUNTER — HOSPITAL ENCOUNTER (OUTPATIENT)
Dept: ONCOLOGY | Facility: HOSPITAL | Age: 70
Discharge: HOME | End: 2025-04-24
Payer: MEDICARE

## 2025-04-24 DIAGNOSIS — C25.0 MALIGNANT NEOPLASM OF HEAD OF PANCREAS: Primary | ICD-10-CM

## 2025-04-24 PROCEDURE — 25010000002 HEPARIN LOCK FLUSH PER 10 UNITS: Performed by: INTERNAL MEDICINE

## 2025-04-24 PROCEDURE — 25010000002 PEGFILGRASTIM 6 MG/0.6ML PREFILLED SYRINGE KIT: Performed by: INTERNAL MEDICINE

## 2025-04-24 PROCEDURE — 96377 APPLICATON ON-BODY INJECTOR: CPT

## 2025-04-24 RX ORDER — HEPARIN SODIUM (PORCINE) LOCK FLUSH IV SOLN 100 UNIT/ML 100 UNIT/ML
500 SOLUTION INTRAVENOUS AS NEEDED
Status: DISCONTINUED | OUTPATIENT
Start: 2025-04-24 | End: 2025-04-26 | Stop reason: HOSPADM

## 2025-04-24 RX ORDER — SODIUM CHLORIDE 0.9 % (FLUSH) 0.9 %
20 SYRINGE (ML) INJECTION AS NEEDED
OUTPATIENT
Start: 2025-04-24

## 2025-04-24 RX ORDER — HEPARIN SODIUM (PORCINE) LOCK FLUSH IV SOLN 100 UNIT/ML 100 UNIT/ML
500 SOLUTION INTRAVENOUS AS NEEDED
OUTPATIENT
Start: 2025-04-24

## 2025-04-24 RX ORDER — SODIUM CHLORIDE 0.9 % (FLUSH) 0.9 %
20 SYRINGE (ML) INJECTION AS NEEDED
Status: DISCONTINUED | OUTPATIENT
Start: 2025-04-24 | End: 2025-04-26 | Stop reason: HOSPADM

## 2025-04-24 RX ADMIN — HEPARIN 500 UNITS: 100 SYRINGE at 13:47

## 2025-04-24 RX ADMIN — Medication 20 ML: at 13:46

## 2025-04-24 RX ADMIN — PEGFILGRASTIM 6 MG: KIT SUBCUTANEOUS at 13:51

## 2025-05-06 ENCOUNTER — DOCUMENTATION (OUTPATIENT)
Dept: ONCOLOGY | Facility: HOSPITAL | Age: 70
End: 2025-05-06
Payer: MEDICARE

## 2025-05-06 ENCOUNTER — HOSPITAL ENCOUNTER (OUTPATIENT)
Dept: ONCOLOGY | Facility: HOSPITAL | Age: 70
Discharge: HOME | End: 2025-05-06
Payer: MEDICARE

## 2025-05-06 ENCOUNTER — OFFICE VISIT (OUTPATIENT)
Dept: ONCOLOGY | Facility: HOSPITAL | Age: 70
End: 2025-05-06
Payer: MEDICARE

## 2025-05-06 VITALS
OXYGEN SATURATION: 97 % | DIASTOLIC BLOOD PRESSURE: 96 MMHG | TEMPERATURE: 98 F | HEIGHT: 66 IN | RESPIRATION RATE: 18 BRPM | BODY MASS INDEX: 22.18 KG/M2 | WEIGHT: 138 LBS | HEART RATE: 62 BPM | SYSTOLIC BLOOD PRESSURE: 157 MMHG

## 2025-05-06 VITALS — TEMPERATURE: 97.3 F

## 2025-05-06 DIAGNOSIS — K90.9 STEATORRHEA: ICD-10-CM

## 2025-05-06 DIAGNOSIS — C25.0 MALIGNANT NEOPLASM OF HEAD OF PANCREAS: Primary | ICD-10-CM

## 2025-05-06 LAB
ALBUMIN SERPL-MCNC: 3.3 G/DL (ref 3.5–5.2)
ALBUMIN/GLOB SERPL: 1.3 G/DL
ALP SERPL-CCNC: 221 U/L (ref 39–117)
ALT SERPL W P-5'-P-CCNC: 24 U/L (ref 1–41)
ANION GAP SERPL CALCULATED.3IONS-SCNC: 8.3 MMOL/L (ref 5–15)
AST SERPL-CCNC: 46 U/L (ref 1–40)
BILIRUB SERPL-MCNC: 0.3 MG/DL (ref 0–1.2)
BUN SERPL-MCNC: 12 MG/DL (ref 8–23)
BUN/CREAT SERPL: 12.9 (ref 7–25)
CALCIUM SPEC-SCNC: 8.1 MG/DL (ref 8.6–10.5)
CHLORIDE SERPL-SCNC: 107 MMOL/L (ref 98–107)
CO2 SERPL-SCNC: 24.7 MMOL/L (ref 22–29)
CREAT SERPL-MCNC: 0.93 MG/DL (ref 0.76–1.27)
DEPRECATED RDW RBC AUTO: 45.6 FL (ref 37–54)
EGFRCR SERPLBLD CKD-EPI 2021: 88.3 ML/MIN/1.73
ERYTHROCYTE [DISTWIDTH] IN BLOOD BY AUTOMATED COUNT: 14.5 % (ref 12.3–15.4)
GLOBULIN UR ELPH-MCNC: 2.5 GM/DL
GLUCOSE SERPL-MCNC: 116 MG/DL (ref 65–99)
HCT VFR BLD AUTO: 35.5 % (ref 37.5–51)
HGB BLD-MCNC: 11.6 G/DL (ref 13–17.7)
LYMPHOCYTES # BLD MANUAL: 1.05 10*3/MM3 (ref 0.7–3.1)
LYMPHOCYTES NFR BLD MANUAL: 6 % (ref 5–12)
MCH RBC QN AUTO: 28.4 PG (ref 26.6–33)
MCHC RBC AUTO-ENTMCNC: 32.7 G/DL (ref 31.5–35.7)
MCV RBC AUTO: 86.8 FL (ref 79–97)
MONOCYTES # BLD: 0.79 10*3/MM3 (ref 0.1–0.9)
NEUTROPHILS # BLD AUTO: 11.29 10*3/MM3 (ref 1.7–7)
NEUTROPHILS NFR BLD MANUAL: 82 % (ref 42.7–76)
NEUTS BAND NFR BLD MANUAL: 4 % (ref 0–5)
PLATELET # BLD AUTO: 199 10*3/MM3 (ref 140–450)
PMV BLD AUTO: 9.5 FL (ref 6–12)
POTASSIUM SERPL-SCNC: 4.1 MMOL/L (ref 3.5–5.2)
PROT SERPL-MCNC: 5.8 G/DL (ref 6–8.5)
RBC # BLD AUTO: 4.09 10*6/MM3 (ref 4.14–5.8)
RBC MORPH BLD: NORMAL
SCAN SLIDE: NORMAL
SMALL PLATELETS BLD QL SMEAR: ADEQUATE
SODIUM SERPL-SCNC: 140 MMOL/L (ref 136–145)
TOXIC GRANULATION: ABNORMAL
VARIANT LYMPHS NFR BLD MANUAL: 8 % (ref 19.6–45.3)
WBC NRBC COR # BLD AUTO: 13.13 10*3/MM3 (ref 3.4–10.8)

## 2025-05-06 PROCEDURE — 96415 CHEMO IV INFUSION ADDL HR: CPT

## 2025-05-06 PROCEDURE — 25010000002 FLUOROURACIL PER 500 MG: Performed by: INTERNAL MEDICINE

## 2025-05-06 PROCEDURE — 96368 THER/DIAG CONCURRENT INF: CPT

## 2025-05-06 PROCEDURE — 25010000003 DEXTROSE 5 % SOLUTION 500 ML FLEX CONT: Performed by: INTERNAL MEDICINE

## 2025-05-06 PROCEDURE — 25010000003 DEXTROSE 5 % SOLUTION: Performed by: INTERNAL MEDICINE

## 2025-05-06 PROCEDURE — 25010000002 PALONOSETRON PER 25 MCG: Performed by: INTERNAL MEDICINE

## 2025-05-06 PROCEDURE — 96417 CHEMO IV INFUS EACH ADDL SEQ: CPT

## 2025-05-06 PROCEDURE — 25010000002 OXALIPLATIN PER 0.5 MG: Performed by: INTERNAL MEDICINE

## 2025-05-06 PROCEDURE — 85007 BL SMEAR W/DIFF WBC COUNT: CPT | Performed by: INTERNAL MEDICINE

## 2025-05-06 PROCEDURE — 25010000002 IRINOTECAN PER 20 MG: Performed by: INTERNAL MEDICINE

## 2025-05-06 PROCEDURE — 85025 COMPLETE CBC W/AUTO DIFF WBC: CPT | Performed by: INTERNAL MEDICINE

## 2025-05-06 PROCEDURE — G0498 CHEMO EXTEND IV INFUS W/PUMP: HCPCS

## 2025-05-06 PROCEDURE — 96413 CHEMO IV INFUSION 1 HR: CPT

## 2025-05-06 PROCEDURE — 25010000002 DEXAMETHASONE SODIUM PHOSPHATE 120 MG/30ML SOLUTION: Performed by: INTERNAL MEDICINE

## 2025-05-06 PROCEDURE — 96375 TX/PRO/DX INJ NEW DRUG ADDON: CPT

## 2025-05-06 PROCEDURE — 25810000003 SODIUM CHLORIDE 0.9 % SOLUTION 250 ML FLEX CONT: Performed by: INTERNAL MEDICINE

## 2025-05-06 PROCEDURE — 25010000002 LEUCOVORIN CALCIUM PER 50 MG: Performed by: INTERNAL MEDICINE

## 2025-05-06 PROCEDURE — 25010000003 DEXTROSE 5 % SOLUTION 250 ML FLEX CONT: Performed by: INTERNAL MEDICINE

## 2025-05-06 PROCEDURE — 80053 COMPREHEN METABOLIC PANEL: CPT | Performed by: INTERNAL MEDICINE

## 2025-05-06 RX ORDER — FAMOTIDINE 10 MG/ML
20 INJECTION, SOLUTION INTRAVENOUS AS NEEDED
Status: DISCONTINUED | OUTPATIENT
Start: 2025-05-06 | End: 2025-05-07 | Stop reason: HOSPADM

## 2025-05-06 RX ORDER — PALONOSETRON 0.05 MG/ML
0.25 INJECTION, SOLUTION INTRAVENOUS ONCE
Status: COMPLETED | OUTPATIENT
Start: 2025-05-06 | End: 2025-05-06

## 2025-05-06 RX ORDER — HYDROCORTISONE SODIUM SUCCINATE 100 MG/2ML
100 INJECTION INTRAMUSCULAR; INTRAVENOUS AS NEEDED
Status: CANCELLED | OUTPATIENT
Start: 2025-05-06

## 2025-05-06 RX ORDER — DIPHENHYDRAMINE HYDROCHLORIDE 50 MG/ML
50 INJECTION, SOLUTION INTRAMUSCULAR; INTRAVENOUS AS NEEDED
Status: DISCONTINUED | OUTPATIENT
Start: 2025-05-06 | End: 2025-05-07 | Stop reason: HOSPADM

## 2025-05-06 RX ORDER — SODIUM CHLORIDE 0.9 % (FLUSH) 0.9 %
20 SYRINGE (ML) INJECTION AS NEEDED
Status: DISCONTINUED | OUTPATIENT
Start: 2025-05-06 | End: 2025-05-07 | Stop reason: HOSPADM

## 2025-05-06 RX ORDER — DEXTROSE MONOHYDRATE 50 MG/ML
20 INJECTION, SOLUTION INTRAVENOUS ONCE
Status: COMPLETED | OUTPATIENT
Start: 2025-05-06 | End: 2025-05-06

## 2025-05-06 RX ORDER — PALONOSETRON 0.05 MG/ML
0.25 INJECTION, SOLUTION INTRAVENOUS ONCE
Status: CANCELLED | OUTPATIENT
Start: 2025-05-06

## 2025-05-06 RX ORDER — HEPARIN SODIUM (PORCINE) LOCK FLUSH IV SOLN 100 UNIT/ML 100 UNIT/ML
500 SOLUTION INTRAVENOUS AS NEEDED
Status: DISCONTINUED | OUTPATIENT
Start: 2025-05-06 | End: 2025-05-07 | Stop reason: HOSPADM

## 2025-05-06 RX ORDER — HYDROCORTISONE SODIUM SUCCINATE 100 MG/2ML
100 INJECTION INTRAMUSCULAR; INTRAVENOUS AS NEEDED
Status: DISCONTINUED | OUTPATIENT
Start: 2025-05-06 | End: 2025-05-07 | Stop reason: HOSPADM

## 2025-05-06 RX ORDER — DEXTROSE MONOHYDRATE 50 MG/ML
20 INJECTION, SOLUTION INTRAVENOUS ONCE
Status: CANCELLED | OUTPATIENT
Start: 2025-05-06

## 2025-05-06 RX ORDER — SODIUM CHLORIDE 0.9 % (FLUSH) 0.9 %
20 SYRINGE (ML) INJECTION AS NEEDED
Status: CANCELLED | OUTPATIENT
Start: 2025-05-06

## 2025-05-06 RX ORDER — HEPARIN SODIUM (PORCINE) LOCK FLUSH IV SOLN 100 UNIT/ML 100 UNIT/ML
500 SOLUTION INTRAVENOUS AS NEEDED
Status: CANCELLED | OUTPATIENT
Start: 2025-05-08

## 2025-05-06 RX ORDER — DIPHENHYDRAMINE HYDROCHLORIDE 50 MG/ML
50 INJECTION, SOLUTION INTRAMUSCULAR; INTRAVENOUS AS NEEDED
Status: CANCELLED | OUTPATIENT
Start: 2025-05-06

## 2025-05-06 RX ORDER — FAMOTIDINE 10 MG/ML
20 INJECTION, SOLUTION INTRAVENOUS AS NEEDED
Status: CANCELLED | OUTPATIENT
Start: 2025-05-06

## 2025-05-06 RX ORDER — ATROPINE SULFATE 1 MG/ML
0.25 INJECTION, SOLUTION INTRAMUSCULAR; INTRAVENOUS; SUBCUTANEOUS
Status: CANCELLED | OUTPATIENT
Start: 2025-05-06

## 2025-05-06 RX ADMIN — LEUCOVORIN CALCIUM 660 MG: 350 INJECTION, POWDER, LYOPHILIZED, FOR SOLUTION INTRAMUSCULAR; INTRAVENOUS at 12:08

## 2025-05-06 RX ADMIN — IRINOTECAN HYDROCHLORIDE 205 MG: 20 INJECTION, SOLUTION INTRAVENOUS at 12:43

## 2025-05-06 RX ADMIN — ATROPINE SULFATE 0.25 MG: 0.4 INJECTION, SOLUTION INTRAVENOUS at 12:46

## 2025-05-06 RX ADMIN — PALONOSETRON HYDROCHLORIDE 0.25 MG: 0.25 INJECTION, SOLUTION INTRAVENOUS at 09:32

## 2025-05-06 RX ADMIN — DEXTROSE MONOHYDRATE 20 ML/HR: 50 INJECTION, SOLUTION INTRAVENOUS at 09:30

## 2025-05-06 RX ADMIN — OXALIPLATIN 140 MG: 5 INJECTION, SOLUTION INTRAVENOUS at 10:01

## 2025-05-06 RX ADMIN — DEXAMETHASONE SODIUM PHOSPHATE 12 MG: 4 INJECTION, SOLUTION INTRA-ARTICULAR; INTRALESIONAL; INTRAMUSCULAR; INTRAVENOUS; SOFT TISSUE at 09:34

## 2025-05-06 RX ADMIN — FLUOROURACIL 3930 MG: 50 INJECTION, SOLUTION INTRAVENOUS at 14:24

## 2025-05-06 RX ADMIN — Medication 20 ML: at 07:52

## 2025-05-06 NOTE — PROGRESS NOTES
Chief Complaint  Malignant neoplasm of head of pancreas    Forrest Duong MD Smith, Forrest Leon MD    Subjective          Josue Stern presents to [facility] for ongoing treatment of his pancreatic cancer.  He is on adjuvant FOLFIRINOX.  He is due for cycle 10.  He notes a little more fatigue with the last cycle.  He is also not eating well.  He feels like he is getting enough nutrition and.  He is having greasy stools.  Creon was prescribed to him but he did not pick it up.  He denies issues from his Port-A-Cath.  No new masses or adenopathy.  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    Oncology/Hematology History   Malignant neoplasm of head of pancreas   9/5/2024 Initial Diagnosis    Malignant neoplasm of pancreas     9/5/2024 -  Chemotherapy    OP CENTRAL VENOUS ACCESS DEVICE Access, Care, and Maintenance (CVAD)     9/17/2024 -  Chemotherapy    OP PANCREATIC mFOLFIRINOX (OXALIplatin / Leucovorin / Irinotecan / Fluorouracil CIV)     4/8/2025 Cancer Staged    Staging form: Exocrine Pancreas, AJCC 8th Edition  - Pathologic: Stage IIB (ypT2, ypN1, cM0) - Signed by Gene Blanchard MD on 4/8/2025           Current Outpatient Medications on File Prior to Visit   Medication Sig Dispense Refill    albuterol sulfate  (90 Base) MCG/ACT inhaler Inhale 2 puffs Every 4 (Four) Hours As Needed for Wheezing. 18 g 0    amLODIPine (Norvasc) 5 MG tablet Take 1 tablet by mouth Daily.      amoxicillin (AMOXIL) 500 MG capsule Take 1 capsule by mouth 3 times a day. (Patient not taking: Reported on 5/6/2025)      azithromycin (Zithromax Z-Tomasz) 250 MG tablet Take 2 tablets by mouth on day 1, then 1 tablet daily on days 2-5 (Patient not taking: Reported on 5/6/2025) 6 tablet 0    buprenorphine-naloxone (Suboxone) 8-2 MG film film Place 1 film under the tongue 2 (Two) Times a Day.      busPIRone (BUSPAR) 15 MG tablet Take 1 tablet by mouth 3  (Three) Times a Day.      cetirizine (ZyrTEC Allergy) 10 MG tablet Take 1 tablet by mouth Daily. (Patient not taking: Reported on 5/6/2025)      DULoxetine (CYMBALTA) 30 MG capsule TAKE 1 CAPSULE BY MOUTH DAILY 30 capsule 1    gabapentin (NEURONTIN) 800 MG tablet Take 1 tablet by mouth 3 (Three) Times a Day.      ibuprofen (ADVIL,MOTRIN) 800 MG tablet Take 1 tablet by mouth Every 8 (Eight) Hours As Needed for Mild Pain. 30 tablet 1    lidocaine-prilocaine (EMLA) 2.5-2.5 % cream Apply 1 Application topically to the appropriate area as directed As Needed for Injection Site Pain. 30 g 1    ondansetron (ZOFRAN) 8 MG tablet Take 1 tablet by mouth 3 (Three) Times a Day As Needed for Nausea or Vomiting. 30 tablet 5    pancrelipase, Lip-Prot-Amyl, (CREON) 02415-49510 units capsule delayed-release particles capsule Take 1 capsule by mouth 3 (Three) Times a Day With Meals. 90 capsule 1    prazosin (MINIPRESS) 1 MG capsule Take 1 capsule by mouth Every Night. (Patient not taking: Reported on 5/6/2025)      predniSONE (DELTASONE) 50 MG tablet Take 1 tablet by mouth Daily. (Patient not taking: Reported on 5/6/2025) 5 tablet 0    propranolol LA (Inderal LA) 60 MG 24 hr capsule Take 1 capsule by mouth Daily. (Patient not taking: Reported on 5/6/2025)       Current Facility-Administered Medications on File Prior to Visit   Medication Dose Route Frequency Provider Last Rate Last Admin    [COMPLETED] dextrose (D5W) 5 % infusion  20 mL/hr Intravenous Once Gene Blanchard MD   Stopped at 05/06/25 1423    [COMPLETED] irinotecan (CAMPTOSAR) 205 mg in dextrose (D5W) 5 % 510.3 mL chemo IVPB  112.5 mg/m2 (Treatment Plan Recorded) Intravenous Once Gene Blanchard MD   Stopped at 05/06/25 1413    [COMPLETED] leucovorin 660 mg in dextrose (D5W) 5 % 283 mL IVPB  360 mg/m2 (Treatment Plan Recorded) Intravenous Once Gene Blanchard MD   Stopped at 05/06/25 1408    [COMPLETED] OXALIplatin (ELOXATIN) 140 mg in dextrose (D5W) 5 % 278 mL chemo  IVPB  76.5 mg/m2 (Treatment Plan Recorded) Intravenous Once Gene Blanchard MD   Stopped at 05/06/25 1201    [DISCONTINUED] atropine sulfate injection 0.25 mg  0.25 mg Intravenous Q15 Min PRN Gene Blanchard MD   0.25 mg at 05/06/25 1246    [DISCONTINUED] diphenhydrAMINE (BENADRYL) injection 50 mg  50 mg Intravenous PRN Gene Blanchard MD        [DISCONTINUED] famotidine (PEPCID) injection 20 mg  20 mg Intravenous PRN Gene Blanchard MD        [DISCONTINUED] fluorouracil (ADRUCIL) 3,930 mg in sodium chloride 0.9 % 138 mL chemo infusion - FOR HOME USE  2,160 mg/m2 (Treatment Plan Recorded) Intravenous Once Gene Blanchard MD   3,930 mg at 05/06/25 1424    [DISCONTINUED] heparin injection 500 Units  500 Units Intravenous PRN Gene Blanchard MD        [DISCONTINUED] Hydrocortisone Sod Suc (PF) (Solu-CORTEF) injection 100 mg  100 mg Intravenous PRN Gene Blanchard MD        [DISCONTINUED] sodium chloride 0.9 % flush 20 mL  20 mL Intravenous PRN Gene Blanchard MD   20 mL at 05/06/25 0752       Allergies   Allergen Reactions    Aspirin Nausea Only     Past Medical History:   Diagnosis Date    Allergies     Asthma     GERD (gastroesophageal reflux disease)     Hypertension     Malignant neoplasm of pancreas 09/05/2024     Past Surgical History:   Procedure Laterality Date    HERNIA REPAIR       Social History     Socioeconomic History    Marital status: Single   Tobacco Use    Smoking status: Every Day     Current packs/day: 0.50     Types: Cigarettes    Smokeless tobacco: Never   Vaping Use    Vaping status: Every Day   Substance and Sexual Activity    Alcohol use: Never    Drug use: Never    Sexual activity: Defer     History reviewed. No pertinent family history.    Objective   Physical Exam  Vitals reviewed. Exam conducted with a chaperone present.   Cardiovascular:      Rate and Rhythm: Normal rate and regular rhythm.      Heart sounds: Normal heart sounds. No murmur heard.     No gallop.   Pulmonary:  "     Effort: Pulmonary effort is normal.      Breath sounds: Normal breath sounds.      Comments: Port-A-Cath  Abdominal:      General: Bowel sounds are normal.   Lymphadenopathy:      Cervical: No cervical adenopathy.   Psychiatric:         Mood and Affect: Mood normal.         Behavior: Behavior normal.         Vitals:    05/06/25 0835   BP: 157/96   Pulse: 62   Resp: 18   Temp: 98 °F (36.7 °C)   TempSrc: Temporal   SpO2: 97%   Weight: 62.6 kg (138 lb)   Height: 167.6 cm (65.98\")   PainSc: 6    PainLoc: Hip  Comment: right side     ECOG score: 0         PHQ-9 Total Score:                    Result Review :   The following data was reviewed by: Gene Blanchard MD on 05/06/2025:  Lab Results   Component Value Date    HGB 11.6 (L) 05/06/2025    HCT 35.5 (L) 05/06/2025    MCV 86.8 05/06/2025     05/06/2025    WBC 13.13 (H) 05/06/2025    NEUTROABS 11.29 (H) 05/06/2025    LYMPHSABS 1.82 04/22/2025    MONOSABS 0.49 04/22/2025    EOSABS 0.10 04/22/2025    BASOSABS 0.02 04/22/2025     Lab Results   Component Value Date    GLUCOSE 116 (H) 05/06/2025    BUN 12 05/06/2025    CREATININE 0.93 05/06/2025     05/06/2025    K 4.1 05/06/2025     05/06/2025    CO2 24.7 05/06/2025    CALCIUM 8.1 (L) 05/06/2025    PROTEINTOT 5.8 (L) 05/06/2025    ALBUMIN 3.3 (L) 05/06/2025    BILITOT 0.3 05/06/2025    ALKPHOS 221 (H) 05/06/2025    AST 46 (H) 05/06/2025    ALT 24 05/06/2025     Lab Results   Component Value Date    MG 1.7 12/10/2021    PHOS 3.4 12/10/2021    FREET4 1.74 06/30/2020    TSH 0.256 (L) 12/06/2021     Lab Results   Component Value Date    IRON 15 (L) 12/08/2021    LABIRON 7 (L) 12/08/2021    TRANSFERRIN 150 (L) 12/08/2021    TIBC 224 (L) 12/08/2021     Lab Results   Component Value Date    FERRITIN 75 12/21/2020    NUGDBWWG51 1,022 (H) 12/08/2021    FOLATE 10.90 12/08/2021     Lab Results   Component Value Date     168.0 (H) 04/03/2025             Assessment and Plan    Diagnoses and all orders for " this visit:    1. Malignant neoplasm of head of pancreas (Primary)  Assessment & Plan:  Patient is on adjuvant FOLFIRINOX.  He is due for cycle 10.  He notes decreased appetite but feels like he is getting adequate nutrition.  He continues to have neuropathy in his feet which is unchanged from previous.  Blood work is notable for mild anemia related to the regimen.  Remainder of his lab work is adequate for treatment.  Proceed with cycle 10 as planned.  I will see him back for cycle 11-day 1 with lab work prior to monitor for toxicities.    Orders:  -     Infusion Appointment Request 10; Future    2. 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  Patient prescribed Creon but he has not been taking it.  I resent the prescription.  Encouraged him to be compliant with the regimen.  He will follow-up with dietitian for nutrition recommendations.      Other orders  -     Cancel: dextrose (D5W) 5 % infusion  -     Cancel: palonosetron (ALOXI) injection 0.25 mg  -     Cancel: dexAMETHasone (DECADRON) 12 mg in sodium chloride 0.9 % IVPB  -     Cancel: OXALIplatin (ELOXATIN) 140 mg in dextrose (D5W) 5 % 278 mL chemo IVPB  -     Cancel: leucovorin 660 mg in dextrose (D5W) 5 % 316 mL IVPB  -     Cancel: irinotecan (CAMPTOSAR) 205 mg in dextrose (D5W) 5 % 510.3 mL chemo IVPB  -     Cancel: atropine injection 0.25 mg  -     Cancel: fluorouracil (ADRUCIL) 3,930 mg in sodium chloride 0.9 % 78.6 mL chemo infusion - FOR HOME USE  -     Cancel: Hydrocortisone Sod Suc (PF) (Solu-CORTEF) injection 100 mg  -     Cancel: diphenhydrAMINE (BENADRYL) injection 50 mg  -     Cancel: famotidine (PEPCID) injection 20 mg  -     pegfilgrastim (NEULASTA ONPRO) on-body injector 6 mg            Patient Follow Up: Cycle 11 day 1    Patient was given instructions and counseling regarding his condition or for health maintenance advice. Please see specific information pulled into the AVS if  appropriate.     Gene Blanchard MD    5/7/2025

## 2025-05-06 NOTE — PROGRESS NOTES
Outpatient Nutrition Oncology Follow Up    Patient Name: Josue Stern  YOB: 1955  MRN: [id number]      Reason for Consult:  F/U from previous nutrition encounter    Diagnosis:  pancreatic Ca       Current Treatment:  mFOLFIRINOX    Today's Weight:  62.6 kg    Weight Change:   5.1% wt decline in the past month (significant)  6.7% wt decline in 3 months     Nutrition-related concerns:  ongoing symptoms of steatorrhea, UWL    Current PO intake: consuming a variety of foods and occasionally fatty or greasy foods (pt is a  at [employer] and says his staff members always send him home with a plate of food      Current Nutrition Supplement intake: drinks a variety of ONS (Premier protein shakes, Boost Plus) but not consistently     Consult or Intervention:  Saw pt during his infusion today.  He is experiencing ongoing wt decline, which is significant.  Pt has not picked up the Creon Rx and seemed unsure as to what this medication is for.  Explained the purpose of the Creon.  Provided pt with the pharmacy this was called to, the phone# of the pharmacy and address.  Encouraged pt to take as prescribed, and explained instructions are to take with each meal 3 X day.  Pt v/u.    Nutrition Diagnosis: Unintentional weight loss related to increased nutrient needs due to catabolic disease as evidenced by physiological causes increasing nutrient needs., hypermetabolic state., and GI dysfunction.    Plan: Will follow up per oncology nutrition protocol

## 2025-05-07 PROBLEM — K90.9 STEATORRHEA: Status: ACTIVE | Noted: 2025-05-07

## 2025-05-07 NOTE — ASSESSMENT & PLAN NOTE
Patient is on adjuvant FOLFIRINOX.  He is due for cycle 10.  He notes decreased appetite but feels like he is getting adequate nutrition.  He continues to have neuropathy in his feet which is unchanged from previous.  Blood work is notable for mild anemia related to the regimen.  Remainder of his lab work is adequate for treatment.  Proceed with cycle 10 as planned.  I will see him back for cycle 11-day 1 with lab work prior to monitor for toxicities.

## 2025-05-07 NOTE — ASSESSMENT & PLAN NOTE
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  Patient prescribed Creon but he has not been taking it.  I resent the prescription.  Encouraged him to be compliant with the regimen.  He will follow-up with dietitian for nutrition recommendations.

## 2025-05-08 ENCOUNTER — TELEPHONE (OUTPATIENT)
Dept: ONCOLOGY | Facility: HOSPITAL | Age: 70
End: 2025-05-08
Payer: MEDICARE

## 2025-05-08 ENCOUNTER — HOSPITAL ENCOUNTER (OUTPATIENT)
Dept: ONCOLOGY | Facility: HOSPITAL | Age: 70
Discharge: HOME | End: 2025-05-08
Admitting: INTERNAL MEDICINE
Payer: MEDICARE

## 2025-05-08 VITALS
HEART RATE: 70 BPM | DIASTOLIC BLOOD PRESSURE: 92 MMHG | RESPIRATION RATE: 18 BRPM | SYSTOLIC BLOOD PRESSURE: 163 MMHG | OXYGEN SATURATION: 98 % | TEMPERATURE: 97 F

## 2025-05-08 DIAGNOSIS — C25.0 MALIGNANT NEOPLASM OF HEAD OF PANCREAS: Primary | ICD-10-CM

## 2025-05-08 PROCEDURE — 25010000002 PEGFILGRASTIM 6 MG/0.6ML PREFILLED SYRINGE KIT: Performed by: INTERNAL MEDICINE

## 2025-05-08 PROCEDURE — 25010000002 HEPARIN LOCK FLUSH PER 10 UNITS: Performed by: INTERNAL MEDICINE

## 2025-05-08 PROCEDURE — 96377 APPLICATON ON-BODY INJECTOR: CPT

## 2025-05-08 RX ORDER — HEPARIN SODIUM (PORCINE) LOCK FLUSH IV SOLN 100 UNIT/ML 100 UNIT/ML
500 SOLUTION INTRAVENOUS AS NEEDED
OUTPATIENT
Start: 2025-05-08

## 2025-05-08 RX ORDER — SODIUM CHLORIDE 0.9 % (FLUSH) 0.9 %
20 SYRINGE (ML) INJECTION AS NEEDED
Status: DISCONTINUED | OUTPATIENT
Start: 2025-05-08 | End: 2025-05-09 | Stop reason: HOSPADM

## 2025-05-08 RX ORDER — SODIUM CHLORIDE 0.9 % (FLUSH) 0.9 %
20 SYRINGE (ML) INJECTION AS NEEDED
OUTPATIENT
Start: 2025-05-08

## 2025-05-08 RX ORDER — HEPARIN SODIUM (PORCINE) LOCK FLUSH IV SOLN 100 UNIT/ML 100 UNIT/ML
500 SOLUTION INTRAVENOUS AS NEEDED
Status: DISCONTINUED | OUTPATIENT
Start: 2025-05-08 | End: 2025-05-09 | Stop reason: HOSPADM

## 2025-05-08 RX ADMIN — Medication 20 ML: at 15:09

## 2025-05-08 RX ADMIN — HEPARIN 500 UNITS: 100 SYRINGE at 15:09

## 2025-05-08 RX ADMIN — PEGFILGRASTIM 6 MG: KIT SUBCUTANEOUS at 15:10

## 2025-05-08 NOTE — TELEPHONE ENCOUNTER
Received a return call from Alena that she spoke with Mr. Stern and he is on his way up to the [facility] now. OSW notified the infusion nurse. OSW support remains available.

## 2025-05-08 NOTE — TELEPHONE ENCOUNTER
OSW received a notification from the infusion nurse that Mr. Stern did not show up for his chemotherapy unhook appointment this afternoon. They've tried reaching him without success. OSW contacted Mr. Stern's sister, Alena. She reports she spoke with him yesterday and he was planning to take his car somewhere this morning to have it looked at and suggests maybe it is taking longer than he expected. She will try to reach him herself and will contact OSW back with an update on whether or not she was able to make contact with him. Advised if she is unable to reach him, we will need to request a welfare check given the circumstances. She v/u.

## 2025-05-08 NOTE — TELEPHONE ENCOUNTER
LEFT MESSAGE FOR PATIENT IN REGARDS TO HIS UNHOOK APPOINTMENT FOR TODAY, ASKED FOR PATIENT TO CALL OFFICE BACK TO LET US KNOW WHEN HE WAS COMING IN TO GET HOME INFUSION PUMP DISCONNECTED.

## 2025-05-20 ENCOUNTER — DOCUMENTATION (OUTPATIENT)
Dept: ONCOLOGY | Facility: HOSPITAL | Age: 70
End: 2025-05-20
Payer: MEDICARE

## 2025-05-20 ENCOUNTER — OFFICE VISIT (OUTPATIENT)
Dept: ONCOLOGY | Facility: HOSPITAL | Age: 70
End: 2025-05-20
Payer: MEDICARE

## 2025-05-20 ENCOUNTER — HOSPITAL ENCOUNTER (OUTPATIENT)
Dept: ONCOLOGY | Facility: HOSPITAL | Age: 70
Discharge: HOME | End: 2025-05-20
Payer: MEDICARE

## 2025-05-20 VITALS
HEART RATE: 53 BPM | SYSTOLIC BLOOD PRESSURE: 137 MMHG | TEMPERATURE: 97.8 F | RESPIRATION RATE: 18 BRPM | HEIGHT: 66 IN | BODY MASS INDEX: 22.18 KG/M2 | OXYGEN SATURATION: 98 % | WEIGHT: 138 LBS | DIASTOLIC BLOOD PRESSURE: 77 MMHG

## 2025-05-20 DIAGNOSIS — C25.0 MALIGNANT NEOPLASM OF HEAD OF PANCREAS: Primary | ICD-10-CM

## 2025-05-20 DIAGNOSIS — C25.0 MALIGNANT NEOPLASM OF HEAD OF PANCREAS: ICD-10-CM

## 2025-05-20 DIAGNOSIS — R63.0 DECREASED APPETITE: ICD-10-CM

## 2025-05-20 LAB
ALBUMIN SERPL-MCNC: 3.1 G/DL (ref 3.5–5.2)
ALBUMIN/GLOB SERPL: 1.3 G/DL
ALP SERPL-CCNC: 235 U/L (ref 39–117)
ALT SERPL W P-5'-P-CCNC: 20 U/L (ref 1–41)
ANION GAP SERPL CALCULATED.3IONS-SCNC: 9.5 MMOL/L (ref 5–15)
AST SERPL-CCNC: 27 U/L (ref 1–40)
BASOPHILS # BLD AUTO: 0.07 10*3/MM3 (ref 0–0.2)
BASOPHILS NFR BLD AUTO: 0.6 % (ref 0–1.5)
BILIRUB SERPL-MCNC: 0.2 MG/DL (ref 0–1.2)
BUN SERPL-MCNC: 11 MG/DL (ref 8–23)
BUN/CREAT SERPL: 11.7 (ref 7–25)
CALCIUM SPEC-SCNC: 7.8 MG/DL (ref 8.6–10.5)
CHLORIDE SERPL-SCNC: 112 MMOL/L (ref 98–107)
CO2 SERPL-SCNC: 21.5 MMOL/L (ref 22–29)
CREAT SERPL-MCNC: 0.94 MG/DL (ref 0.76–1.27)
DEPRECATED RDW RBC AUTO: 48.7 FL (ref 37–54)
EGFRCR SERPLBLD CKD-EPI 2021: 87.2 ML/MIN/1.73
EOSINOPHIL # BLD AUTO: 0.01 10*3/MM3 (ref 0–0.4)
EOSINOPHIL NFR BLD AUTO: 0.1 % (ref 0.3–6.2)
ERYTHROCYTE [DISTWIDTH] IN BLOOD BY AUTOMATED COUNT: 15.9 % (ref 12.3–15.4)
GLOBULIN UR ELPH-MCNC: 2.3 GM/DL
GLUCOSE SERPL-MCNC: 136 MG/DL (ref 65–99)
HCT VFR BLD AUTO: 32.3 % (ref 37.5–51)
HGB BLD-MCNC: 10.3 G/DL (ref 13–17.7)
IMM GRANULOCYTES # BLD AUTO: 0.17 10*3/MM3 (ref 0–0.05)
IMM GRANULOCYTES NFR BLD AUTO: 1.4 % (ref 0–0.5)
LYMPHOCYTES # BLD AUTO: 2.13 10*3/MM3 (ref 0.7–3.1)
LYMPHOCYTES NFR BLD AUTO: 17.6 % (ref 19.6–45.3)
MCH RBC QN AUTO: 27.8 PG (ref 26.6–33)
MCHC RBC AUTO-ENTMCNC: 31.9 G/DL (ref 31.5–35.7)
MCV RBC AUTO: 87.3 FL (ref 79–97)
MONOCYTES # BLD AUTO: 1.32 10*3/MM3 (ref 0.1–0.9)
MONOCYTES NFR BLD AUTO: 10.9 % (ref 5–12)
NEUTROPHILS NFR BLD AUTO: 69.4 % (ref 42.7–76)
NEUTROPHILS NFR BLD AUTO: 8.41 10*3/MM3 (ref 1.7–7)
NRBC BLD AUTO-RTO: 0 /100 WBC (ref 0–0.2)
PLATELET # BLD AUTO: 201 10*3/MM3 (ref 140–450)
PMV BLD AUTO: 11.2 FL (ref 6–12)
POTASSIUM SERPL-SCNC: 3.4 MMOL/L (ref 3.5–5.2)
PROT SERPL-MCNC: 5.4 G/DL (ref 6–8.5)
RBC # BLD AUTO: 3.7 10*6/MM3 (ref 4.14–5.8)
SODIUM SERPL-SCNC: 143 MMOL/L (ref 136–145)
WBC NRBC COR # BLD AUTO: 12.11 10*3/MM3 (ref 3.4–10.8)

## 2025-05-20 PROCEDURE — 96413 CHEMO IV INFUSION 1 HR: CPT

## 2025-05-20 PROCEDURE — 25010000003 DEXTROSE 5 % SOLUTION 500 ML FLEX CONT: Performed by: INTERNAL MEDICINE

## 2025-05-20 PROCEDURE — 25010000003 DEXTROSE 5 % SOLUTION: Performed by: INTERNAL MEDICINE

## 2025-05-20 PROCEDURE — 25010000002 PALONOSETRON PER 25 MCG: Performed by: INTERNAL MEDICINE

## 2025-05-20 PROCEDURE — 25010000002 LEUCOVORIN CALCIUM PER 50 MG: Performed by: INTERNAL MEDICINE

## 2025-05-20 PROCEDURE — 25810000003 SODIUM CHLORIDE 0.9 % SOLUTION 250 ML FLEX CONT: Performed by: INTERNAL MEDICINE

## 2025-05-20 PROCEDURE — 80053 COMPREHEN METABOLIC PANEL: CPT | Performed by: INTERNAL MEDICINE

## 2025-05-20 PROCEDURE — 25010000003 DEXTROSE 5 % SOLUTION 250 ML FLEX CONT: Performed by: INTERNAL MEDICINE

## 2025-05-20 PROCEDURE — 25010000002 IRINOTECAN PER 20 MG: Performed by: INTERNAL MEDICINE

## 2025-05-20 PROCEDURE — 96417 CHEMO IV INFUS EACH ADDL SEQ: CPT

## 2025-05-20 PROCEDURE — 25010000002 OXALIPLATIN PER 0.5 MG: Performed by: INTERNAL MEDICINE

## 2025-05-20 PROCEDURE — 25010000002 DEXAMETHASONE SODIUM PHOSPHATE 120 MG/30ML SOLUTION: Performed by: INTERNAL MEDICINE

## 2025-05-20 PROCEDURE — 25010000002 FLUOROURACIL PER 500 MG: Performed by: INTERNAL MEDICINE

## 2025-05-20 PROCEDURE — G0498 CHEMO EXTEND IV INFUS W/PUMP: HCPCS

## 2025-05-20 PROCEDURE — 96375 TX/PRO/DX INJ NEW DRUG ADDON: CPT

## 2025-05-20 PROCEDURE — 96368 THER/DIAG CONCURRENT INF: CPT

## 2025-05-20 PROCEDURE — 85025 COMPLETE CBC W/AUTO DIFF WBC: CPT | Performed by: INTERNAL MEDICINE

## 2025-05-20 PROCEDURE — 96415 CHEMO IV INFUSION ADDL HR: CPT

## 2025-05-20 RX ORDER — HYDROCORTISONE SODIUM SUCCINATE 100 MG/2ML
100 INJECTION INTRAMUSCULAR; INTRAVENOUS AS NEEDED
Status: DISCONTINUED | OUTPATIENT
Start: 2025-05-20 | End: 2025-05-21 | Stop reason: HOSPADM

## 2025-05-20 RX ORDER — PALONOSETRON 0.05 MG/ML
0.25 INJECTION, SOLUTION INTRAVENOUS ONCE
Status: COMPLETED | OUTPATIENT
Start: 2025-05-20 | End: 2025-05-20

## 2025-05-20 RX ORDER — DEXTROSE MONOHYDRATE 50 MG/ML
20 INJECTION, SOLUTION INTRAVENOUS ONCE
Status: COMPLETED | OUTPATIENT
Start: 2025-05-20 | End: 2025-05-20

## 2025-05-20 RX ORDER — HYDROCORTISONE SODIUM SUCCINATE 100 MG/2ML
100 INJECTION INTRAMUSCULAR; INTRAVENOUS AS NEEDED
Status: CANCELLED | OUTPATIENT
Start: 2025-05-20

## 2025-05-20 RX ORDER — OLANZAPINE 5 MG/1
5 TABLET, FILM COATED ORAL NIGHTLY
Qty: 30 TABLET | Refills: 2 | Status: SHIPPED | OUTPATIENT
Start: 2025-05-20

## 2025-05-20 RX ORDER — DIPHENHYDRAMINE HYDROCHLORIDE 50 MG/ML
50 INJECTION, SOLUTION INTRAMUSCULAR; INTRAVENOUS AS NEEDED
Status: DISCONTINUED | OUTPATIENT
Start: 2025-05-20 | End: 2025-05-21 | Stop reason: HOSPADM

## 2025-05-20 RX ORDER — FAMOTIDINE 10 MG/ML
20 INJECTION, SOLUTION INTRAVENOUS AS NEEDED
Status: DISCONTINUED | OUTPATIENT
Start: 2025-05-20 | End: 2025-05-21 | Stop reason: HOSPADM

## 2025-05-20 RX ORDER — ATROPINE SULFATE 1 MG/ML
0.25 INJECTION, SOLUTION INTRAMUSCULAR; INTRAVENOUS; SUBCUTANEOUS
Status: CANCELLED | OUTPATIENT
Start: 2025-05-20

## 2025-05-20 RX ORDER — DIPHENHYDRAMINE HYDROCHLORIDE 50 MG/ML
50 INJECTION, SOLUTION INTRAMUSCULAR; INTRAVENOUS AS NEEDED
Status: CANCELLED | OUTPATIENT
Start: 2025-05-20

## 2025-05-20 RX ORDER — DEXTROSE MONOHYDRATE 50 MG/ML
20 INJECTION, SOLUTION INTRAVENOUS ONCE
Status: CANCELLED | OUTPATIENT
Start: 2025-05-20

## 2025-05-20 RX ORDER — PALONOSETRON 0.05 MG/ML
0.25 INJECTION, SOLUTION INTRAVENOUS ONCE
Status: CANCELLED | OUTPATIENT
Start: 2025-05-20

## 2025-05-20 RX ORDER — FAMOTIDINE 10 MG/ML
20 INJECTION, SOLUTION INTRAVENOUS AS NEEDED
Status: CANCELLED | OUTPATIENT
Start: 2025-05-20

## 2025-05-20 RX ADMIN — LEUCOVORIN CALCIUM 660 MG: 350 INJECTION, POWDER, LYOPHILIZED, FOR SOLUTION INTRAMUSCULAR; INTRAVENOUS at 12:00

## 2025-05-20 RX ADMIN — DEXAMETHASONE SODIUM PHOSPHATE 12 MG: 4 INJECTION, SOLUTION INTRA-ARTICULAR; INTRALESIONAL; INTRAMUSCULAR; INTRAVENOUS; SOFT TISSUE at 09:22

## 2025-05-20 RX ADMIN — DEXTROSE MONOHYDRATE 20 ML/HR: 50 INJECTION, SOLUTION INTRAVENOUS at 09:22

## 2025-05-20 RX ADMIN — DEXTROSE MONOHYDRATE 140 MG: 50 INJECTION, SOLUTION INTRAVENOUS at 09:56

## 2025-05-20 RX ADMIN — PALONOSETRON HYDROCHLORIDE 0.25 MG: 0.25 INJECTION, SOLUTION INTRAVENOUS at 09:22

## 2025-05-20 RX ADMIN — DEXTROSE MONOHYDRATE 205 MG: 50 INJECTION, SOLUTION INTRAVENOUS at 12:30

## 2025-05-20 RX ADMIN — ATROPINE SULFATE 0.25 MG: 0.4 INJECTION, SOLUTION INTRAVENOUS at 12:30

## 2025-05-20 RX ADMIN — FLUOROURACIL 3930 MG: 50 INJECTION, SOLUTION INTRAVENOUS at 13:42

## 2025-05-20 NOTE — PROGRESS NOTES
Chief Complaint  Malignant neoplasm of head of pancreas    Forrest Duong MD Smith, Forrest Leon MD    Subjective          Josue Stern presents to [facility] for ongoing treatment of his pancreatic cancer.  He is on adjuvant FOLFIRINOX.  He is due for cycle 11.  He notes increased difficulty with his last cycle especially fatigue, poor appetite and taste changes.  He is not eating as well as he should.  He has ongoing neuropathy which is stable.  Denies new masses, adenopathy, unusual aches or pains.  No issues shortness port.    Oncology/Hematology History   Malignant neoplasm of head of pancreas   9/5/2024 Initial Diagnosis    Malignant neoplasm of pancreas     9/5/2024 -  Chemotherapy    OP CENTRAL VENOUS ACCESS DEVICE Access, Care, and Maintenance (CVAD)     9/17/2024 -  Chemotherapy    OP PANCREATIC mFOLFIRINOX (OXALIplatin / Leucovorin / Irinotecan / Fluorouracil CIV)     4/8/2025 Cancer Staged    Staging form: Exocrine Pancreas, AJCC 8th Edition  - Pathologic: Stage IIB (ypT2, ypN1, cM0) - Signed by Gene Blanchard MD on 4/8/2025           Current Outpatient Medications on File Prior to Visit   Medication Sig Dispense Refill    albuterol sulfate  (90 Base) MCG/ACT inhaler Inhale 2 puffs Every 4 (Four) Hours As Needed for Wheezing. 18 g 0    amLODIPine (Norvasc) 5 MG tablet Take 1 tablet by mouth Daily.      buprenorphine-naloxone (Suboxone) 8-2 MG film film Place 1 film under the tongue 2 (Two) Times a Day.      busPIRone (BUSPAR) 15 MG tablet Take 1 tablet by mouth 3 (Three) Times a Day.      cetirizine (ZyrTEC Allergy) 10 MG tablet Take 1 tablet by mouth Daily. (Patient not taking: Reported on 5/6/2025)      DULoxetine (CYMBALTA) 30 MG capsule TAKE 1 CAPSULE BY MOUTH DAILY 30 capsule 1    gabapentin (NEURONTIN) 800 MG tablet Take 1 tablet by mouth 3 (Three) Times a Day.      ibuprofen (ADVIL,MOTRIN) 800 MG tablet Take 1 tablet by mouth Every  8 (Eight) Hours As Needed for Mild Pain. 30 tablet 1    lidocaine-prilocaine (EMLA) 2.5-2.5 % cream Apply 1 Application topically to the appropriate area as directed As Needed for Injection Site Pain. 30 g 1    ondansetron (ZOFRAN) 8 MG tablet Take 1 tablet by mouth 3 (Three) Times a Day As Needed for Nausea or Vomiting. 30 tablet 5    pancrelipase, Lip-Prot-Amyl, (CREON) 34462-65869 units capsule delayed-release particles capsule Take 1 capsule by mouth 3 (Three) Times a Day With Meals. 90 capsule 1    prazosin (MINIPRESS) 1 MG capsule Take 1 capsule by mouth Every Night. (Patient not taking: Reported on 5/6/2025)      predniSONE (DELTASONE) 50 MG tablet Take 1 tablet by mouth Daily. (Patient not taking: Reported on 5/6/2025) 5 tablet 0    propranolol LA (Inderal LA) 60 MG 24 hr capsule Take 1 capsule by mouth Daily. (Patient not taking: Reported on 5/6/2025)      [DISCONTINUED] amoxicillin (AMOXIL) 500 MG capsule Take 1 capsule by mouth 3 times a day. (Patient not taking: Reported on 5/6/2025)      [DISCONTINUED] azithromycin (Zithromax Z-Tomasz) 250 MG tablet Take 2 tablets by mouth on day 1, then 1 tablet daily on days 2-5 (Patient not taking: Reported on 5/6/2025) 6 tablet 0     Current Facility-Administered Medications on File Prior to Visit   Medication Dose Route Frequency Provider Last Rate Last Admin    atropine sulfate (0.4 mg/mL) Intravenous 0.25 mg / 0.63 mL  0.25 mg Intravenous Q15 Min PRN Gene Blanchard MD        dexAMETHasone (DECADRON) IVPB 12 mg  12 mg Intravenous Once Gene Blanchard MD        dextrose (D5W) 5 % infusion  20 mL/hr Intravenous Once Gene Blanchard MD        diphenhydrAMINE (BENADRYL) injection 50 mg  50 mg Intravenous PRN Gene Blanchard MD        famotidine (PEPCID) injection 20 mg  20 mg Intravenous PRN Gene Blanchard MD        fluorouracil (ADRUCIL) 3,930 mg in sodium chloride 0.9 % 138 mL chemo infusion - FOR HOME USE  2,160 mg/m2 (Treatment Plan Recorded) Intravenous  Once Gene Blanchard MD        Hydrocortisone Sod Suc (PF) (Solu-CORTEF) injection 100 mg  100 mg Intravenous PRN Gene Blanchard MD        irinotecan (CAMPTOSAR) 205 mg in dextrose (D5W) 5 % 510.3 mL chemo IVPB  112.5 mg/m2 (Treatment Plan Recorded) Intravenous Once Gene Blanchard MD        leucovorin 660 mg in dextrose (D5W) 5 % 283 mL IVPB  360 mg/m2 (Treatment Plan Recorded) Intravenous Once Gene Blanchard MD        OXALIplatin (ELOXATIN) 140 mg in dextrose (D5W) 5 % 278 mL chemo IVPB  76.5 mg/m2 (Treatment Plan Recorded) Intravenous Once Gene Blanchard MD        palonosetron (ALOXI) injection 0.25 mg  0.25 mg Intravenous Once Gene Blanchard MD           Allergies   Allergen Reactions    Aspirin Nausea Only     Past Medical History:   Diagnosis Date    Allergies     Asthma     GERD (gastroesophageal reflux disease)     Hypertension     Malignant neoplasm of pancreas 09/05/2024     Past Surgical History:   Procedure Laterality Date    HERNIA REPAIR       Social History     Socioeconomic History    Marital status: Single   Tobacco Use    Smoking status: Every Day     Current packs/day: 0.50     Types: Cigarettes    Smokeless tobacco: Never   Vaping Use    Vaping status: Every Day   Substance and Sexual Activity    Alcohol use: Never    Drug use: Never    Sexual activity: Defer     History reviewed. No pertinent family history.    Objective   Physical Exam  Vitals reviewed. Exam conducted with a chaperone present.   Cardiovascular:      Rate and Rhythm: Normal rate and regular rhythm.      Heart sounds: Normal heart sounds. No murmur heard.     No gallop.   Pulmonary:      Breath sounds: Normal breath sounds.      Comments: Port-A-Cath  Abdominal:      General: Bowel sounds are normal.   Lymphadenopathy:      Cervical: No cervical adenopathy.   Psychiatric:         Mood and Affect: Mood normal.         Behavior: Behavior normal.         Vitals:    05/20/25 0850   BP: 137/77   Pulse: 53   Resp: 18  "  Temp: 97.8 °F (36.6 °C)   TempSrc: Temporal   SpO2: 98%   Weight: 62.6 kg (138 lb)   Height: 167.6 cm (65.98\")   PainSc: 0-No pain     ECOG score: 0         PHQ-9 Total Score:                    Result Review :   The following data was reviewed by: Gene Blanchard MD on 05/20/2025:  Lab Results   Component Value Date    HGB 10.3 (L) 05/20/2025    HCT 32.3 (L) 05/20/2025    MCV 87.3 05/20/2025     05/20/2025    WBC 12.11 (H) 05/20/2025    NEUTROABS 8.41 (H) 05/20/2025    LYMPHSABS 2.13 05/20/2025    MONOSABS 1.32 (H) 05/20/2025    EOSABS 0.01 05/20/2025    BASOSABS 0.07 05/20/2025     Lab Results   Component Value Date    GLUCOSE 136 (H) 05/20/2025    BUN 11 05/20/2025    CREATININE 0.94 05/20/2025     05/20/2025    K 3.4 (L) 05/20/2025     (H) 05/20/2025    CO2 21.5 (L) 05/20/2025    CALCIUM 7.8 (L) 05/20/2025    PROTEINTOT 5.4 (L) 05/20/2025    ALBUMIN 3.1 (L) 05/20/2025    BILITOT 0.2 05/20/2025    ALKPHOS 235 (H) 05/20/2025    AST 27 05/20/2025    ALT 20 05/20/2025     Lab Results   Component Value Date    MG 1.7 12/10/2021    PHOS 3.4 12/10/2021    FREET4 1.74 06/30/2020    TSH 0.256 (L) 12/06/2021     Lab Results   Component Value Date    IRON 15 (L) 12/08/2021    LABIRON 7 (L) 12/08/2021    TRANSFERRIN 150 (L) 12/08/2021    TIBC 224 (L) 12/08/2021     Lab Results   Component Value Date    FERRITIN 75 12/21/2020    QKQVHNDH93 1,022 (H) 12/08/2021    FOLATE 10.90 12/08/2021     Lab Results   Component Value Date     168.0 (H) 04/03/2025             Assessment and Plan    Diagnoses and all orders for this visit:    1. Malignant neoplasm of head of pancreas (Primary)  Assessment & Plan:  Patient is on adjuvant FOLFIRINOX.  He is having increasing difficulty with  taste changes, decreased appetite and fatigue.  He is taking his Creon supplement.  His total protein and albumin have dropped consistent with an adequate nutrition.  He is followed with our dietitian.  I will start Zyprexa 5 " mg at night to help with his appetite.  Blood work is notable for mild cytopenias related to his regimen but not enough to require dose adjustment.  Proceed with cycle 11 as planned.  I will see him back for cycle 12-day 1 with lab work prior to monitor for toxicities.  This will complete his adjuvant treatment.      Orders:  -     CBC and Differential; Future  -     Comprehensive metabolic panel; Future  -     Infusion Appointment Request 10; Future  -     OLANZapine (zyPREXA) 5 MG tablet; Take 1 tablet by mouth Every Night.  Dispense: 30 tablet; Refill: 2    2. Decreased appetite  -     OLANZapine (zyPREXA) 5 MG tablet; Take 1 tablet by mouth Every Night.  Dispense: 30 tablet; Refill: 2    Other orders  -     Cancel: dextrose (D5W) 5 % infusion  -     Cancel: palonosetron (ALOXI) injection 0.25 mg  -     Cancel: dexAMETHasone (DECADRON) 12 mg in sodium chloride 0.9 % IVPB  -     Cancel: OXALIplatin (ELOXATIN) 140 mg in dextrose (D5W) 5 % 278 mL chemo IVPB  -     Cancel: leucovorin 660 mg in dextrose (D5W) 5 % 316 mL IVPB  -     Cancel: irinotecan (CAMPTOSAR) 205 mg in dextrose (D5W) 5 % 510.3 mL chemo IVPB  -     Cancel: atropine injection 0.25 mg  -     Cancel: fluorouracil (ADRUCIL) 3,930 mg in sodium chloride 0.9 % 78.6 mL chemo infusion - FOR HOME USE  -     Cancel: Hydrocortisone Sod Suc (PF) (Solu-CORTEF) injection 100 mg  -     Cancel: diphenhydrAMINE (BENADRYL) injection 50 mg  -     Cancel: famotidine (PEPCID) injection 20 mg  -     pegfilgrastim (NEULASTA ONPRO) on-body injector 6 mg            Patient Follow Up: Cycle 12-day 1    Patient was given instructions and counseling regarding his condition or for health maintenance advice. Please see specific information pulled into the AVS if appropriate.     Gene Blanchard MD    5/20/2025

## 2025-05-20 NOTE — NURSING NOTE
"Pt has one hour left of his irinotecan and leucovorin infusion. Pt declines to finish the infusion despite education by this RN. Pt says \"I just want to go home, I am tired of sitting here. I want my pump to take home and stop the chemo for today.\" Dr. Blanchard was made aware and is agreeable to the POC. Pt denies issues or needs.  "

## 2025-05-20 NOTE — ASSESSMENT & PLAN NOTE
Patient is on adjuvant FOLFIRINOX.  He is having increasing difficulty with  taste changes, decreased appetite and fatigue.  He is taking his Creon supplement.  His total protein and albumin have dropped consistent with an adequate nutrition.  He is followed with our dietitian.  I will start Zyprexa 5 mg at night to help with his appetite.  Blood work is notable for mild cytopenias related to his regimen but not enough to require dose adjustment.  Proceed with cycle 11 as planned.  I will see him back for cycle 12-day 1 with lab work prior to monitor for toxicities.  This will complete his adjuvant treatment.

## 2025-05-20 NOTE — PROGRESS NOTES
Diagnosis: Pancreatic cancer    Reason for Referral: Rental assistance    Content of Visit: OSW received a notification from the registrar that Mr. Stern is requesting to speak with OSW today. OSW met with Mr. Stern in the medical oncology infusion center this morning. He provided OSW with a copy of his work schedule, reporting his employer has only been giving him around 19 hours a week due to being afraid he's going to get hurt at work. Mr. Stern is inquiring if the oncologist can provide a letter to help support and advocate that he receive more hours from his employer. Additionally, Mr. Stern is inquiring if [employer] may be able to assist with his rent for the month of June. Advised that Nicolle with [employer] is out of the office until next Monday, but will submit this request to her today. He v/u and expressed appreciation. OSW consulted with the oncologist regarding the letter request and assisted in getting this constructed and signed. Provided Mr. Stern with the letter to submit to his employer. Encouraged OSW support remains available. He expressed appreciation.    Resources/Referrals Provided: [employer], work clearance letter

## 2025-05-22 ENCOUNTER — HOSPITAL ENCOUNTER (OUTPATIENT)
Dept: ONCOLOGY | Facility: HOSPITAL | Age: 70
Discharge: HOME | End: 2025-05-22
Admitting: INTERNAL MEDICINE
Payer: MEDICARE

## 2025-05-22 VITALS — HEART RATE: 61 BPM | DIASTOLIC BLOOD PRESSURE: 73 MMHG | SYSTOLIC BLOOD PRESSURE: 121 MMHG

## 2025-05-22 DIAGNOSIS — C25.0 MALIGNANT NEOPLASM OF HEAD OF PANCREAS: Primary | ICD-10-CM

## 2025-05-22 PROCEDURE — 25010000002 HEPARIN LOCK FLUSH PER 10 UNITS: Performed by: INTERNAL MEDICINE

## 2025-05-22 PROCEDURE — 96377 APPLICATON ON-BODY INJECTOR: CPT

## 2025-05-22 PROCEDURE — 25010000002 PEGFILGRASTIM 6 MG/0.6ML PREFILLED SYRINGE KIT: Performed by: INTERNAL MEDICINE

## 2025-05-22 RX ORDER — HEPARIN SODIUM (PORCINE) LOCK FLUSH IV SOLN 100 UNIT/ML 100 UNIT/ML
500 SOLUTION INTRAVENOUS AS NEEDED
Status: DISCONTINUED | OUTPATIENT
Start: 2025-05-22 | End: 2025-05-23 | Stop reason: HOSPADM

## 2025-05-22 RX ORDER — SODIUM CHLORIDE 0.9 % (FLUSH) 0.9 %
20 SYRINGE (ML) INJECTION AS NEEDED
Status: DISCONTINUED | OUTPATIENT
Start: 2025-05-22 | End: 2025-05-23 | Stop reason: HOSPADM

## 2025-05-22 RX ORDER — HEPARIN SODIUM (PORCINE) LOCK FLUSH IV SOLN 100 UNIT/ML 100 UNIT/ML
500 SOLUTION INTRAVENOUS AS NEEDED
OUTPATIENT
Start: 2025-05-22

## 2025-05-22 RX ORDER — SODIUM CHLORIDE 0.9 % (FLUSH) 0.9 %
20 SYRINGE (ML) INJECTION AS NEEDED
OUTPATIENT
Start: 2025-05-22

## 2025-05-22 RX ADMIN — PEGFILGRASTIM 6 MG: KIT SUBCUTANEOUS at 11:42

## 2025-05-22 RX ADMIN — HEPARIN 500 UNITS: 100 SYRINGE at 11:39

## 2025-05-22 RX ADMIN — Medication 20 ML: at 11:39

## 2025-05-27 ENCOUNTER — TELEPHONE (OUTPATIENT)
Dept: ONCOLOGY | Facility: HOSPITAL | Age: 70
End: 2025-05-27
Payer: MEDICARE

## 2025-05-27 NOTE — TELEPHONE ENCOUNTER
OSW received a response from [employer] [address] [address] yesterday that she will send a check request for $525 to assist with Mr. Stern's rent for the month of June. OSW contacted Mr. Stern via telephone this morning to make him aware. He v/u and expressed appreciation. OSW support remains available.

## 2025-06-03 ENCOUNTER — OFFICE VISIT (OUTPATIENT)
Dept: ONCOLOGY | Facility: HOSPITAL | Age: 70
End: 2025-06-03
Payer: MEDICARE

## 2025-06-03 ENCOUNTER — DOCUMENTATION (OUTPATIENT)
Dept: ONCOLOGY | Facility: HOSPITAL | Age: 70
End: 2025-06-03
Payer: MEDICARE

## 2025-06-03 ENCOUNTER — HOSPITAL ENCOUNTER (OUTPATIENT)
Dept: ONCOLOGY | Facility: HOSPITAL | Age: 70
Discharge: HOME | End: 2025-06-03
Payer: MEDICARE

## 2025-06-03 VITALS
DIASTOLIC BLOOD PRESSURE: 100 MMHG | OXYGEN SATURATION: 97 % | SYSTOLIC BLOOD PRESSURE: 159 MMHG | BODY MASS INDEX: 22.46 KG/M2 | HEART RATE: 62 BPM | RESPIRATION RATE: 18 BRPM | WEIGHT: 139.1 LBS | TEMPERATURE: 97.5 F

## 2025-06-03 DIAGNOSIS — M79.89 SWELLING OF LEFT LOWER EXTREMITY: ICD-10-CM

## 2025-06-03 DIAGNOSIS — C25.0 MALIGNANT NEOPLASM OF HEAD OF PANCREAS: Primary | ICD-10-CM

## 2025-06-03 DIAGNOSIS — R63.0 DECREASED APPETITE: ICD-10-CM

## 2025-06-03 LAB
ALBUMIN SERPL-MCNC: 2.9 G/DL (ref 3.5–5.2)
ALBUMIN/GLOB SERPL: 1.3 G/DL
ALP SERPL-CCNC: 234 U/L (ref 39–117)
ALT SERPL W P-5'-P-CCNC: 15 U/L (ref 1–41)
ANION GAP SERPL CALCULATED.3IONS-SCNC: 10.3 MMOL/L (ref 5–15)
ANISOCYTOSIS BLD QL: NORMAL
AST SERPL-CCNC: 27 U/L (ref 1–40)
BASOPHILS # BLD AUTO: 0.06 10*3/MM3 (ref 0–0.2)
BASOPHILS NFR BLD AUTO: 0.6 % (ref 0–1.5)
BILIRUB SERPL-MCNC: 0.2 MG/DL (ref 0–1.2)
BUN SERPL-MCNC: 7.7 MG/DL (ref 8–23)
BUN/CREAT SERPL: 7.9 (ref 7–25)
BURR CELLS BLD QL SMEAR: NORMAL
C3 FRG RBC-MCNC: NORMAL
CALCIUM SPEC-SCNC: 8 MG/DL (ref 8.6–10.5)
CHLORIDE SERPL-SCNC: 112 MMOL/L (ref 98–107)
CO2 SERPL-SCNC: 20.7 MMOL/L (ref 22–29)
CREAT SERPL-MCNC: 0.97 MG/DL (ref 0.76–1.27)
DACRYOCYTES BLD QL SMEAR: NORMAL
DEPRECATED RDW RBC AUTO: 54.6 FL (ref 37–54)
EGFRCR SERPLBLD CKD-EPI 2021: 84 ML/MIN/1.73
EOSINOPHIL # BLD AUTO: 0.01 10*3/MM3 (ref 0–0.4)
EOSINOPHIL NFR BLD AUTO: 0.1 % (ref 0.3–6.2)
ERYTHROCYTE [DISTWIDTH] IN BLOOD BY AUTOMATED COUNT: 18 % (ref 12.3–15.4)
GLOBULIN UR ELPH-MCNC: 2.3 GM/DL
GLUCOSE SERPL-MCNC: 133 MG/DL (ref 65–99)
HCT VFR BLD AUTO: 30.7 % (ref 37.5–51)
HGB BLD-MCNC: 9.7 G/DL (ref 13–17.7)
HYPOCHROMIA BLD QL: NORMAL
IMM GRANULOCYTES # BLD AUTO: 0.55 10*3/MM3 (ref 0–0.05)
IMM GRANULOCYTES NFR BLD AUTO: 5.4 % (ref 0–0.5)
LYMPHOCYTES # BLD AUTO: 2.31 10*3/MM3 (ref 0.7–3.1)
LYMPHOCYTES NFR BLD AUTO: 22.5 % (ref 19.6–45.3)
MCH RBC QN AUTO: 27.8 PG (ref 26.6–33)
MCHC RBC AUTO-ENTMCNC: 31.6 G/DL (ref 31.5–35.7)
MCV RBC AUTO: 88 FL (ref 79–97)
MICROCYTES BLD QL: NORMAL
MONOCYTES # BLD AUTO: 1.21 10*3/MM3 (ref 0.1–0.9)
MONOCYTES NFR BLD AUTO: 11.8 % (ref 5–12)
NEUTROPHILS NFR BLD AUTO: 59.6 % (ref 42.7–76)
NEUTROPHILS NFR BLD AUTO: 6.13 10*3/MM3 (ref 1.7–7)
NRBC BLD AUTO-RTO: 0 /100 WBC (ref 0–0.2)
PLATELET # BLD AUTO: 160 10*3/MM3 (ref 140–450)
PMV BLD AUTO: 10.4 FL (ref 6–12)
POIKILOCYTOSIS BLD QL SMEAR: NORMAL
POLYCHROMASIA BLD QL SMEAR: NORMAL
POTASSIUM SERPL-SCNC: 3.5 MMOL/L (ref 3.5–5.2)
PROT SERPL-MCNC: 5.2 G/DL (ref 6–8.5)
RBC # BLD AUTO: 3.49 10*6/MM3 (ref 4.14–5.8)
SMALL PLATELETS BLD QL SMEAR: ADEQUATE
SODIUM SERPL-SCNC: 143 MMOL/L (ref 136–145)
WBC MORPH BLD: NORMAL
WBC NRBC COR # BLD AUTO: 10.27 10*3/MM3 (ref 3.4–10.8)

## 2025-06-03 PROCEDURE — 80053 COMPREHEN METABOLIC PANEL: CPT | Performed by: INTERNAL MEDICINE

## 2025-06-03 PROCEDURE — 25010000002 LEUCOVORIN CALCIUM PER 50 MG: Performed by: INTERNAL MEDICINE

## 2025-06-03 PROCEDURE — 96415 CHEMO IV INFUSION ADDL HR: CPT

## 2025-06-03 PROCEDURE — 25010000003 DEXTROSE 5 % SOLUTION: Performed by: INTERNAL MEDICINE

## 2025-06-03 PROCEDURE — 25010000002 DEXAMETHASONE SODIUM PHOSPHATE 120 MG/30ML SOLUTION: Performed by: INTERNAL MEDICINE

## 2025-06-03 PROCEDURE — 1159F MED LIST DOCD IN RCRD: CPT | Performed by: INTERNAL MEDICINE

## 2025-06-03 PROCEDURE — 96413 CHEMO IV INFUSION 1 HR: CPT

## 2025-06-03 PROCEDURE — 96376 TX/PRO/DX INJ SAME DRUG ADON: CPT

## 2025-06-03 PROCEDURE — 1125F AMNT PAIN NOTED PAIN PRSNT: CPT | Performed by: INTERNAL MEDICINE

## 2025-06-03 PROCEDURE — 25010000002 PALONOSETRON PER 25 MCG: Performed by: INTERNAL MEDICINE

## 2025-06-03 PROCEDURE — 1160F RVW MEDS BY RX/DR IN RCRD: CPT | Performed by: INTERNAL MEDICINE

## 2025-06-03 PROCEDURE — 96368 THER/DIAG CONCURRENT INF: CPT

## 2025-06-03 PROCEDURE — 85007 BL SMEAR W/DIFF WBC COUNT: CPT | Performed by: INTERNAL MEDICINE

## 2025-06-03 PROCEDURE — 25010000003 DEXTROSE 5 % SOLUTION 250 ML FLEX CONT: Performed by: INTERNAL MEDICINE

## 2025-06-03 PROCEDURE — 25010000003 DEXTROSE 5 % SOLUTION 500 ML FLEX CONT: Performed by: INTERNAL MEDICINE

## 2025-06-03 PROCEDURE — 25010000002 OXALIPLATIN PER 0.5 MG: Performed by: INTERNAL MEDICINE

## 2025-06-03 PROCEDURE — 96375 TX/PRO/DX INJ NEW DRUG ADDON: CPT

## 2025-06-03 PROCEDURE — G0498 CHEMO EXTEND IV INFUS W/PUMP: HCPCS

## 2025-06-03 PROCEDURE — G2211 COMPLEX E/M VISIT ADD ON: HCPCS | Performed by: INTERNAL MEDICINE

## 2025-06-03 PROCEDURE — 25010000002 FLUOROURACIL PER 500 MG: Performed by: INTERNAL MEDICINE

## 2025-06-03 PROCEDURE — 96417 CHEMO IV INFUS EACH ADDL SEQ: CPT

## 2025-06-03 PROCEDURE — 85025 COMPLETE CBC W/AUTO DIFF WBC: CPT | Performed by: INTERNAL MEDICINE

## 2025-06-03 PROCEDURE — 99214 OFFICE O/P EST MOD 30 MIN: CPT | Performed by: INTERNAL MEDICINE

## 2025-06-03 PROCEDURE — 25010000002 IRINOTECAN PER 20 MG: Performed by: INTERNAL MEDICINE

## 2025-06-03 PROCEDURE — 25810000003 SODIUM CHLORIDE 0.9 % SOLUTION 250 ML FLEX CONT: Performed by: INTERNAL MEDICINE

## 2025-06-03 RX ORDER — SODIUM CHLORIDE 0.9 % (FLUSH) 0.9 %
20 SYRINGE (ML) INJECTION AS NEEDED
Status: DISCONTINUED | OUTPATIENT
Start: 2025-06-03 | End: 2025-06-04 | Stop reason: HOSPADM

## 2025-06-03 RX ORDER — DIPHENHYDRAMINE HYDROCHLORIDE 50 MG/ML
50 INJECTION, SOLUTION INTRAMUSCULAR; INTRAVENOUS AS NEEDED
Status: CANCELLED | OUTPATIENT
Start: 2025-06-03

## 2025-06-03 RX ORDER — DIPHENHYDRAMINE HYDROCHLORIDE 50 MG/ML
50 INJECTION, SOLUTION INTRAMUSCULAR; INTRAVENOUS AS NEEDED
Status: DISCONTINUED | OUTPATIENT
Start: 2025-06-03 | End: 2025-06-04 | Stop reason: HOSPADM

## 2025-06-03 RX ORDER — PALONOSETRON 0.05 MG/ML
0.25 INJECTION, SOLUTION INTRAVENOUS ONCE
Status: CANCELLED | OUTPATIENT
Start: 2025-06-03

## 2025-06-03 RX ORDER — DEXTROSE MONOHYDRATE 50 MG/ML
20 INJECTION, SOLUTION INTRAVENOUS ONCE
Status: COMPLETED | OUTPATIENT
Start: 2025-06-03 | End: 2025-06-03

## 2025-06-03 RX ORDER — ONDANSETRON 8 MG/1
8 TABLET, FILM COATED ORAL 3 TIMES DAILY PRN
Qty: 30 TABLET | Refills: 5 | Status: CANCELLED | OUTPATIENT
Start: 2025-06-03

## 2025-06-03 RX ORDER — FAMOTIDINE 10 MG/ML
20 INJECTION, SOLUTION INTRAVENOUS AS NEEDED
Status: CANCELLED | OUTPATIENT
Start: 2025-06-03

## 2025-06-03 RX ORDER — PALONOSETRON 0.05 MG/ML
0.25 INJECTION, SOLUTION INTRAVENOUS ONCE
Status: COMPLETED | OUTPATIENT
Start: 2025-06-03 | End: 2025-06-03

## 2025-06-03 RX ORDER — SODIUM CHLORIDE 0.9 % (FLUSH) 0.9 %
20 SYRINGE (ML) INJECTION AS NEEDED
Status: CANCELLED | OUTPATIENT
Start: 2025-06-03

## 2025-06-03 RX ORDER — HYDROCORTISONE SODIUM SUCCINATE 100 MG/2ML
100 INJECTION INTRAMUSCULAR; INTRAVENOUS AS NEEDED
Status: CANCELLED | OUTPATIENT
Start: 2025-06-03

## 2025-06-03 RX ORDER — FAMOTIDINE 10 MG/ML
20 INJECTION, SOLUTION INTRAVENOUS AS NEEDED
Status: DISCONTINUED | OUTPATIENT
Start: 2025-06-03 | End: 2025-06-04 | Stop reason: HOSPADM

## 2025-06-03 RX ORDER — OLANZAPINE 5 MG/1
5 TABLET, FILM COATED ORAL NIGHTLY
Qty: 30 TABLET | Refills: 2 | Status: SHIPPED | OUTPATIENT
Start: 2025-06-03

## 2025-06-03 RX ORDER — HYDROCORTISONE SODIUM SUCCINATE 100 MG/2ML
100 INJECTION INTRAMUSCULAR; INTRAVENOUS AS NEEDED
Status: DISCONTINUED | OUTPATIENT
Start: 2025-06-03 | End: 2025-06-04 | Stop reason: HOSPADM

## 2025-06-03 RX ORDER — TRAZODONE HYDROCHLORIDE 100 MG/1
TABLET ORAL EVERY 24 HOURS
COMMUNITY

## 2025-06-03 RX ORDER — HEPARIN SODIUM (PORCINE) LOCK FLUSH IV SOLN 100 UNIT/ML 100 UNIT/ML
500 SOLUTION INTRAVENOUS AS NEEDED
Status: CANCELLED | OUTPATIENT
Start: 2025-06-05

## 2025-06-03 RX ORDER — HEPARIN SODIUM (PORCINE) LOCK FLUSH IV SOLN 100 UNIT/ML 100 UNIT/ML
500 SOLUTION INTRAVENOUS AS NEEDED
Status: DISCONTINUED | OUTPATIENT
Start: 2025-06-03 | End: 2025-06-04 | Stop reason: HOSPADM

## 2025-06-03 RX ORDER — DEXTROSE MONOHYDRATE 50 MG/ML
20 INJECTION, SOLUTION INTRAVENOUS ONCE
Status: CANCELLED | OUTPATIENT
Start: 2025-06-03

## 2025-06-03 RX ORDER — ATROPINE SULFATE 1 MG/ML
0.25 INJECTION, SOLUTION INTRAMUSCULAR; INTRAVENOUS; SUBCUTANEOUS
Status: CANCELLED | OUTPATIENT
Start: 2025-06-03

## 2025-06-03 RX ADMIN — ATROPINE SULFATE 0.25 MG: 0.4 INJECTION, SOLUTION INTRAVENOUS at 12:52

## 2025-06-03 RX ADMIN — PALONOSETRON HYDROCHLORIDE 0.25 MG: 0.25 INJECTION, SOLUTION INTRAVENOUS at 09:10

## 2025-06-03 RX ADMIN — OXALIPLATIN 140 MG: 5 INJECTION, SOLUTION INTRAVENOUS at 10:01

## 2025-06-03 RX ADMIN — ATROPINE SULFATE 0.25 MG: 0.4 INJECTION, SOLUTION INTRAVENOUS at 14:27

## 2025-06-03 RX ADMIN — DEXAMETHASONE SODIUM PHOSPHATE 12 MG: 4 INJECTION, SOLUTION INTRA-ARTICULAR; INTRALESIONAL; INTRAMUSCULAR; INTRAVENOUS; SOFT TISSUE at 09:12

## 2025-06-03 RX ADMIN — DEXTROSE MONOHYDRATE 20 ML/HR: 50 INJECTION, SOLUTION INTRAVENOUS at 09:08

## 2025-06-03 RX ADMIN — FLUOROURACIL 3930 MG: 50 INJECTION, SOLUTION INTRAVENOUS at 14:30

## 2025-06-03 RX ADMIN — Medication 20 ML: at 08:06

## 2025-06-03 RX ADMIN — DEXTROSE MONOHYDRATE 205 MG: 50 INJECTION, SOLUTION INTRAVENOUS at 12:53

## 2025-06-03 RX ADMIN — LEUCOVORIN CALCIUM 660 MG: 350 INJECTION, POWDER, LYOPHILIZED, FOR SOLUTION INTRAMUSCULAR; INTRAVENOUS at 12:15

## 2025-06-03 NOTE — PROGRESS NOTES
Chief Complaint  Malignant neoplasm of head of pancreas    Forrest Duong MD Smith, Forrest Leon MD    Subjective          Josue Stern presents to [facility] for Ongoing treatment of his pancreatic cancer.  He is on adjuvant modified FOLFIRINOX.  He is due for cycle 12.  He notes more fatigue with the last cycle but still able to do his ADLs including working.  His appetite is a little better but he notes taste changes.  He is taking Creon and denies oily diarrhea.  He denies any masses or adenopathy.  No issues from his Port-A-Cath.  He reports swelling on his legs left more so than right.  He denies any injury to the area.    Oncology/Hematology History   Malignant neoplasm of head of pancreas   9/5/2024 Initial Diagnosis    Malignant neoplasm of pancreas     9/5/2024 -  Chemotherapy    OP CENTRAL VENOUS ACCESS DEVICE Access, Care, and Maintenance (CVAD)     9/17/2024 -  Chemotherapy    OP PANCREATIC mFOLFIRINOX (OXALIplatin / Leucovorin / Irinotecan / Fluorouracil CIV)     4/8/2025 Cancer Staged    Staging form: Exocrine Pancreas, AJCC 8th Edition  - Pathologic: Stage IIB (ypT2, ypN1, cM0) - Signed by Gene Blanchard MD on 4/8/2025           Current Outpatient Medications on File Prior to Visit   Medication Sig Dispense Refill    albuterol sulfate  (90 Base) MCG/ACT inhaler Inhale 2 puffs Every 4 (Four) Hours As Needed for Wheezing. 18 g 0    amLODIPine (Norvasc) 5 MG tablet Take 1 tablet by mouth Daily.      buprenorphine-naloxone (Suboxone) 8-2 MG film film Place 1 film under the tongue 2 (Two) Times a Day.      busPIRone (BUSPAR) 15 MG tablet Take 1 tablet by mouth 3 (Three) Times a Day.      cetirizine (ZyrTEC Allergy) 10 MG tablet Take 1 tablet by mouth Daily. (Patient not taking: Reported on 5/6/2025)      DULoxetine (CYMBALTA) 30 MG capsule TAKE 1 CAPSULE BY MOUTH DAILY 30 capsule 1    gabapentin (NEURONTIN) 800 MG tablet Take 1 tablet by  mouth 3 (Three) Times a Day.      ibuprofen (ADVIL,MOTRIN) 800 MG tablet Take 1 tablet by mouth Every 8 (Eight) Hours As Needed for Mild Pain. 30 tablet 1    lidocaine-prilocaine (EMLA) 2.5-2.5 % cream Apply 1 Application topically to the appropriate area as directed As Needed for Injection Site Pain. 30 g 1    ondansetron (ZOFRAN) 8 MG tablet Take 1 tablet by mouth 3 (Three) Times a Day As Needed for Nausea or Vomiting. 30 tablet 5    pancrelipase, Lip-Prot-Amyl, (CREON) 06069-85943 units capsule delayed-release particles capsule Take 1 capsule by mouth 3 (Three) Times a Day With Meals. 90 capsule 1    prazosin (MINIPRESS) 1 MG capsule Take 1 capsule by mouth Every Night. (Patient not taking: Reported on 5/6/2025)      propranolol LA (Inderal LA) 60 MG 24 hr capsule Take 1 capsule by mouth Daily. (Patient not taking: Reported on 5/6/2025)      traZODone (DESYREL) 100 MG tablet Daily.      [DISCONTINUED] OLANZapine (zyPREXA) 5 MG tablet Take 1 tablet by mouth Every Night. 30 tablet 2    [DISCONTINUED] predniSONE (DELTASONE) 50 MG tablet Take 1 tablet by mouth Daily. (Patient not taking: Reported on 5/6/2025) 5 tablet 0     Current Facility-Administered Medications on File Prior to Visit   Medication Dose Route Frequency Provider Last Rate Last Admin    heparin injection 500 Units  500 Units Intravenous PRN Gene Blanchard MD        sodium chloride 0.9 % flush 20 mL  20 mL Intravenous PRN Gene Blanchard MD   20 mL at 06/03/25 0806       Allergies   Allergen Reactions    Aspirin Nausea Only     Past Medical History:   Diagnosis Date    Allergies     Asthma     GERD (gastroesophageal reflux disease)     Hypertension     Malignant neoplasm of pancreas 09/05/2024     Past Surgical History:   Procedure Laterality Date    HERNIA REPAIR       Social History     Socioeconomic History    Marital status: Single   Tobacco Use    Smoking status: Every Day     Current packs/day: 0.50     Types: Cigarettes    Smokeless  tobacco: Never   Vaping Use    Vaping status: Every Day   Substance and Sexual Activity    Alcohol use: Never    Drug use: Never    Sexual activity: Defer     History reviewed. No pertinent family history.    Objective   Physical Exam  Vitals reviewed. Exam conducted with a chaperone present.   Cardiovascular:      Rate and Rhythm: Normal rate and regular rhythm.      Heart sounds: Normal heart sounds. No murmur heard.     No gallop.   Pulmonary:      Effort: Pulmonary effort is normal.      Breath sounds: Normal breath sounds.   Abdominal:      General: Bowel sounds are normal.   Musculoskeletal:      Right lower leg: Edema present.      Left lower leg: Edema present.   Lymphadenopathy:      Cervical: No cervical adenopathy.   Psychiatric:         Mood and Affect: Mood normal.         Behavior: Behavior normal.         Vitals:    06/03/25 0821   BP: 159/100   Pulse: 62   Resp: 18   Temp: 97.5 °F (36.4 °C)   TempSrc: Temporal   SpO2: 97%   Weight: 63.1 kg (139 lb 1.6 oz)   PainSc: 6    PainLoc: Foot     ECOG score: 0         PHQ-9 Total Score:                    Result Review :   The following data was reviewed by: Gene Blanchard MD on 06/03/2025:  Lab Results   Component Value Date    HGB 9.7 (L) 06/03/2025    HCT 30.7 (L) 06/03/2025    MCV 88.0 06/03/2025     06/03/2025    WBC 10.27 06/03/2025    NEUTROABS 6.13 06/03/2025    LYMPHSABS 2.31 06/03/2025    MONOSABS 1.21 (H) 06/03/2025    EOSABS 0.01 06/03/2025    BASOSABS 0.06 06/03/2025     Lab Results   Component Value Date    GLUCOSE 133 (H) 06/03/2025    BUN 7.7 (L) 06/03/2025    CREATININE 0.97 06/03/2025     06/03/2025    K 3.5 06/03/2025     (H) 06/03/2025    CO2 20.7 (L) 06/03/2025    CALCIUM 8.0 (L) 06/03/2025    PROTEINTOT 5.2 (L) 06/03/2025    ALBUMIN 2.9 (L) 06/03/2025    BILITOT 0.2 06/03/2025    ALKPHOS 234 (H) 06/03/2025    AST 27 06/03/2025    ALT 15 06/03/2025     Lab Results   Component Value Date    MG 1.7 12/10/2021    PHOS  3.4 12/10/2021    FREET4 1.74 06/30/2020    TSH 0.256 (L) 12/06/2021     Lab Results   Component Value Date    IRON 15 (L) 12/08/2021    LABIRON 7 (L) 12/08/2021    TRANSFERRIN 150 (L) 12/08/2021    TIBC 224 (L) 12/08/2021     Lab Results   Component Value Date    FERRITIN 75 12/21/2020    CTZOJCKL89 1,022 (H) 12/08/2021    FOLATE 10.90 12/08/2021     Lab Results   Component Value Date     168.0 (H) 04/03/2025             Assessment and Plan    Diagnoses and all orders for this visit:    1. Malignant neoplasm of head of pancreas (Primary)  Assessment & Plan:  Patient is receiving adjuvant modified FOLFIRINOX.  He is due for his 12th and final cycle.  And increased fatigue and poor appetite with the last cycle.  Lab work today is adequate for treatment.  Proceed with cycle 12 as planned.  This will complete his chemotherapy.  I will see him back in 4 to 6 weeks to ensure that all acute toxicities are resolving with lab work and restaging scans prior.    Orders:  -     CBC and Differential; Future  -     Comprehensive metabolic panel; Future  -     CT chest w contrast; Future  -     CT abdomen pelvis w contrast; Future  -     CBC & Differential; Future  -     Comprehensive Metabolic Panel; Future  -     Cancer Antigen 19-9; Future  -     OLANZapine (zyPREXA) 5 MG tablet; Take 1 tablet by mouth Every Night.  Dispense: 30 tablet; Refill: 2  -     Infusion Appointment Request 10; Future    2. Decreased appetite  Assessment & Plan:  Continue Zyprexa daily.  Refill provided today.    Orders:  -     OLANZapine (zyPREXA) 5 MG tablet; Take 1 tablet by mouth Every Night.  Dispense: 30 tablet; Refill: 2    3. Swelling of left lower extremity  Assessment & Plan:  Swelling of both lower extremities but left more so than right.  In part related to oncotic pressure with decreased total protein and albumin.  We discussed increasing protein in the diet.  I will ultrasound the left lower extremity to assess for thromboembolic  events.    Orders:  -     US Venous Doppler Lower Extremity Left (duplex); Future    Other orders  -     Cancel: dextrose (D5W) 5 % infusion  -     Cancel: palonosetron (ALOXI) injection 0.25 mg  -     Cancel: dexAMETHasone (DECADRON) 12 mg in sodium chloride 0.9 % IVPB  -     Cancel: OXALIplatin (ELOXATIN) 140 mg in dextrose (D5W) 5 % 278 mL chemo IVPB  -     Cancel: leucovorin 660 mg in dextrose (D5W) 5 % 316 mL IVPB  -     Cancel: irinotecan (CAMPTOSAR) 205 mg in dextrose (D5W) 5 % 510.3 mL chemo IVPB  -     Cancel: atropine injection 0.25 mg  -     Cancel: fluorouracil (ADRUCIL) 3,930 mg in sodium chloride 0.9 % 78.6 mL chemo infusion - FOR HOME USE  -     Cancel: Hydrocortisone Sod Suc (PF) (Solu-CORTEF) injection 100 mg  -     Cancel: diphenhydrAMINE (BENADRYL) injection 50 mg  -     Cancel: famotidine (PEPCID) injection 20 mg  -     pegfilgrastim (NEULASTA ONPRO) on-body injector 6 mg            Patient Follow Up: 1 month    Patient was given instructions and counseling regarding his condition or for health maintenance advice. Please see specific information pulled into the AVS if appropriate.     Gene Blanchard MD    6/3/2025

## 2025-06-03 NOTE — ASSESSMENT & PLAN NOTE
Patient is receiving adjuvant modified FOLFIRINOX.  He is due for his 12th and final cycle.  And increased fatigue and poor appetite with the last cycle.  Lab work today is adequate for treatment.  Proceed with cycle 12 as planned.  This will complete his chemotherapy.  I will see him back in 4 to 6 weeks to ensure that all acute toxicities are resolving with lab work and restaging scans prior.

## 2025-06-03 NOTE — ASSESSMENT & PLAN NOTE
Swelling of both lower extremities but left more so than right.  In part related to oncotic pressure with decreased total protein and albumin.  We discussed increasing protein in the diet.  I will ultrasound the left lower extremity to assess for thromboembolic events.

## 2025-06-03 NOTE — PROGRESS NOTES
Outpatient Nutrition Oncology Follow Up     Patient Name: Josue Stern  YOB: 1955  MRN: [id number]        Reason for Consult:  F/U      Diagnosis:  pancreatic Ca       Current Treatment:  mFOLFIRINOX (last tx today)     Today's Weight:  63.1 kg     Weight Change:   .5 kg gain in the past month     Nutrition-related concerns:  decreased appetite- Zyprexa Rx refilled     Current PO intake:  consuming a variety of foods, including some fatty foods and high fiber foods- pt reports he can tolerate almost all foods     Current Nutrition Supplement intake: drinks a variety of ONS (Premier protein shakes, Boost Plus) but not consistently      Consult or Intervention:   Oncology RD received communication from pt's clinical team in [place] to inquire on his nutritional status.  Provided the information to his team over the past few months.  Pt obtained Rx for Creon since last tx.  Wt gain noted in the past month.  He denies symptoms of steatorrhea.  Reports he had some diarrhea after consuming vegetable soup at work, but not a consistent problem.  Pt can normally tolerate high fiber foods, including beans.  He is not sure why he could not tolerate the soup.  Pt is happy that today is his last chemotherapy.  Provided several samples of Boost [facility].     Nutrition Diagnosis: Unintentional weight loss related to increased nutrient needs due to catabolic disease as evidenced by physiological causes increasing nutrient needs., hypermetabolic state., and GI dysfunction.     Plan: Will follow up per oncology nutrition protocol

## 2025-06-04 ENCOUNTER — HOSPITAL ENCOUNTER (EMERGENCY)
Facility: HOSPITAL | Age: 70
Discharge: HOME | End: 2025-06-04
Attending: EMERGENCY MEDICINE
Payer: MEDICARE

## 2025-06-04 ENCOUNTER — APPOINTMENT (OUTPATIENT)
Dept: GENERAL RADIOLOGY | Facility: HOSPITAL | Age: 70
End: 2025-06-04
Payer: MEDICARE

## 2025-06-04 VITALS
WEIGHT: 139 LBS | TEMPERATURE: 98.9 F | HEIGHT: 66 IN | SYSTOLIC BLOOD PRESSURE: 166 MMHG | HEART RATE: 73 BPM | BODY MASS INDEX: 22.34 KG/M2 | RESPIRATION RATE: 18 BRPM | OXYGEN SATURATION: 98 % | DIASTOLIC BLOOD PRESSURE: 103 MMHG

## 2025-06-04 DIAGNOSIS — R53.1 GENERALIZED WEAKNESS: ICD-10-CM

## 2025-06-04 DIAGNOSIS — C25.9 MALIGNANT NEOPLASM OF PANCREAS, UNSPECIFIED LOCATION OF MALIGNANCY: Primary | ICD-10-CM

## 2025-06-04 LAB
ALBUMIN SERPL-MCNC: 3.5 G/DL (ref 3.5–5.2)
ALBUMIN/GLOB SERPL: 1.2 G/DL
ALP SERPL-CCNC: 268 U/L (ref 39–117)
ALT SERPL W P-5'-P-CCNC: 24 U/L (ref 1–41)
ANION GAP SERPL CALCULATED.3IONS-SCNC: 13.7 MMOL/L (ref 5–15)
ANISOCYTOSIS BLD QL: NORMAL
AST SERPL-CCNC: 46 U/L (ref 1–40)
BASOPHILS # BLD AUTO: 0.11 10*3/MM3 (ref 0–0.2)
BASOPHILS NFR BLD AUTO: 0.6 % (ref 0–1.5)
BILIRUB SERPL-MCNC: 0.3 MG/DL (ref 0–1.2)
BILIRUB UR QL STRIP: NEGATIVE
BUN SERPL-MCNC: 10.7 MG/DL (ref 8–23)
BUN/CREAT SERPL: 10.8 (ref 7–25)
CALCIUM SPEC-SCNC: 8.4 MG/DL (ref 8.6–10.5)
CHLORIDE SERPL-SCNC: 104 MMOL/L (ref 98–107)
CLARITY UR: CLEAR
CO2 SERPL-SCNC: 22.3 MMOL/L (ref 22–29)
COLOR UR: YELLOW
CREAT SERPL-MCNC: 0.99 MG/DL (ref 0.76–1.27)
DEPRECATED RDW RBC AUTO: 54.7 FL (ref 37–54)
EGFRCR SERPLBLD CKD-EPI 2021: 81.9 ML/MIN/1.73
EOSINOPHIL # BLD AUTO: 0 10*3/MM3 (ref 0–0.4)
EOSINOPHIL NFR BLD AUTO: 0 % (ref 0.3–6.2)
ERYTHROCYTE [DISTWIDTH] IN BLOOD BY AUTOMATED COUNT: 17.9 % (ref 12.3–15.4)
GLOBULIN UR ELPH-MCNC: 3 GM/DL
GLUCOSE SERPL-MCNC: 111 MG/DL (ref 65–99)
GLUCOSE UR STRIP-MCNC: NEGATIVE MG/DL
HCT VFR BLD AUTO: 33.4 % (ref 37.5–51)
HGB BLD-MCNC: 10.6 G/DL (ref 13–17.7)
HGB UR QL STRIP.AUTO: NEGATIVE
HOLD SPECIMEN: NORMAL
HOLD SPECIMEN: NORMAL
IMM GRANULOCYTES # BLD AUTO: 0.73 10*3/MM3 (ref 0–0.05)
IMM GRANULOCYTES NFR BLD AUTO: 4.2 % (ref 0–0.5)
KETONES UR QL STRIP: NEGATIVE
LEUKOCYTE ESTERASE UR QL STRIP.AUTO: NEGATIVE
LIPASE SERPL-CCNC: 6 U/L (ref 13–60)
LYMPHOCYTES # BLD AUTO: 0.57 10*3/MM3 (ref 0.7–3.1)
LYMPHOCYTES NFR BLD AUTO: 3.3 % (ref 19.6–45.3)
MCH RBC QN AUTO: 28 PG (ref 26.6–33)
MCHC RBC AUTO-ENTMCNC: 31.7 G/DL (ref 31.5–35.7)
MCV RBC AUTO: 88.4 FL (ref 79–97)
MONOCYTES # BLD AUTO: 1.63 10*3/MM3 (ref 0.1–0.9)
MONOCYTES NFR BLD AUTO: 9.3 % (ref 5–12)
NEUTROPHILS NFR BLD AUTO: 14.41 10*3/MM3 (ref 1.7–7)
NEUTROPHILS NFR BLD AUTO: 82.6 % (ref 42.7–76)
NITRITE UR QL STRIP: NEGATIVE
NRBC BLD AUTO-RTO: 0 /100 WBC (ref 0–0.2)
PH UR STRIP.AUTO: 6.5 [PH] (ref 5–8)
PLATELET # BLD AUTO: 167 10*3/MM3 (ref 140–450)
PMV BLD AUTO: 10.8 FL (ref 6–12)
POTASSIUM SERPL-SCNC: 4 MMOL/L (ref 3.5–5.2)
PROT SERPL-MCNC: 6.5 G/DL (ref 6–8.5)
PROT UR QL STRIP: NEGATIVE
QT INTERVAL: 354 MS
QTC INTERVAL: 382 MS
RBC # BLD AUTO: 3.78 10*6/MM3 (ref 4.14–5.8)
SMALL PLATELETS BLD QL SMEAR: ADEQUATE
SODIUM SERPL-SCNC: 140 MMOL/L (ref 136–145)
SP GR UR STRIP: 1.01 (ref 1–1.03)
UROBILINOGEN UR QL STRIP: NORMAL
WBC MORPH BLD: NORMAL
WBC NRBC COR # BLD AUTO: 17.45 10*3/MM3 (ref 3.4–10.8)
WHOLE BLOOD HOLD COAG: NORMAL
WHOLE BLOOD HOLD SPECIMEN: NORMAL

## 2025-06-04 PROCEDURE — 80053 COMPREHEN METABOLIC PANEL: CPT | Performed by: NURSE PRACTITIONER

## 2025-06-04 PROCEDURE — 83690 ASSAY OF LIPASE: CPT | Performed by: EMERGENCY MEDICINE

## 2025-06-04 PROCEDURE — 87040 BLOOD CULTURE FOR BACTERIA: CPT | Performed by: EMERGENCY MEDICINE

## 2025-06-04 PROCEDURE — 81003 URINALYSIS AUTO W/O SCOPE: CPT | Performed by: NURSE PRACTITIONER

## 2025-06-04 PROCEDURE — 36415 COLL VENOUS BLD VENIPUNCTURE: CPT | Performed by: EMERGENCY MEDICINE

## 2025-06-04 PROCEDURE — 25810000003 SODIUM CHLORIDE 0.9 % SOLUTION

## 2025-06-04 PROCEDURE — 85025 COMPLETE CBC W/AUTO DIFF WBC: CPT | Performed by: NURSE PRACTITIONER

## 2025-06-04 PROCEDURE — 85007 BL SMEAR W/DIFF WBC COUNT: CPT | Performed by: NURSE PRACTITIONER

## 2025-06-04 PROCEDURE — 93005 ELECTROCARDIOGRAM TRACING: CPT

## 2025-06-04 PROCEDURE — 71045 X-RAY EXAM CHEST 1 VIEW: CPT

## 2025-06-04 PROCEDURE — 99284 EMERGENCY DEPT VISIT MOD MDM: CPT

## 2025-06-04 RX ADMIN — SODIUM CHLORIDE 1000 ML: 9 INJECTION, SOLUTION INTRAVENOUS at 16:21

## 2025-06-04 NOTE — ED NOTES
Patient keeps leaving his room and demanding to see the doctor, Dr. Prieto has been notified, I have been in room multiple times offering everything I can while explaining the delay with his labs, patient is very restless, demanding, and impatient at this time, I also explained to the patient that if he wants to leave I totally understand I just need him to sign AMA papers and I will need to remove his IV, patient has been educated that if he reaches the decision to leave to hit his call light so that I can be notified

## 2025-06-04 NOTE — ED PROVIDER NOTES
Time: 2:27 PM EDT  Date of encounter:  6/4/2025  Independent Historian/Clinical History and Information was obtained by:   Patient    History is limited by: N/A    Chief Complaint   Patient presents with    Weakness - Generalized    Fall         History of Present Illness:  Patient is a 69 y.o. year old male who presents to the emergency department for evaluation of weakness and fall. Patient states he is on chemo, last treatment was yesterday. Patient fell at home three times and feels more weak than usual.  He does relate this is being generalized weakness and nonfocal.  He has a mild headache but denies any significant head trauma from his falls today.  No loss of consciousness.  Patient denies speech difficulty or vision change.  Family states he did not have any facial droop and seemed normal.  No reported confusion.  Patient states he had not had his morning medications yet when this weakness and falling occurred.  He states afterwards he took his medications and now feels much improved.  He denies any current residual symptoms and is hopeful to go home.        Diagnosed with pancreatic cancer with surgical removal in January. Stage 2. Patient states he is done with chemo next week.   Patient Care Team  Primary Care Provider: Forrest Duong MD    Past Medical History:     Allergies   Allergen Reactions    Aspirin Nausea Only     Past Medical History:   Diagnosis Date    Allergies     Asthma     GERD (gastroesophageal reflux disease)     Hypertension     Malignant neoplasm of pancreas 09/05/2024     Past Surgical History:   Procedure Laterality Date    HERNIA REPAIR       History reviewed. No pertinent family history.    Home Medications:  Prior to Admission medications    Medication Sig Start Date End Date Taking? Authorizing Provider   albuterol sulfate  (90 Base) MCG/ACT inhaler Inhale 2 puffs Every 4 (Four) Hours As Needed for Wheezing. 12/1/22   Yuri Patel, DO   amLODIPine (Norvasc) 5 MG  tablet Take 1 tablet by mouth Daily.    William Dias MD   buprenorphine-naloxone (Suboxone) 8-2 MG film film Place 1 film under the tongue 2 (Two) Times a Day.    William Dias MD   busPIRone (BUSPAR) 15 MG tablet Take 1 tablet by mouth 3 (Three) Times a Day.    William Dias MD   cetirizine (ZyrTEC Allergy) 10 MG tablet Take 1 tablet by mouth Daily.  Patient not taking: Reported on 5/6/2025    William Dias MD   DULoxetine (CYMBALTA) 30 MG capsule TAKE 1 CAPSULE BY MOUTH DAILY 2/3/25   Gene Blanchard MD   gabapentin (NEURONTIN) 800 MG tablet Take 1 tablet by mouth 3 (Three) Times a Day.    William Dias MD   ibuprofen (ADVIL,MOTRIN) 800 MG tablet Take 1 tablet by mouth Every 8 (Eight) Hours As Needed for Mild Pain. 12/3/24   Gene Blanchard MD   lidocaine-prilocaine (EMLA) 2.5-2.5 % cream Apply 1 Application topically to the appropriate area as directed As Needed for Injection Site Pain. 9/5/24   Gene Blanchard MD   OLANZapine (zyPREXA) 5 MG tablet Take 1 tablet by mouth Every Night. 6/3/25   Gene Blanchard MD   ondansetron (ZOFRAN) 8 MG tablet Take 1 tablet by mouth 3 (Three) Times a Day As Needed for Nausea or Vomiting. 4/8/25   Gene Blanchard MD   pancrelipase, Lip-Prot-Amyl, (CREON) 10413-04037 units capsule delayed-release particles capsule Take 1 capsule by mouth 3 (Three) Times a Day With Meals. 4/22/25   Gene Blanchard MD   prazosin (MINIPRESS) 1 MG capsule Take 1 capsule by mouth Every Night.  Patient not taking: Reported on 5/6/2025    William Dias MD   propranolol LA (Inderal LA) 60 MG 24 hr capsule Take 1 capsule by mouth Daily.  Patient not taking: Reported on 5/6/2025    William Dias MD   traZODone (DESYREL) 100 MG tablet Daily.    William Dias MD        Social History:   Social History     Tobacco Use    Smoking status: Every Day     Current packs/day: 0.50     Types: Cigarettes    Smokeless tobacco: Never   Vaping  "Use    Vaping status: Every Day   Substance Use Topics    Alcohol use: Never    Drug use: Never         Review of Systems:  Review of Systems   Constitutional:  Positive for activity change, appetite change and fatigue. Negative for chills and fever.   HENT:  Negative for congestion, rhinorrhea and sore throat.    Eyes:  Negative for pain and visual disturbance.   Respiratory:  Negative for apnea, cough, chest tightness and shortness of breath.    Cardiovascular:  Negative for chest pain and palpitations.   Gastrointestinal:  Negative for abdominal pain, diarrhea, nausea and vomiting.   Genitourinary:  Negative for difficulty urinating and dysuria.   Musculoskeletal:  Negative for joint swelling and myalgias.   Skin:  Negative for color change.   Neurological:  Positive for dizziness, weakness and light-headedness. Negative for seizures and headaches.   Psychiatric/Behavioral: Negative.     All other systems reviewed and are negative.       Physical Exam:  BP (!) 166/103 (BP Location: Right arm, Patient Position: Sitting)   Pulse 73   Temp 98.9 °F (37.2 °C) (Oral)   Resp 18   Ht 167.6 cm (66\")   Wt 63 kg (139 lb)   SpO2 98%   BMI 22.44 kg/m²         Physical Exam  Vitals and nursing note reviewed.   Constitutional:       General: He is awake. He is not in acute distress.     Appearance: Normal appearance. He is not toxic-appearing.   HENT:      Head: Normocephalic and atraumatic.      Jaw: There is normal jaw occlusion.      Nose: Nose normal.      Mouth/Throat:      Mouth: Mucous membranes are moist.   Eyes:      General: Lids are normal.      Extraocular Movements: Extraocular movements intact.      Conjunctiva/sclera: Conjunctivae normal.      Pupils: Pupils are equal, round, and reactive to light.   Cardiovascular:      Rate and Rhythm: Normal rate and regular rhythm.      Pulses: Normal pulses.      Heart sounds: Normal heart sounds.   Pulmonary:      Effort: Pulmonary effort is normal. No respiratory " distress.      Breath sounds: Normal breath sounds. No wheezing, rhonchi or rales.   Abdominal:      General: Abdomen is flat. Bowel sounds are normal.      Palpations: Abdomen is soft.      Tenderness: There is no abdominal tenderness. There is no guarding or rebound.      Comments: No rigidity   Musculoskeletal:         General: No tenderness. Normal range of motion.      Cervical back: Normal range of motion and neck supple.      Right lower leg: No edema.      Left lower leg: No edema.   Skin:     General: Skin is warm and dry.      Coloration: Skin is not jaundiced.   Neurological:      General: No focal deficit present.      Mental Status: He is alert and oriented to person, place, and time. Mental status is at baseline.      Cranial Nerves: No cranial nerve deficit.      Sensory: No sensory deficit.      Motor: No weakness.      Coordination: Coordination normal.   Psychiatric:         Mood and Affect: Mood normal.                            Medical Decision Making:      Comorbidities that affect care:    Hypertension, GERD, asthma    External Notes reviewed:    Previous Clinic Note: Oncology office visit yesterday with Dr. Padilla.  Description: Malignant neoplasm of head of pancreas      The following orders were placed and all results were independently analyzed by me:  Orders Placed This Encounter   Procedures    Blood Culture - Blood,    Blood Culture - Blood,    XR Chest 1 View    Comprehensive Metabolic Panel    [place] Draw    Urinalysis With Microscopic If Indicated (No Culture) - Urine, Clean Catch    CBC Auto Differential    Lipase    Scan Slide    ECG 12 Lead Syncope    CBC & Differential    Green Top (Gel)    Lavender Top    Gold Top - SST    Light Blue Top       Medications Given in the Emergency Department:  Medications   sodium chloride 0.9 % bolus 1,000 mL (0 mL Intravenous Stopped 6/4/25 6781)        ED Course:    The patient was initially evaluated in the triage area where orders were  placed. The patient was later dispositioned by Javier Prieto MD.      The patient was advised to stay for completion of workup which includes but is not limited to communication of labs and radiological results, reassessment and plan. The patient was advised that leaving prior to disposition by a provider could result in critical findings that are not communicated to the patient.     ED Course as of 06/04/25 2319 Wed Jun 04, 2025 2315 I have personally interpreted the EKG today and it shows no evidence of any acute ischemia or heart arrhythmia. [RP]      ED Course User Index  [RP] Javier Prieto MD       Labs:    Lab Results (last 24 hours)       Procedure Component Value Units Date/Time    CBC & Differential [[id number]]  (Abnormal) Collected: 06/04/25 1436    Specimen: Blood from Arm, Left Updated: 06/04/25 1535    Narrative:      The following orders were created for panel order CBC & Differential.  Procedure                               Abnormality         Status                     ---------                               -----------         ------                     CBC Auto Differential[[id number]]        Abnormal            Final result               Scan Slide[[id number]]                                       Final result                 Please view results for these tests on the individual orders.    CBC Auto Differential [[id number]]  (Abnormal) Collected: 06/04/25 1436    Specimen: Blood from Arm, Left Updated: 06/04/25 1535     WBC 17.45 10*3/mm3      RBC 3.78 10*6/mm3      Hemoglobin 10.6 g/dL      Hematocrit 33.4 %      MCV 88.4 fL      MCH 28.0 pg      MCHC 31.7 g/dL      RDW 17.9 %      RDW-SD 54.7 fl      MPV 10.8 fL      Platelets 167 10*3/mm3      Neutrophil % 82.6 %      Lymphocyte % 3.3 %      Monocyte % 9.3 %      Eosinophil % 0.0 %      Basophil % 0.6 %      Immature Grans % 4.2 %      Neutrophils, Absolute 14.41 10*3/mm3      Lymphocytes, Absolute 0.57 10*3/mm3      Monocytes,  Absolute 1.63 10*3/mm3      Eosinophils, Absolute 0.00 10*3/mm3      Basophils, Absolute 0.11 10*3/mm3      Immature Grans, Absolute 0.73 10*3/mm3      nRBC 0.0 /100 WBC     Narrative:      Appended report. These results have been appended to a previously verified report.    Scan Slide [[id number]] Collected: 06/04/25 1436    Specimen: Blood from Arm, Left Updated: 06/04/25 1535     Anisocytosis Slight/1+     WBC Morphology Normal     Platelet Estimate Adequate    Urinalysis With Microscopic If Indicated (No Culture) - Urine, Clean Catch [[id number]]  (Normal) Collected: 06/04/25 1540    Specimen: Urine, Clean Catch Updated: 06/04/25 1602     Color, UA Yellow     Appearance, UA Clear     pH, UA 6.5     Specific Gravity, UA 1.015     Glucose, UA Negative     Ketones, UA Negative     Bilirubin, UA Negative     Blood, UA Negative     Protein, UA Negative     Leuk Esterase, UA Negative     Nitrite, UA Negative     Urobilinogen, UA 0.2 E.U./dL    Narrative:      Urine microscopic not indicated.    Blood Culture - Blood, Arm, Right [[id number]] Collected: 06/04/25 1629    Specimen: Blood from Arm, Right Updated: 06/04/25 1633    Blood Culture - Blood, Arm, Left [[id number]] Collected: 06/04/25 1629    Specimen: Blood from Arm, Left Updated: 06/04/25 1633    Comprehensive Metabolic Panel [[id number]]  (Abnormal) Collected: 06/04/25 1740    Specimen: Blood from Arm, Left Updated: 06/04/25 1805     Glucose 111 mg/dL      BUN 10.7 mg/dL      Creatinine 0.99 mg/dL      Sodium 140 mmol/L      Potassium 4.0 mmol/L      Chloride 104 mmol/L      CO2 22.3 mmol/L      Calcium 8.4 mg/dL      Total Protein 6.5 g/dL      Albumin 3.5 g/dL      ALT (SGPT) 24 U/L      AST (SGOT) 46 U/L      Alkaline Phosphatase 268 U/L      Total Bilirubin 0.3 mg/dL      Globulin 3.0 gm/dL      A/G Ratio 1.2 g/dL      BUN/Creatinine Ratio 10.8     Anion Gap 13.7 mmol/L      eGFR 81.9 mL/min/1.73     Narrative:      GFR Categories in Chronic Kidney Disease  (CKD)              GFR Category          GFR (mL/min/1.73)    Interpretation  G1                    90 or greater        Normal or high (1)  G2                    60-89                Mild decrease (1)  G3a                   45-59                Mild to moderate decrease  G3b                   30-44                Moderate to severe decrease  G4                    15-29                Severe decrease  G5                    14 or less           Kidney failure    (1)In the absence of evidence of kidney disease, neither GFR category G1 or G2 fulfill the criteria for CKD.    eGFR calculation 2021 CKD-EPI creatinine equation, which does not include race as a factor    Lipase [[id number]]  (Abnormal) Collected: 06/04/25 1740    Specimen: Blood from Arm, Left Updated: 06/04/25 1804     Lipase 6 U/L              Imaging:    XR Chest 1 View  Result Date: 6/4/2025  XR CHEST 1 VW Date of Exam: 6/4/2025 3:07 PM EDT Indication: weakness Comparison: 12/1/2022 Findings: Cardiomediastinal silhouette is unremarkable. There is a right-sided chest port, tip in the mid SVC. No airspace disease, pneumothorax, nor pleural effusion. No acute osseous abnormality identified.     Impression: No active disease. Electronically Signed: Ramiro Rodriguez MD  6/4/2025 3:23 PM EDT  Workstation ID: [id number]        Differential Diagnosis and Discussion:      Weakness: Based on the patient's history, signs, and symptoms, the diffential diagnosis includes but is not limited to meningitis, stroke, sepsis, subarachnoid hemorrhage, intracranial bleeding, encephalitis, acute uti, dehydration, MS, myasthenia gravis, Guillan [place], migraine variant, neuromuscular disorders vertigo, electrolyte imbalance, and metabolic disorders.    PROCEDURES:    Labs were collected in the emergency department and all labs were reviewed and interpreted by me.  X-ray were performed in the emergency department and all X-ray impressions were independently interpreted by me.  An EKG  was performed and the EKG was interpreted by me.    ECG 12 Lead Syncope   Preliminary Result   HEART RATE=70  bpm   RR Cpvpbmjh=127  ms   TX Qewwbmac=200  ms   P Horizontal Axis=0  deg   P Front Axis=59  deg   QRSD Interval=91  ms   QT Kqflcwdm=563  ms   XCeK=292  ms   QRS Axis=2  deg   T Wave Axis=40  deg   - OTHERWISE NORMAL ECG -   Sinus rhythm   Atrial premature complex   Date and Time of Study:2025-06-04 14:48:26           Procedures    MDM     Amount and/or Complexity of Data Reviewed  Decide to obtain previous medical records or to obtain history from someone other than the patient: yes                     Patient Care Considerations:    SEPSIS was considered but is NOT present in the emergency department as SIRS criteria is not present.  The only SIRS criteria is leukocytosis.  The patient is not tachypneic or tachycardic.   he is afebrile.  He has no confirmed or presumed source of bacterial infection at this time.  He is asymptomatic at the time of discharge.    Consultants/Shared Management Plan:    None    Social Determinants of Health:    Patient is independent, reliable, and has access to care.       Disposition and Care Coordination:    I considered admission however the patient does not meet sepsis criteria as the only SIRS criteria is leukocytosis.  The patient is not tachypneic or tachycardic.  He is afebrile.  He is asymptomatic and his only symptom was generalized weakness.  His symptoms have fully resolved in the emergency department and he denies ever having felt any signs of infection.  The patient understands that his white blood cell count is elevated and the importance of following up with his primary care provider along with oncologist regarding pending blood cultures.  He was adamant about being discharged to soon as possible and declines admission.  He understands strong return warnings and promises to return at the first sign of any infection.        I have explained the patient´s  condition, diagnoses and treatment plan based on the information available to me at this time. I have answered questions and addressed any concerns. The patient has a good  understanding of the patient´s diagnosis, condition, and treatment plan as can be expected at this point. The vital signs have been stable. The patient´s condition is stable and appropriate for discharge from the emergency department.      The patient will pursue further outpatient evaluation with the primary care physician or other designated or consulting physician as outlined in the discharge instructions. They are agreeable to this plan of care and follow-up instructions have been explained in detail. The patient has received these instructions in written format and has expressed an understanding of the discharge instructions. The patient is aware that any significant change in condition or worsening of symptoms should prompt an immediate return to this or the closest emergency department or call to 911.    Final diagnoses:   Malignant neoplasm of pancreas, unspecified location of malignancy   Generalized weakness        ED Disposition       ED Disposition   Discharge    Condition   Stable    Comment   --               This medical record created using voice recognition software.             Javier Prieto MD  06/04/25 1340

## 2025-06-04 NOTE — DISCHARGE INSTRUCTIONS
Stay well-hydrated.  Return to the emergency department immediately for worsening of symptoms, fever.  You do have an elevated white blood cell count and blood cultures pending. With your recent chemotherapy your primary care provider should follow-up on your blood culture results to ensure you do not have any bloodstream infection.  Today you voiced no concern for any obvious infection and your workup was mostly reassuring from that standpoint as your urine shows no infection and your chest x-ray shows no pneumonia.  Stay very well-hydrated.

## 2025-06-05 ENCOUNTER — HOSPITAL ENCOUNTER (OUTPATIENT)
Dept: ONCOLOGY | Facility: HOSPITAL | Age: 70
Discharge: HOME | End: 2025-06-05
Admitting: INTERNAL MEDICINE
Payer: MEDICARE

## 2025-06-05 VITALS
RESPIRATION RATE: 18 BRPM | OXYGEN SATURATION: 97 % | SYSTOLIC BLOOD PRESSURE: 116 MMHG | TEMPERATURE: 98 F | HEART RATE: 71 BPM | DIASTOLIC BLOOD PRESSURE: 69 MMHG

## 2025-06-05 DIAGNOSIS — C25.0 MALIGNANT NEOPLASM OF HEAD OF PANCREAS: Primary | ICD-10-CM

## 2025-06-05 PROCEDURE — 25010000002 HEPARIN LOCK FLUSH PER 10 UNITS: Performed by: INTERNAL MEDICINE

## 2025-06-05 PROCEDURE — 96377 APPLICATON ON-BODY INJECTOR: CPT

## 2025-06-05 PROCEDURE — 25010000002 PEGFILGRASTIM 6 MG/0.6ML PREFILLED SYRINGE KIT: Performed by: INTERNAL MEDICINE

## 2025-06-05 RX ORDER — HEPARIN SODIUM (PORCINE) LOCK FLUSH IV SOLN 100 UNIT/ML 100 UNIT/ML
500 SOLUTION INTRAVENOUS AS NEEDED
Status: DISCONTINUED | OUTPATIENT
Start: 2025-06-05 | End: 2025-06-06 | Stop reason: HOSPADM

## 2025-06-05 RX ORDER — SODIUM CHLORIDE 0.9 % (FLUSH) 0.9 %
20 SYRINGE (ML) INJECTION AS NEEDED
OUTPATIENT
Start: 2025-06-05

## 2025-06-05 RX ORDER — HEPARIN SODIUM (PORCINE) LOCK FLUSH IV SOLN 100 UNIT/ML 100 UNIT/ML
500 SOLUTION INTRAVENOUS AS NEEDED
OUTPATIENT
Start: 2025-06-05

## 2025-06-05 RX ORDER — SODIUM CHLORIDE 0.9 % (FLUSH) 0.9 %
20 SYRINGE (ML) INJECTION AS NEEDED
Status: DISCONTINUED | OUTPATIENT
Start: 2025-06-05 | End: 2025-06-06 | Stop reason: HOSPADM

## 2025-06-05 RX ADMIN — Medication 20 ML: at 13:26

## 2025-06-05 RX ADMIN — PEGFILGRASTIM 6 MG: KIT SUBCUTANEOUS at 13:26

## 2025-06-05 RX ADMIN — HEPARIN 500 UNITS: 100 SYRINGE at 13:26

## 2025-06-09 LAB
BACTERIA SPEC AEROBE CULT: NORMAL
BACTERIA SPEC AEROBE CULT: NORMAL

## 2025-06-12 ENCOUNTER — HOSPITAL ENCOUNTER (OUTPATIENT)
Dept: ULTRASOUND IMAGING | Facility: HOSPITAL | Age: 70
Discharge: HOME | End: 2025-06-12
Admitting: INTERNAL MEDICINE
Payer: MEDICARE

## 2025-06-12 DIAGNOSIS — M79.89 SWELLING OF LEFT LOWER EXTREMITY: ICD-10-CM

## 2025-06-12 PROCEDURE — 93971 EXTREMITY STUDY: CPT

## 2025-06-24 LAB
QT INTERVAL: 354 MS
QTC INTERVAL: 382 MS

## 2025-06-26 ENCOUNTER — TELEPHONE (OUTPATIENT)
Dept: ONCOLOGY | Facility: HOSPITAL | Age: 70
End: 2025-06-26
Payer: MEDICARE

## 2025-06-26 NOTE — TELEPHONE ENCOUNTER
The infusion , Kenzie BATISTA, contacted Mr. Stern's sister this morning and advised they can do the CT scan this afternoon at 1400, however, Mr. Stern could not make it. His scan has been rescheduled to 7/1/25 and his follow up rescheduled to 7/7/25. OSW support remains available.

## 2025-06-26 NOTE — TELEPHONE ENCOUNTER
OSW received a phone call from Mr. Stern's sister, Alena, this morning advising that Mr. Stern had received a phone call about an appointment on 7/1/25, but didn't understand what this was for. She is calling on his behalf to clarify if he is needing additional chemotherapy treatment. OSW advised that this is a one month follow up visit post treatment. Explained that he was also scheduled for a CT scan this morning. She reports he was not aware of this appointment and therefore did not attend. Advised that OSW will request our  to please reschedule his scan and we may need to push his follow up visit with the oncologist out as well. She v/u.    Additionally, his sister reports he's had lower extremity swelling in his leg and foot. He underwent a US venous doppler on 6/12/25 and they have not received the results of this yet. They are requesting a call from the nurse to review results, reporting he still has swelling.     The sister is requesting she please be contacted regarding the rescheduled appointments and the results of his US venous doppler, explaining that Mr. Stern is having a hard time understanding everything. She may be reached at [phone].

## 2025-07-01 ENCOUNTER — APPOINTMENT (OUTPATIENT)
Dept: ONCOLOGY | Facility: HOSPITAL | Age: 70
End: 2025-07-01
Payer: MEDICARE

## 2025-07-01 ENCOUNTER — LAB (OUTPATIENT)
Facility: HOSPITAL | Age: 70
End: 2025-07-01
Payer: MEDICARE

## 2025-07-01 ENCOUNTER — HOSPITAL ENCOUNTER (OUTPATIENT)
Dept: CT IMAGING | Facility: HOSPITAL | Age: 70
Discharge: HOME | End: 2025-07-01
Payer: MEDICARE

## 2025-07-01 DIAGNOSIS — C25.0 MALIGNANT NEOPLASM OF HEAD OF PANCREAS: ICD-10-CM

## 2025-07-01 LAB
ALBUMIN SERPL-MCNC: 2.6 G/DL (ref 3.5–5.2)
ALBUMIN/GLOB SERPL: 1 G/DL
ALP SERPL-CCNC: 130 U/L (ref 39–117)
ALT SERPL W P-5'-P-CCNC: 16 U/L (ref 1–41)
ANION GAP SERPL CALCULATED.3IONS-SCNC: 6.7 MMOL/L (ref 5–15)
AST SERPL-CCNC: 33 U/L (ref 1–40)
BASOPHILS # BLD AUTO: 0.04 10*3/MM3 (ref 0–0.2)
BASOPHILS NFR BLD AUTO: 0.8 % (ref 0–1.5)
BILIRUB SERPL-MCNC: 0.3 MG/DL (ref 0–1.2)
BUN SERPL-MCNC: 9.2 MG/DL (ref 8–23)
BUN/CREAT SERPL: 10.2 (ref 7–25)
CALCIUM SPEC-SCNC: 7.6 MG/DL (ref 8.6–10.5)
CANCER AG19-9 SERPL-ACNC: 440 U/ML
CHLORIDE SERPL-SCNC: 106 MMOL/L (ref 98–107)
CO2 SERPL-SCNC: 22.3 MMOL/L (ref 22–29)
CREAT SERPL-MCNC: 0.9 MG/DL (ref 0.76–1.27)
DEPRECATED RDW RBC AUTO: 63.8 FL (ref 37–54)
EGFRCR SERPLBLD CKD-EPI 2021: 91.3 ML/MIN/1.73
EOSINOPHIL # BLD AUTO: 0.04 10*3/MM3 (ref 0–0.4)
EOSINOPHIL NFR BLD AUTO: 0.8 % (ref 0.3–6.2)
ERYTHROCYTE [DISTWIDTH] IN BLOOD BY AUTOMATED COUNT: 19.8 % (ref 12.3–15.4)
GLOBULIN UR ELPH-MCNC: 2.5 GM/DL
GLUCOSE SERPL-MCNC: 90 MG/DL (ref 65–99)
HCT VFR BLD AUTO: 29.9 % (ref 37.5–51)
HGB BLD-MCNC: 9.4 G/DL (ref 13–17.7)
IMM GRANULOCYTES # BLD AUTO: 0.02 10*3/MM3 (ref 0–0.05)
IMM GRANULOCYTES NFR BLD AUTO: 0.4 % (ref 0–0.5)
LYMPHOCYTES # BLD AUTO: 1.67 10*3/MM3 (ref 0.7–3.1)
LYMPHOCYTES NFR BLD AUTO: 35.1 % (ref 19.6–45.3)
MCH RBC QN AUTO: 28.3 PG (ref 26.6–33)
MCHC RBC AUTO-ENTMCNC: 31.4 G/DL (ref 31.5–35.7)
MCV RBC AUTO: 90.1 FL (ref 79–97)
MONOCYTES # BLD AUTO: 0.56 10*3/MM3 (ref 0.1–0.9)
MONOCYTES NFR BLD AUTO: 11.8 % (ref 5–12)
NEUTROPHILS NFR BLD AUTO: 2.43 10*3/MM3 (ref 1.7–7)
NEUTROPHILS NFR BLD AUTO: 51.1 % (ref 42.7–76)
NRBC BLD AUTO-RTO: 0 /100 WBC (ref 0–0.2)
PLATELET # BLD AUTO: 142 10*3/MM3 (ref 140–450)
PMV BLD AUTO: 10.6 FL (ref 6–12)
POTASSIUM SERPL-SCNC: 4.6 MMOL/L (ref 3.5–5.2)
PROT SERPL-MCNC: 5.1 G/DL (ref 6–8.5)
RBC # BLD AUTO: 3.32 10*6/MM3 (ref 4.14–5.8)
SODIUM SERPL-SCNC: 135 MMOL/L (ref 136–145)
WBC NRBC COR # BLD AUTO: 4.76 10*3/MM3 (ref 3.4–10.8)

## 2025-07-01 PROCEDURE — 25510000001 IOPAMIDOL PER 1 ML: Performed by: INTERNAL MEDICINE

## 2025-07-01 PROCEDURE — 74177 CT ABD & PELVIS W/CONTRAST: CPT

## 2025-07-01 PROCEDURE — 80053 COMPREHEN METABOLIC PANEL: CPT

## 2025-07-01 PROCEDURE — 36415 COLL VENOUS BLD VENIPUNCTURE: CPT

## 2025-07-01 PROCEDURE — 71260 CT THORAX DX C+: CPT

## 2025-07-01 PROCEDURE — 85025 COMPLETE CBC W/AUTO DIFF WBC: CPT

## 2025-07-01 PROCEDURE — 86301 IMMUNOASSAY TUMOR CA 19-9: CPT

## 2025-07-01 RX ORDER — IOPAMIDOL 755 MG/ML
100 INJECTION, SOLUTION INTRAVASCULAR
Status: COMPLETED | OUTPATIENT
Start: 2025-07-01 | End: 2025-07-01

## 2025-07-01 RX ADMIN — IOPAMIDOL 100 ML: 755 INJECTION, SOLUTION INTRAVENOUS at 12:57

## 2025-07-04 ENCOUNTER — APPOINTMENT (OUTPATIENT)
Dept: GENERAL RADIOLOGY | Facility: HOSPITAL | Age: 70
End: 2025-07-04
Payer: MEDICARE

## 2025-07-04 ENCOUNTER — HOSPITAL ENCOUNTER (EMERGENCY)
Facility: HOSPITAL | Age: 70
Discharge: HOME | End: 2025-07-04
Attending: EMERGENCY MEDICINE
Payer: MEDICARE

## 2025-07-04 VITALS
BODY MASS INDEX: 22.68 KG/M2 | WEIGHT: 141.09 LBS | OXYGEN SATURATION: 100 % | HEIGHT: 66 IN | TEMPERATURE: 98.4 F | RESPIRATION RATE: 16 BRPM | HEART RATE: 60 BPM | SYSTOLIC BLOOD PRESSURE: 148 MMHG | DIASTOLIC BLOOD PRESSURE: 80 MMHG

## 2025-07-04 DIAGNOSIS — I50.9 CONGESTIVE HEART FAILURE, UNSPECIFIED HF CHRONICITY, UNSPECIFIED HEART FAILURE TYPE: ICD-10-CM

## 2025-07-04 DIAGNOSIS — L60.8 TOENAIL DEFORMITY: ICD-10-CM

## 2025-07-04 DIAGNOSIS — R60.0 LOWER EXTREMITY EDEMA: Primary | ICD-10-CM

## 2025-07-04 LAB
ALBUMIN SERPL-MCNC: 2.5 G/DL (ref 3.5–5.2)
ALBUMIN/GLOB SERPL: 1.1 G/DL
ALP SERPL-CCNC: 121 U/L (ref 39–117)
ALT SERPL W P-5'-P-CCNC: 15 U/L (ref 1–41)
ANION GAP SERPL CALCULATED.3IONS-SCNC: 5.5 MMOL/L (ref 5–15)
AST SERPL-CCNC: 33 U/L (ref 1–40)
BASOPHILS # BLD AUTO: 0.04 10*3/MM3 (ref 0–0.2)
BASOPHILS NFR BLD AUTO: 0.9 % (ref 0–1.5)
BILIRUB SERPL-MCNC: 0.2 MG/DL (ref 0–1.2)
BUN SERPL-MCNC: 9.5 MG/DL (ref 8–23)
BUN/CREAT SERPL: 9.9 (ref 7–25)
CALCIUM SPEC-SCNC: 7.9 MG/DL (ref 8.6–10.5)
CHLORIDE SERPL-SCNC: 110 MMOL/L (ref 98–107)
CO2 SERPL-SCNC: 23.5 MMOL/L (ref 22–29)
CREAT SERPL-MCNC: 0.96 MG/DL (ref 0.76–1.27)
DEPRECATED RDW RBC AUTO: 66.9 FL (ref 37–54)
EGFRCR SERPLBLD CKD-EPI 2021: 84.5 ML/MIN/1.73
EOSINOPHIL # BLD AUTO: 0.04 10*3/MM3 (ref 0–0.4)
EOSINOPHIL NFR BLD AUTO: 0.9 % (ref 0.3–6.2)
ERYTHROCYTE [DISTWIDTH] IN BLOOD BY AUTOMATED COUNT: 19.7 % (ref 12.3–15.4)
GLOBULIN UR ELPH-MCNC: 2.3 GM/DL
GLUCOSE SERPL-MCNC: 117 MG/DL (ref 65–99)
HCT VFR BLD AUTO: 29.5 % (ref 37.5–51)
HGB BLD-MCNC: 9.1 G/DL (ref 13–17.7)
IMM GRANULOCYTES # BLD AUTO: 0.01 10*3/MM3 (ref 0–0.05)
IMM GRANULOCYTES NFR BLD AUTO: 0.2 % (ref 0–0.5)
LYMPHOCYTES # BLD AUTO: 1.33 10*3/MM3 (ref 0.7–3.1)
LYMPHOCYTES NFR BLD AUTO: 28.5 % (ref 19.6–45.3)
MCH RBC QN AUTO: 28.8 PG (ref 26.6–33)
MCHC RBC AUTO-ENTMCNC: 30.8 G/DL (ref 31.5–35.7)
MCV RBC AUTO: 93.4 FL (ref 79–97)
MONOCYTES # BLD AUTO: 0.64 10*3/MM3 (ref 0.1–0.9)
MONOCYTES NFR BLD AUTO: 13.7 % (ref 5–12)
NEUTROPHILS NFR BLD AUTO: 2.6 10*3/MM3 (ref 1.7–7)
NEUTROPHILS NFR BLD AUTO: 55.8 % (ref 42.7–76)
NRBC BLD AUTO-RTO: 0 /100 WBC (ref 0–0.2)
NT-PROBNP SERPL-MCNC: 1417 PG/ML (ref 0–900)
PLATELET # BLD AUTO: 154 10*3/MM3 (ref 140–450)
PMV BLD AUTO: 11.3 FL (ref 6–12)
POTASSIUM SERPL-SCNC: 5 MMOL/L (ref 3.5–5.2)
PROT SERPL-MCNC: 4.8 G/DL (ref 6–8.5)
RBC # BLD AUTO: 3.16 10*6/MM3 (ref 4.14–5.8)
SODIUM SERPL-SCNC: 139 MMOL/L (ref 136–145)
WBC NRBC COR # BLD AUTO: 4.66 10*3/MM3 (ref 3.4–10.8)

## 2025-07-04 PROCEDURE — 80053 COMPREHEN METABOLIC PANEL: CPT

## 2025-07-04 PROCEDURE — 85025 COMPLETE CBC W/AUTO DIFF WBC: CPT

## 2025-07-04 PROCEDURE — 71045 X-RAY EXAM CHEST 1 VIEW: CPT

## 2025-07-04 PROCEDURE — 99283 EMERGENCY DEPT VISIT LOW MDM: CPT

## 2025-07-04 PROCEDURE — 36415 COLL VENOUS BLD VENIPUNCTURE: CPT

## 2025-07-04 PROCEDURE — 83880 ASSAY OF NATRIURETIC PEPTIDE: CPT

## 2025-07-04 RX ORDER — FUROSEMIDE 20 MG/1
20 TABLET ORAL DAILY PRN
Qty: 14 TABLET | Refills: 0 | Status: SHIPPED | OUTPATIENT
Start: 2025-07-04 | End: 2025-07-18

## 2025-07-04 RX ORDER — FUROSEMIDE 20 MG/1
20 TABLET ORAL ONCE
Status: COMPLETED | OUTPATIENT
Start: 2025-07-04 | End: 2025-07-04

## 2025-07-04 RX ORDER — POTASSIUM CHLORIDE 1500 MG/1
20 TABLET, EXTENDED RELEASE ORAL DAILY PRN
Qty: 14 TABLET | Refills: 0 | Status: SHIPPED | OUTPATIENT
Start: 2025-07-04 | End: 2025-07-18

## 2025-07-04 RX ADMIN — FUROSEMIDE 20 MG: 20 TABLET ORAL at 12:26

## 2025-07-04 NOTE — ED PROVIDER NOTES
"SHARED VISIT ATTESTATION:    This visit was performed by myself and an APC.  I personally approved the management plan/medical decision making and take responsibility for the patient management.      SHARED VISIT NOTE:    Patient is 70 y.o. year old male that presents to the ED for evaluation of toe pain, bilateral lower extremity edema.     Physical Exam    ED Course:    /83   Pulse 60   Temp 98.7 °F (37.1 °C) (Oral)   Resp 16   Ht 167.6 cm (66\")   Wt 64 kg (141 lb 1.5 oz)   SpO2 100%   BMI 22.77 kg/m²       The following orders were placed and all results were independently analyzed by me:  Orders Placed This Encounter   Procedures    XR Chest 1 View    Comprehensive Metabolic Panel    BNP    CBC Auto Differential    CBC & Differential       Medications Given in the Emergency Department:  Medications - No data to display     ED Course:         Labs:    Lab Results (last 24 hours)       Procedure Component Value Units Date/Time    CBC & Differential [[id number]]  (Abnormal) Collected: 07/04/25 1126    Specimen: Blood Updated: 07/04/25 1130    Narrative:      The following orders were created for panel order CBC & Differential.  Procedure                               Abnormality         Status                     ---------                               -----------         ------                     CBC Auto Differential[[id number]]        Abnormal            Final result                 Please view results for these tests on the individual orders.    Comprehensive Metabolic Panel [[id number]]  (Abnormal) Collected: 07/04/25 1126    Specimen: Blood Updated: 07/04/25 1148     Glucose 117 mg/dL      BUN 9.5 mg/dL      Creatinine 0.96 mg/dL      Sodium 139 mmol/L      Potassium 5.0 mmol/L      Chloride 110 mmol/L      CO2 23.5 mmol/L      Calcium 7.9 mg/dL      Total Protein 4.8 g/dL      Albumin 2.5 g/dL      ALT (SGPT) 15 U/L      AST (SGOT) 33 U/L      Alkaline Phosphatase 121 U/L      Total Bilirubin 0.2 " mg/dL      Globulin 2.3 gm/dL      A/G Ratio 1.1 g/dL      BUN/Creatinine Ratio 9.9     Anion Gap 5.5 mmol/L      eGFR 84.5 mL/min/1.73     Narrative:      GFR Categories in Chronic Kidney Disease (CKD)              GFR Category          GFR (mL/min/1.73)    Interpretation  G1                    90 or greater        Normal or high (1)  G2                    60-89                Mild decrease (1)  G3a                   45-59                Mild to moderate decrease  G3b                   30-44                Moderate to severe decrease  G4                    15-29                Severe decrease  G5                    14 or less           Kidney failure    (1)In the absence of evidence of kidney disease, neither GFR category G1 or G2 fulfill the criteria for CKD.    eGFR calculation 2021 CKD-EPI creatinine equation, which does not include race as a factor    BNP [[id number]]  (Abnormal) Collected: 07/04/25 1126    Specimen: Blood Updated: 07/04/25 1146     proBNP 1,417.0 pg/mL     Narrative:      This assay is used as an aid in the diagnosis of individuals suspected of having heart failure. It can be used as an aid in the diagnosis of acute decompensated heart failure (ADHF) in patients presenting with signs and symptoms of ADHF to the emergency department (ED). In addition, NT-proBNP of <300 pg/mL indicates ADHF is not likely.    Age Range Result Interpretation  NT-proBNP Concentration (pg/mL:      <50             Positive            >450                   Gray                 300-450                    Negative             <300    50-75           Positive            >900                  Gray                300-900                  Negative            <300      >75             Positive            >1800                  Gray                300-1800                  Negative            <300    CBC Auto Differential [[id number]]  (Abnormal) Collected: 07/04/25 1126    Specimen: Blood Updated: 07/04/25 1130     WBC 4.66  10*3/mm3      RBC 3.16 10*6/mm3      Hemoglobin 9.1 g/dL      Hematocrit 29.5 %      MCV 93.4 fL      MCH 28.8 pg      MCHC 30.8 g/dL      RDW 19.7 %      RDW-SD 66.9 fl      MPV 11.3 fL      Platelets 154 10*3/mm3      Neutrophil % 55.8 %      Lymphocyte % 28.5 %      Monocyte % 13.7 %      Eosinophil % 0.9 %      Basophil % 0.9 %      Immature Grans % 0.2 %      Neutrophils, Absolute 2.60 10*3/mm3      Lymphocytes, Absolute 1.33 10*3/mm3      Monocytes, Absolute 0.64 10*3/mm3      Eosinophils, Absolute 0.04 10*3/mm3      Basophils, Absolute 0.04 10*3/mm3      Immature Grans, Absolute 0.01 10*3/mm3      nRBC 0.0 /100 WBC              Imaging:    No Radiology Exams Resulted Within Past 24 Hours    MDM:    Procedures    Labs were collected in the emergency department and all labs were reviewed and interpreted by me.  X-ray were performed in the emergency department and all X-ray impressions were independently interpreted by me.                     Chidi Genao MD  12:05 EDT  07/04/25         Chidi Genao MD  07/04/25 6975

## 2025-07-04 NOTE — ED PROVIDER NOTES
Time: 11:12 AM EDT  Date of encounter:  7/4/2025  Independent Historian/Clinical History and Information was obtained by:   Patient    History is limited by: N/A    Chief Complaint: Toe pain      History of Present Illness:  Patient is a 70 y.o. year old male who presents to the emergency department for evaluation of toe pain and bilateral lower extremity swelling.  Patient reports he has been seen by his primary care provider for toe pain and referred to podiatry and is awaiting to schedule appointment.  Patient reports he is currently on chemotherapy for pancreatic cancer.  Complains of lower extremity swelling ongoing for 1 week.  Denies any chest pain or shortness of breath.      Patient Care Team  Primary Care Provider: Forrest Duong MD    Past Medical History:     Allergies   Allergen Reactions    Aspirin Nausea Only     Past Medical History:   Diagnosis Date    Allergies     Asthma     GERD (gastroesophageal reflux disease)     Hypertension     Malignant neoplasm of pancreas 09/05/2024     Past Surgical History:   Procedure Laterality Date    HERNIA REPAIR       History reviewed. No pertinent family history.    Home Medications:  Prior to Admission medications    Medication Sig Start Date End Date Taking? Authorizing Provider   albuterol sulfate  (90 Base) MCG/ACT inhaler Inhale 2 puffs Every 4 (Four) Hours As Needed for Wheezing. 12/1/22   Yuri Patel,    amLODIPine (Norvasc) 5 MG tablet Take 1 tablet by mouth Daily.    ProviderWilliam MD   buprenorphine-naloxone (Suboxone) 8-2 MG film film Place 1 film under the tongue 2 (Two) Times a Day.    William Dias MD   busPIRone (BUSPAR) 15 MG tablet Take 1 tablet by mouth 3 (Three) Times a Day.    William Dias MD   cetirizine (ZyrTEC Allergy) 10 MG tablet Take 1 tablet by mouth Daily.  Patient not taking: Reported on 5/6/2025    William Dias MD   DULoxetine (CYMBALTA) 30 MG capsule TAKE 1 CAPSULE BY MOUTH DAILY  2/3/25   Gene Blanchard MD   gabapentin (NEURONTIN) 800 MG tablet Take 1 tablet by mouth 3 (Three) Times a Day.    William Dias MD   ibuprofen (ADVIL,MOTRIN) 800 MG tablet Take 1 tablet by mouth Every 8 (Eight) Hours As Needed for Mild Pain. 12/3/24   Gene Blanchard MD   lidocaine-prilocaine (EMLA) 2.5-2.5 % cream Apply 1 Application topically to the appropriate area as directed As Needed for Injection Site Pain. 9/5/24   Gene Blanchard MD   OLANZapine (zyPREXA) 5 MG tablet Take 1 tablet by mouth Every Night. 6/3/25   Gene Blanchard MD   ondansetron (ZOFRAN) 8 MG tablet Take 1 tablet by mouth 3 (Three) Times a Day As Needed for Nausea or Vomiting. 4/8/25   Gene Blanchard MD   pancrelipase, Lip-Prot-Amyl, (CREON) 35459-94117 units capsule delayed-release particles capsule Take 1 capsule by mouth 3 (Three) Times a Day With Meals. 4/22/25   Gene Blanchard MD   prazosin (MINIPRESS) 1 MG capsule Take 1 capsule by mouth Every Night.  Patient not taking: Reported on 5/6/2025    William Dias MD   propranolol LA (Inderal LA) 60 MG 24 hr capsule Take 1 capsule by mouth Daily.  Patient not taking: Reported on 5/6/2025    William Dias MD   traZODone (DESYREL) 100 MG tablet Daily.    William Dias MD        Social History:   Social History     Tobacco Use    Smoking status: Every Day     Current packs/day: 0.50     Types: Cigarettes    Smokeless tobacco: Never   Vaping Use    Vaping status: Every Day   Substance Use Topics    Alcohol use: Never    Drug use: Never         Review of Systems:  Review of Systems   Constitutional:  Negative for chills, fatigue and fever.   HENT:  Negative for ear pain, rhinorrhea and sore throat.    Eyes:  Negative for visual disturbance.   Respiratory:  Negative for cough and shortness of breath.    Cardiovascular:  Positive for leg swelling. Negative for chest pain.   Gastrointestinal:  Negative for abdominal pain, diarrhea and vomiting.  "  Genitourinary:  Negative for difficulty urinating.   Musculoskeletal:  Negative for arthralgias, back pain and myalgias.   Skin:  Negative for rash.   Neurological:  Negative for light-headedness and headaches.   Hematological:  Negative for adenopathy.   Psychiatric/Behavioral: Negative.          Physical Exam:  /80   Pulse 60   Temp 98.4 °F (36.9 °C) (Oral)   Resp 16   Ht 167.6 cm (66\")   Wt 64 kg (141 lb 1.5 oz)   SpO2 100%   BMI 22.77 kg/m²     Physical Exam  Vitals and nursing note reviewed.   Constitutional:       General: He is not in acute distress.     Appearance: Normal appearance. He is not toxic-appearing.   HENT:      Head: Normocephalic and atraumatic.      Nose: Nose normal.      Mouth/Throat:      Mouth: Mucous membranes are moist.   Eyes:      Conjunctiva/sclera: Conjunctivae normal.   Cardiovascular:      Rate and Rhythm: Normal rate.      Pulses: Normal pulses.      Heart sounds: Normal heart sounds.   Pulmonary:      Effort: Pulmonary effort is normal.      Breath sounds: Normal breath sounds.   Abdominal:      General: Bowel sounds are normal.      Palpations: Abdomen is soft.      Tenderness: There is no abdominal tenderness.   Musculoskeletal:         General: Normal range of motion.      Cervical back: Normal range of motion.      Right lower leg: Edema present.      Left lower leg: Edema present.   Skin:     General: Skin is warm and dry.      Findings: No erythema.      Comments: Dried drainage present under toenails of 1st and 2nd digit of bilateral feet.  Angulation of toenail of second left digit.  No surrounding erythema or swelling.   Neurological:      General: No focal deficit present.      Mental Status: He is alert and oriented to person, place, and time.   Psychiatric:         Mood and Affect: Mood normal.         Behavior: Behavior normal.         Thought Content: Thought content normal.         Judgment: Judgment normal.                    Medical Decision " Making:      Comorbidities that affect care:    Cancer    External Notes reviewed:    None      The following orders were placed and all results were independently analyzed by me:  Orders Placed This Encounter   Procedures    XR Chest 1 View    Comprehensive Metabolic Panel    BNP    CBC Auto Differential    CBC & Differential       Medications Given in the Emergency Department:  Medications   furosemide (LASIX) tablet 20 mg (20 mg Oral Given 7/4/25 1226)        ED Course:         Labs:    Lab Results (last 24 hours)       Procedure Component Value Units Date/Time    CBC & Differential [[id number]]  (Abnormal) Collected: 07/04/25 1126    Specimen: Blood Updated: 07/04/25 1130    Narrative:      The following orders were created for panel order CBC & Differential.  Procedure                               Abnormality         Status                     ---------                               -----------         ------                     CBC Auto Differential[[id number]]        Abnormal            Final result                 Please view results for these tests on the individual orders.    Comprehensive Metabolic Panel [[id number]]  (Abnormal) Collected: 07/04/25 1126    Specimen: Blood Updated: 07/04/25 1148     Glucose 117 mg/dL      BUN 9.5 mg/dL      Creatinine 0.96 mg/dL      Sodium 139 mmol/L      Potassium 5.0 mmol/L      Chloride 110 mmol/L      CO2 23.5 mmol/L      Calcium 7.9 mg/dL      Total Protein 4.8 g/dL      Albumin 2.5 g/dL      ALT (SGPT) 15 U/L      AST (SGOT) 33 U/L      Alkaline Phosphatase 121 U/L      Total Bilirubin 0.2 mg/dL      Globulin 2.3 gm/dL      A/G Ratio 1.1 g/dL      BUN/Creatinine Ratio 9.9     Anion Gap 5.5 mmol/L      eGFR 84.5 mL/min/1.73     Narrative:      GFR Categories in Chronic Kidney Disease (CKD)              GFR Category          GFR (mL/min/1.73)    Interpretation  G1                    90 or greater        Normal or high (1)  G2                    60-89                Mild  decrease (1)  G3a                   45-59                Mild to moderate decrease  G3b                   30-44                Moderate to severe decrease  G4                    15-29                Severe decrease  G5                    14 or less           Kidney failure    (1)In the absence of evidence of kidney disease, neither GFR category G1 or G2 fulfill the criteria for CKD.    eGFR calculation 2021 CKD-EPI creatinine equation, which does not include race as a factor    BNP [[id number]]  (Abnormal) Collected: 07/04/25 1126    Specimen: Blood Updated: 07/04/25 1146     proBNP 1,417.0 pg/mL     Narrative:      This assay is used as an aid in the diagnosis of individuals suspected of having heart failure. It can be used as an aid in the diagnosis of acute decompensated heart failure (ADHF) in patients presenting with signs and symptoms of ADHF to the emergency department (ED). In addition, NT-proBNP of <300 pg/mL indicates ADHF is not likely.    Age Range Result Interpretation  NT-proBNP Concentration (pg/mL:      <50             Positive            >450                   Gray                 300-450                    Negative             <300    50-75           Positive            >900                  Gray                300-900                  Negative            <300      >75             Positive            >1800                  Gray                300-1800                  Negative            <300    CBC Auto Differential [[id number]]  (Abnormal) Collected: 07/04/25 1126    Specimen: Blood Updated: 07/04/25 1130     WBC 4.66 10*3/mm3      RBC 3.16 10*6/mm3      Hemoglobin 9.1 g/dL      Hematocrit 29.5 %      MCV 93.4 fL      MCH 28.8 pg      MCHC 30.8 g/dL      RDW 19.7 %      RDW-SD 66.9 fl      MPV 11.3 fL      Platelets 154 10*3/mm3      Neutrophil % 55.8 %      Lymphocyte % 28.5 %      Monocyte % 13.7 %      Eosinophil % 0.9 %      Basophil % 0.9 %      Immature Grans % 0.2 %      Neutrophils,  Absolute 2.60 10*3/mm3      Lymphocytes, Absolute 1.33 10*3/mm3      Monocytes, Absolute 0.64 10*3/mm3      Eosinophils, Absolute 0.04 10*3/mm3      Basophils, Absolute 0.04 10*3/mm3      Immature Grans, Absolute 0.01 10*3/mm3      nRBC 0.0 /100 WBC              Imaging:    XR Chest 1 View  Result Date: 7/4/2025  XR CHEST 1 VW Date of Exam: 7/4/2025 11:40 AM EDT Indication: edema Comparison: CT chest 7/1/2025, AP chest x-ray 6/4/2025 Findings: Tip of the right chest port catheter terminates in the SVC. Lungs are adequately expanded and appear clear. No pneumothorax or large pleural effusion is seen. Cardiomediastinal contours appear within normal limits.     Impression: No acute cardiopulmonary abnormality is identified. Electronically Signed: Tiesha Dee MD  7/4/2025 12:16 PM EDT  Workstation ID: [id number]        Differential Diagnosis and Discussion:    Extremity Pain: Differential diagnosis includes but is not limited to soft tissue sprain, tendonitis, tendon injury, dislocation, fracture, deep vein thrombosis, arterial insufficiency, osteoarthritis, bursitis, and ligamentous damage.    PROCEDURES:    Labs were collected in the emergency department and all labs were reviewed and interpreted by me.    No orders to display       Procedures    MDM     Amount and/or Complexity of Data Reviewed  Clinical lab tests: reviewed  Decide to obtain previous medical records or to obtain history from someone other than the patient: yes                       Patient Care Considerations:    NARCOTICS: I considered prescribing opiate pain medication as an outpatient, however patient's pain is manageable without the use of narcotics in the ED.      Consultants/Shared Management Plan:    SHARED VISIT: I have discussed the case with my supervising physician, dr Genao who states he agrees with treatment plan. The substantive portion of the medical decision was made by the attesting physician who made or approve the management plan  and will take responsibility for the patient.  Clinical findings were discussed and ultimate disposition was made in consult with supervising physician.    Social Determinants of Health:    Patient is independent, reliable, and has access to care.       Disposition and Care Coordination:    Discharged: The patient is suitable and stable for discharge with no need for consideration of admission.    I have explained the patient´s condition, diagnoses and treatment plan based on the information available to me at this time. I have answered questions and addressed any concerns. The patient has a good  understanding of the patient´s diagnosis, condition, and treatment plan as can be expected at this point. The vital signs have been stable. The patient´s condition is stable and appropriate for discharge from the emergency department.      The patient will pursue further outpatient evaluation with the primary care physician or other designated or consulting physician as outlined in the discharge instructions. They are agreeable to this plan of care and follow-up instructions have been explained in detail. The patient has received these instructions in written format and has expressed an understanding of the discharge instructions. The patient is aware that any significant change in condition or worsening of symptoms should prompt an immediate return to this or the closest emergency department or call to 911.  I have explained discharge medications and the need for follow up with the patient/caretakers. This was also printed in the discharge instructions. Patient was discharged with the following medications and follow up:      Medication List        New Prescriptions      amoxicillin-clavulanate 875-125 MG per tablet  Commonly known as: AUGMENTIN  Take 1 tablet by mouth Every 12 (Twelve) Hours for 5 days.     furosemide 20 MG tablet  Commonly known as: LASIX  Take 1 tablet by mouth Daily As Needed (leg swelling) for up to  14 days.     potassium chloride 20 MEQ CR tablet  Commonly known as: KLOR-CON M20  Take 1 tablet by mouth Daily As Needed (take with lasix only) for up to 14 days. To take only on days you are taking Lasix.               Where to Get Your Medications        These medications were sent to [id number] - [place], [place] - [address]  AT [address] - [phone]  - [phone]   [address] [facility] [address]      Phone: [phone]   amoxicillin-clavulanate 875-125 MG per tablet  furosemide 20 MG tablet  potassium chloride 20 MEQ CR tablet      Forrest Duong MD  [address] [place] [id number] [phone]    Go to   As needed       Final diagnoses:   Lower extremity edema   Congestive heart failure, unspecified HF chronicity, unspecified heart failure type   Toenail deformity        ED Disposition       ED Disposition   Discharge    Condition   Stable    Comment   --               This medical record created using voice recognition software.             Carissa Mayers, APRN  07/04/25 7073

## 2025-07-04 NOTE — DISCHARGE INSTRUCTIONS
Please follow-up with your primary care provider this week for reevaluation.  Return to the ED for any new or worsening concerning symptoms especially if you develop chest pain shortness of breath.  You are prescribed antibiotics to take to treat infection of your toenails.  Please follow-up with podiatry as previously scheduled.

## 2025-07-07 ENCOUNTER — TELEPHONE (OUTPATIENT)
Dept: ONCOLOGY | Facility: HOSPITAL | Age: 70
End: 2025-07-07
Payer: MEDICARE

## 2025-07-07 ENCOUNTER — OFFICE VISIT (OUTPATIENT)
Dept: ONCOLOGY | Facility: HOSPITAL | Age: 70
End: 2025-07-07
Payer: MEDICARE

## 2025-07-07 VITALS
DIASTOLIC BLOOD PRESSURE: 95 MMHG | TEMPERATURE: 98.1 F | RESPIRATION RATE: 18 BRPM | BODY MASS INDEX: 23.21 KG/M2 | WEIGHT: 144.4 LBS | SYSTOLIC BLOOD PRESSURE: 133 MMHG | HEART RATE: 69 BPM | HEIGHT: 66 IN | OXYGEN SATURATION: 96 %

## 2025-07-07 DIAGNOSIS — C25.0 MALIGNANT NEOPLASM OF HEAD OF PANCREAS: Primary | ICD-10-CM

## 2025-07-07 PROCEDURE — 1159F MED LIST DOCD IN RCRD: CPT | Performed by: INTERNAL MEDICINE

## 2025-07-07 PROCEDURE — 99214 OFFICE O/P EST MOD 30 MIN: CPT | Performed by: INTERNAL MEDICINE

## 2025-07-07 PROCEDURE — 1125F AMNT PAIN NOTED PAIN PRSNT: CPT | Performed by: INTERNAL MEDICINE

## 2025-07-07 PROCEDURE — 1160F RVW MEDS BY RX/DR IN RCRD: CPT | Performed by: INTERNAL MEDICINE

## 2025-07-07 PROCEDURE — G2211 COMPLEX E/M VISIT ADD ON: HCPCS | Performed by: INTERNAL MEDICINE

## 2025-07-07 PROCEDURE — G0463 HOSPITAL OUTPT CLINIC VISIT: HCPCS | Performed by: INTERNAL MEDICINE

## 2025-07-07 NOTE — ASSESSMENT & PLAN NOTE
Status post neoadjuvant FOLFIRINOX followed by Whipple procedure and for subsequent doses of FOLFIRINOX to complete 12 months of perioperative therapy.  His albumin and total protein are decreased and we discussed the need to maximize nutrition, especially protein.  This is the likely cause of his edema on the legs.  We discussed keeping the feet elevated when not active and using light compression stockings when he is.  His tumor marker has increased but restaging scans do not show any clear evidence of new or progressive disease.  He will have short interval follow-up and I will see him back in 3 months for that purpose with lab work including tumor marker and CT scans prior.  Port flush routinely while not in active use

## 2025-07-07 NOTE — TELEPHONE ENCOUNTER
patient wanted to wait to get follow up and port flush scheduled till he can look at his work schedule. Said he will call back

## 2025-07-07 NOTE — PROGRESS NOTES
Chief Complaint  Malignant neoplasm of head of pancreas    Forrest Duong MD Smith, Forrest Leon MD    Subjective          Josue Stern presents to [facility] for follow-up of his pancreatic cancer.  He has completed adjuvant treatment.  He notes that he still has some neuropathy and swelling especially in his feet when he is on them for any period of time.  He does feel like his appetite is improving and he is trying to focus more on protein in his diet.  He denies masses or adenopathy.  No unusual aches or pains.  No issues with Port-A-Cath.    Oncology/Hematology History   Malignant neoplasm of head of pancreas   9/5/2024 Initial Diagnosis    Malignant neoplasm of pancreas     9/5/2024 -  Chemotherapy    OP CENTRAL VENOUS ACCESS DEVICE Access, Care, and Maintenance (CVAD)     9/17/2024 -  Chemotherapy    OP PANCREATIC mFOLFIRINOX (OXALIplatin / Leucovorin / Irinotecan / Fluorouracil CIV)     4/8/2025 Cancer Staged    Staging form: Exocrine Pancreas, AJCC 8th Edition  - Pathologic: Stage IIB (ypT2, ypN1, cM0) - Signed by Gene Blanchard MD on 4/8/2025           Current Outpatient Medications on File Prior to Visit   Medication Sig Dispense Refill    albuterol sulfate  (90 Base) MCG/ACT inhaler Inhale 2 puffs Every 4 (Four) Hours As Needed for Wheezing. 18 g 0    amLODIPine (Norvasc) 5 MG tablet Take 1 tablet by mouth Daily.      amoxicillin-clavulanate (AUGMENTIN) 875-125 MG per tablet Take 1 tablet by mouth Every 12 (Twelve) Hours for 5 days. 10 tablet 0    busPIRone (BUSPAR) 15 MG tablet Take 1 tablet by mouth 3 (Three) Times a Day.      DULoxetine (CYMBALTA) 30 MG capsule TAKE 1 CAPSULE BY MOUTH DAILY 30 capsule 1    furosemide (LASIX) 20 MG tablet Take 1 tablet by mouth Daily As Needed (leg swelling) for up to 14 days. 14 tablet 0    gabapentin (NEURONTIN) 800 MG tablet Take 1 tablet by mouth 3 (Three) Times a Day.      lidocaine-prilocaine  (EMLA) 2.5-2.5 % cream Apply 1 Application topically to the appropriate area as directed As Needed for Injection Site Pain. 30 g 1    OLANZapine (zyPREXA) 5 MG tablet Take 1 tablet by mouth Every Night. 30 tablet 2    pancrelipase, Lip-Prot-Amyl, (CREON) 08181-13631 units capsule delayed-release particles capsule Take 1 capsule by mouth 3 (Three) Times a Day With Meals. 90 capsule 1    potassium chloride (KLOR-CON M20) 20 MEQ CR tablet Take 1 tablet by mouth Daily As Needed (take with lasix only) for up to 14 days. To take only on days you are taking Lasix. 14 tablet 0    traZODone (DESYREL) 100 MG tablet Daily.      buprenorphine-naloxone (Suboxone) 8-2 MG film film Place 1 film under the tongue 2 (Two) Times a Day.      cetirizine (ZyrTEC Allergy) 10 MG tablet Take 1 tablet by mouth Daily. (Patient not taking: Reported on 4/8/2025)      ondansetron (ZOFRAN) 8 MG tablet Take 1 tablet by mouth 3 (Three) Times a Day As Needed for Nausea or Vomiting. (Patient not taking: Reported on 7/7/2025) 30 tablet 5    prazosin (MINIPRESS) 1 MG capsule Take 1 capsule by mouth Every Night. (Patient not taking: Reported on 4/8/2025)      propranolol LA (Inderal LA) 60 MG 24 hr capsule Take 1 capsule by mouth Daily. (Patient not taking: Reported on 4/8/2025)       No current facility-administered medications on file prior to visit.       Allergies   Allergen Reactions    Aspirin Nausea Only     Past Medical History:   Diagnosis Date    Allergies     Asthma     GERD (gastroesophageal reflux disease)     Hypertension     Malignant neoplasm of pancreas 09/05/2024     Past Surgical History:   Procedure Laterality Date    HERNIA REPAIR       Social History     Socioeconomic History    Marital status: Single   Tobacco Use    Smoking status: Every Day     Current packs/day: 0.50     Types: Cigarettes    Smokeless tobacco: Never   Vaping Use    Vaping status: Every Day   Substance and Sexual Activity    Alcohol use: Never    Drug use: Never  "   Sexual activity: Defer     History reviewed. No pertinent family history.    Objective   Physical Exam  Vitals reviewed. Exam conducted with a chaperone present.   Cardiovascular:      Rate and Rhythm: Normal rate and regular rhythm.      Heart sounds: Normal heart sounds. No murmur heard.     No gallop.   Pulmonary:      Effort: Pulmonary effort is normal.      Breath sounds: Normal breath sounds.      Comments: Port-A-Cath  Abdominal:      General: Bowel sounds are normal.   Musculoskeletal:      Right lower leg: Edema present.      Left lower leg: Edema present.   Lymphadenopathy:      Cervical: No cervical adenopathy.   Psychiatric:         Mood and Affect: Mood normal.         Behavior: Behavior normal.         Vitals:    07/07/25 1340   BP: 133/95   Pulse: 69   Resp: 18   Temp: 98.1 °F (36.7 °C)   TempSrc: Oral   SpO2: 96%   Weight: 65.5 kg (144 lb 6.4 oz)   Height: 167.6 cm (65.98\")   PainSc: 6    PainLoc: Leg     ECOG score: 0         PHQ-9 Total Score:                    Result Review :   The following data was reviewed by: Gene Blanchard MD on 07/07/2025:  Lab Results   Component Value Date    HGB 9.1 (L) 07/04/2025    HCT 29.5 (L) 07/04/2025    MCV 93.4 07/04/2025     07/04/2025    WBC 4.66 07/04/2025    NEUTROABS 2.60 07/04/2025    LYMPHSABS 1.33 07/04/2025    MONOSABS 0.64 07/04/2025    EOSABS 0.04 07/04/2025    BASOSABS 0.04 07/04/2025     Lab Results   Component Value Date    GLUCOSE 117 (H) 07/04/2025    BUN 9.5 07/04/2025    CREATININE 0.96 07/04/2025     07/04/2025    K 5.0 07/04/2025     (H) 07/04/2025    CO2 23.5 07/04/2025    CALCIUM 7.9 (L) 07/04/2025    PROTEINTOT 4.8 (L) 07/04/2025    ALBUMIN 2.5 (L) 07/04/2025    BILITOT 0.2 07/04/2025    ALKPHOS 121 (H) 07/04/2025    AST 33 07/04/2025    ALT 15 07/04/2025     Lab Results   Component Value Date    MG 1.7 12/10/2021    PHOS 3.4 12/10/2021    FREET4 1.74 06/30/2020    TSH 0.256 (L) 12/06/2021     Lab Results "   Component Value Date    IRON 15 (L) 12/08/2021    LABIRON 7 (L) 12/08/2021    TRANSFERRIN 150 (L) 12/08/2021    TIBC 224 (L) 12/08/2021     Lab Results   Component Value Date    FERRITIN 75 12/21/2020    [id number] 1,022 (H) 12/08/2021    FOLATE 10.90 12/08/2021     Lab Results   Component Value Date     440.0 (H) 07/01/2025       Data reviewed : Radiologic studies CT chest, abdomen, pelvis reviewed      Assessment and Plan    Diagnoses and all orders for this visit:    1. Malignant neoplasm of head of pancreas (Primary)  Assessment & Plan:  Status post neoadjuvant FOLFIRINOX followed by Whipple procedure and for subsequent doses of FOLFIRINOX to complete 12 months of perioperative therapy.  His albumin and total protein are decreased and we discussed the need to maximize nutrition, especially protein.  This is the likely cause of his edema on the legs.  We discussed keeping the feet elevated when not active and using light compression stockings when he is.  His tumor marker has increased but restaging scans do not show any clear evidence of new or progressive disease.  He will have short interval follow-up and I will see him back in 3 months for that purpose with lab work including tumor marker and CT scans prior.  Port flush routinely while not in active use    Orders:  -     CT chest w contrast; Future  -     CT abdomen pelvis w contrast; Future  -     CBC & Differential; Future  -     Comprehensive Metabolic Panel; Future  -     Cancer Antigen 19-9; Future            Patient Follow Up: 3 months    Patient was given instructions and counseling regarding his condition or for health maintenance advice. Please see specific information pulled into the AVS if appropriate.     Gene Blanchard MD    7/7/2025

## 2025-07-28 ENCOUNTER — HOSPITAL ENCOUNTER (OUTPATIENT)
Dept: ONCOLOGY | Facility: HOSPITAL | Age: 70
Discharge: HOME | End: 2025-07-28
Admitting: INTERNAL MEDICINE
Payer: MEDICARE

## 2025-07-28 DIAGNOSIS — C25.0 MALIGNANT NEOPLASM OF HEAD OF PANCREAS: Primary | ICD-10-CM

## 2025-07-28 PROCEDURE — 96523 IRRIG DRUG DELIVERY DEVICE: CPT

## 2025-07-28 PROCEDURE — 25010000002 HEPARIN LOCK FLUSH PER 10 UNITS: Performed by: INTERNAL MEDICINE

## 2025-07-28 RX ORDER — SODIUM CHLORIDE 0.9 % (FLUSH) 0.9 %
20 SYRINGE (ML) INJECTION AS NEEDED
OUTPATIENT
Start: 2025-07-28

## 2025-07-28 RX ORDER — SODIUM CHLORIDE 0.9 % (FLUSH) 0.9 %
20 SYRINGE (ML) INJECTION AS NEEDED
Status: DISCONTINUED | OUTPATIENT
Start: 2025-07-28 | End: 2025-07-29 | Stop reason: HOSPADM

## 2025-07-28 RX ORDER — HEPARIN SODIUM (PORCINE) LOCK FLUSH IV SOLN 100 UNIT/ML 100 UNIT/ML
500 SOLUTION INTRAVENOUS AS NEEDED
OUTPATIENT
Start: 2025-07-28

## 2025-07-28 RX ORDER — HEPARIN SODIUM (PORCINE) LOCK FLUSH IV SOLN 100 UNIT/ML 100 UNIT/ML
500 SOLUTION INTRAVENOUS AS NEEDED
Status: DISCONTINUED | OUTPATIENT
Start: 2025-07-28 | End: 2025-07-29 | Stop reason: HOSPADM

## 2025-07-28 RX ADMIN — Medication 20 ML: at 12:47

## 2025-07-28 RX ADMIN — HEPARIN 500 UNITS: 100 SYRINGE at 12:47
